# Patient Record
Sex: FEMALE | Race: WHITE | NOT HISPANIC OR LATINO | Employment: FULL TIME | URBAN - METROPOLITAN AREA
[De-identification: names, ages, dates, MRNs, and addresses within clinical notes are randomized per-mention and may not be internally consistent; named-entity substitution may affect disease eponyms.]

---

## 2017-01-08 DIAGNOSIS — F41.1 GAD (GENERALIZED ANXIETY DISORDER): ICD-10-CM

## 2017-01-08 DIAGNOSIS — F33.1 MODERATE RECURRENT MAJOR DEPRESSION (H): Primary | ICD-10-CM

## 2017-01-10 NOTE — TELEPHONE ENCOUNTER
desvenlafaxine succinate ER (PRISTIQ) 50 MG 24 hr tablet    Last Written Prescription Date: 12/9/16  Last Fill Quantity: 30, # refills: 0  Last Office Visit with [facility], [facility] or [facility] prescribing provider: 10/13/16        BP Readings from Last 3 Encounters:   10/14/16 114/70   07/11/16 110/70   05/18/16 124/82     Pulse: (for Fetzima)  CREATININE   Date Value Ref Range Status   01/11/2016 0.75 0.52 - 1.04 mg/dL Final   ]    Last PHQ-9 score on record=   PHQ-9 SCORE 10/14/2016   Total Score -   Total Score 19

## 2017-01-12 RX ORDER — DESVENLAFAXINE SUCCINATE 50 MG
TABLET, EXTENDED RELEASE 24 HR ORAL
Qty: 90 TABLET | Refills: 1 | Status: SHIPPED | OUTPATIENT
Start: 2017-01-12 | End: 2017-07-18

## 2017-01-12 NOTE — TELEPHONE ENCOUNTER
PHQ-9 score:    PHQ-9 SCORE 1/12/2017   Total Score -   Total Score 2       Med previously listed as reported.  Dr. Vela filled while Dr. Rosales was out of the office.  Dr. Vela has never seen patient.   Please advise if ok to refill under DR. Rosales's name  Paradise Arceo RN

## 2017-01-12 NOTE — TELEPHONE ENCOUNTER
Called patient and completed PHQ-9 over the phone.   Routing to RN Refills Pools.    Maty Steele MA

## 2017-01-13 ASSESSMENT — PATIENT HEALTH QUESTIONNAIRE - PHQ9: SUM OF ALL RESPONSES TO PHQ QUESTIONS 1-9: 2

## 2017-01-13 NOTE — TELEPHONE ENCOUNTER
Left message advising patient rx was sent to her pharmacy. Gave x58 for questions.  Britney Disla, CMA

## 2017-01-13 NOTE — TELEPHONE ENCOUNTER
MA- please all patient and let her know her refills have been sent to the pharmacy.     Emmy Cheek RN   [facility]- Float Nurse/[place]

## 2017-04-13 ENCOUNTER — TELEPHONE (OUTPATIENT)
Dept: FAMILY MEDICINE | Facility: CLINIC | Age: 47
End: 2017-04-13

## 2017-04-13 NOTE — LETTER
[address] [address]                  Crystal Marcus,  [address] [place] [address]              Monitoring and managing your preventative and chronic health conditions are very important to us. Our records indicate that you are due for your Patient Health Questionnaire. Enclosed is a pre-stamped envelope to send your questionnaire back to us.      If you have received your health care elsewhere, please call the clinic so the information can be documented in your chart.    Please call [phone] or message us through your [employer] account to schedule an appointment or provide information for your chart.     Feel free to contact us if you have any questions or concerns!    I look forward to seeing you and working with you on your health care needs.     Sincerely,         Maryse Rosales MD/blt            *If you have already scheduled an appointment, please disregard this reminder

## 2017-07-11 ENCOUNTER — TELEPHONE (OUTPATIENT)
Dept: FAMILY MEDICINE | Facility: CLINIC | Age: 47
End: 2017-07-11

## 2017-07-11 ENCOUNTER — OFFICE VISIT (OUTPATIENT)
Dept: FAMILY MEDICINE | Facility: CLINIC | Age: 47
End: 2017-07-11
Payer: COMMERCIAL

## 2017-07-11 VITALS
DIASTOLIC BLOOD PRESSURE: 76 MMHG | WEIGHT: 168 LBS | HEIGHT: 67 IN | HEART RATE: 88 BPM | SYSTOLIC BLOOD PRESSURE: 121 MMHG | OXYGEN SATURATION: 94 % | TEMPERATURE: 99.1 F | BODY MASS INDEX: 26.37 KG/M2

## 2017-07-11 DIAGNOSIS — Z12.31 VISIT FOR SCREENING MAMMOGRAM: Primary | ICD-10-CM

## 2017-07-11 DIAGNOSIS — J20.9 ACUTE BRONCHITIS, UNSPECIFIED ORGANISM: ICD-10-CM

## 2017-07-11 PROCEDURE — 99213 OFFICE O/P EST LOW 20 MIN: CPT | Performed by: PHYSICIAN ASSISTANT

## 2017-07-11 RX ORDER — AZITHROMYCIN 250 MG/1
TABLET, FILM COATED ORAL
Qty: 6 TABLET | Refills: 0 | Status: SHIPPED | OUTPATIENT
Start: 2017-07-11 | End: 2017-11-06

## 2017-07-11 RX ORDER — CODEINE PHOSPHATE AND GUAIFENESIN 10; 100 MG/5ML; MG/5ML
1 SOLUTION ORAL
Qty: 120 ML | Refills: 0 | Status: SHIPPED | OUTPATIENT
Start: 2017-07-11 | End: 2017-08-31

## 2017-07-11 ASSESSMENT — ANXIETY QUESTIONNAIRES
1. FEELING NERVOUS, ANXIOUS, OR ON EDGE: NOT AT ALL
GAD7 TOTAL SCORE: 0
3. WORRYING TOO MUCH ABOUT DIFFERENT THINGS: NOT AT ALL
7. FEELING AFRAID AS IF SOMETHING AWFUL MIGHT HAPPEN: NOT AT ALL
IF YOU CHECKED OFF ANY PROBLEMS ON THIS QUESTIONNAIRE, HOW DIFFICULT HAVE THESE PROBLEMS MADE IT FOR YOU TO DO YOUR WORK, TAKE CARE OF THINGS AT HOME, OR GET ALONG WITH OTHER PEOPLE: NOT DIFFICULT AT ALL
2. NOT BEING ABLE TO STOP OR CONTROL WORRYING: NOT AT ALL
5. BEING SO RESTLESS THAT IT IS HARD TO SIT STILL: NOT AT ALL
6. BECOMING EASILY ANNOYED OR IRRITABLE: NOT AT ALL

## 2017-07-11 ASSESSMENT — PATIENT HEALTH QUESTIONNAIRE - PHQ9: 5. POOR APPETITE OR OVEREATING: NOT AT ALL

## 2017-07-11 NOTE — PROGRESS NOTES
SUBJECTIVE:                                                    Crystal Marcus is a 46 year old female who presents to clinic today for the following health issues:    ENT Symptoms             Symptoms: cc Present Absent Comment   Fever/Chills   x    Fatigue  x     Muscle Aches   x    Eye Irritation   x    Sneezing   x    Nasal Delbert/Drg   x    Sinus Pressure/Pain   x    Loss of smell   x    Dental pain   x    Sore Throat   x    Swollen Glands   x    Ear Pain/Fullness   x    Cough  x     Wheeze  x     Chest Pain   x    Shortness of breath   x    Rash   x    Other   x      Symptom duration:  x 2-3 weeks   Symptom severity:  moderate   Treatments tried:  OTC   Contacts:  none       Started off as fatigue and cough.  Now even more tired.   Feels it in her chest.  No extra body aches.  NO shortness of breath.     Recent antibiotic use:never   Eating and drinking fluids normally:   yes  Vomiting or diarrhea: no  History of needing to use an inhaler:  never  History of pneumonia or bronchitis: no pneumonia, gets bronchitis regularly  Smoker: former, quit smoking 16 years ago  Allergies:no  Rashes: no  Oxygen is: 94 percent    No sinus congestion just in chest.   Does keep her up at night.   Using mucinex otc.   Needs note for work.       Per pt is due for a MAMMO, will call back to get it set up.          Problem list and histories reviewed & adjusted, as indicated.  Additional history: as documented    Patient Active Problem List   Diagnosis     FH: breast cancer in first degree relative     CARDIOVASCULAR SCREENING; LDL GOAL LESS THAN 160     Moderate recurrent major depression (H)     Hot flashes     Vitamin D deficiency     Constipation     Ureterocele, acquired     Past Surgical History:   Procedure Laterality Date     BIOPSY  1995, 2015     DAVINCI HYSTERECTOMY TOTAL, SALPINGECTOMY BILATERAL Bilateral 12/11/2015    Procedure: DAVINCI HYSTERECTOMY TOTAL, SALPINGECTOMY BILATERAL;  Surgeon: Dary Bloom MD;   "Location: [facility] OR     HYSTERECTOMY, PAP NO LONGER INDICATED       SURGICAL HISTORY OF -   1990    Cryotherapy     wisdom teeth removal         Social History   Substance Use Topics     Smoking status: Former Smoker     Packs/day: 0.20     Years: 12.00     Types: Cigarettes     Start date: 1/1/1988     Quit date: 9/1/2000     Smokeless tobacco: Never Used     Alcohol use Yes      Comment: 5 drinks a week     Family History   Problem Relation Age of Onset     Breast Cancer Mother      Lipids Mother      Hypertension Mother      Breast Cancer Maternal Grandmother      Hypertension Father      Seizure Disorder Brother          Current Outpatient Prescriptions   Medication Sig Dispense Refill     PRISTIQ 50 MG 24 hr tablet TAKE 1 TABLET (50 MG) BY MOUTH DAILY 90 tablet 1     multivitamin, therapeutic with minerals (THERA-VIT-M) TABS Take 1 tablet by mouth daily       ibuprofen (ADVIL,MOTRIN) 600 MG tablet Take 1 tablet (600 mg) by mouth every 6 hours as needed for pain (mild) 60 tablet 0     Ferrous Sulfate (IRON SUPPLEMENT PO) Take 325 mg by mouth daily        simethicone (MYLICON) 80 MG chewable tablet Take 1 tablet by mouth every 6 hours as needed for flatulence. 30 tablet 0     Allergies   Allergen Reactions     Sulfa Drugs Rash     SULFA       ROS:  Constitutional, HEENT, cardiovascular, pulmonary, gi and gu systems are negative, except as otherwise noted.    OBJECTIVE:     /76  Pulse 88  Temp 99.1  F (37.3  C) (Tympanic)  Ht 5' 7\" (1.702 m)  Wt 168 lb (76.2 kg)  LMP 12/07/2015  SpO2 94%  Breastfeeding? No  BMI 26.31 kg/m2  Body mass index is 26.31 kg/(m^2).  GENERAL:  No acute distress.  Interacts appropriately.  Breathing without difficulty.  Alert.  HEENT:  Tympanic membranes intact without effusion or erythema.  Oral mucosa moist. Posterior pharynx has no erythema.  Posterior pharynx has no exudate. Without edema.  NECK:  Soft and supple.  without tenderness.  Without lymphadenopathy.  Normal range " of motion.    CARDIAC:   Regular rate and rhythm.  No murmurs, rubs, or gallops.   PULMONARY: mild expiratory rhonchi throughout, no wheezing or crackles.   Normal air exchange/breath sounds.  No use of accessory muscles.    PSYCH: Normal affect.  SKIN: No rashes.        ASSESSMENT/PLAN:     ASSESSMENT / PLAN:  (Z12.31) Visit for screening mammogram  (primary encounter diagnosis)  Comment:   Plan: *MA Screening Digital Bilateral            (J20.9) Acute bronchitis, unspecified organism  Comment: viral versus bacterial discussed, not better after 2 weeks patient would like to try antibiotic and cough syrup at night to help her get rest  Needs note for work today also    Plan: azithromycin (ZITHROMAX) 250 MG tablet,         guaiFENesin-codeine (ROBITUSSIN AC) 100-10         MG/5ML SOLN solution          Do not drive with or mix cough syrup with alcohol. This can make you more tired/sedated.   Consider CXR if not improving or worsening symptoms occur    Take with food. Side effects discussed.  Call with worsening symptoms or if no improvement in 5 days.  Analgesics for pain with food as needed.        Patient Instructions   Please schedule mammogram within the next month as you are due        Crystal Abarca PA-C  [facility]

## 2017-07-11 NOTE — NURSING NOTE
"Chief Complaint   Patient presents with     Cough     coughing per pt x 2-3 weeks and not improving        Initial /76  Pulse 88  Temp 99.1  F (37.3  C) (Tympanic)  Ht 5' 7\" (1.702 m)  Wt 168 lb (76.2 kg)  LMP 12/07/2015  SpO2 94%  Breastfeeding? No  BMI 26.31 kg/m2 Estimated body mass index is 26.31 kg/(m^2) as calculated from the following:    Height as of this encounter: 5' 7\" (1.702 m).    Weight as of this encounter: 168 lb (76.2 kg).  Medication Reconciliation: complete      Ivette Steele MA    "

## 2017-07-11 NOTE — TELEPHONE ENCOUNTER
Patient has questions for Crystal on whether or not she should go back to work right away or should she be off for a certain amount of time while on medication, is she contagious? 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 Please call patient back as soon as possible to let her know. Thank you

## 2017-07-11 NOTE — TELEPHONE ENCOUNTER
Pt returned call and left vm to leave message on her vm.  Provider Message left as written.  Brit Downey RN

## 2017-07-11 NOTE — TELEPHONE ENCOUNTER
What does she mean by vulnerable? If just regular kids (not immunocompromised) she should be fine. Just cover cough and wash hands, usually after 2 weeks a patient is a lot less contagious than in the beginning.   She is fine to work as long as she does not have fever.     Crystal Abarca PA-C

## 2017-07-11 NOTE — LETTER
[facility]  [address] [facility] [address]  Phone: [phone]    July 11, 2017        Crystal RAMIREZ Angeline  [address] [facility] [address]          To whom it may concern:    RE: Crystal Bajwaevelin    Patient was seen and treated today at our clinic and missed work.    Please contact me for questions or concerns.      Sincerely,        Crystal Abarca PA-C

## 2017-07-11 NOTE — MR AVS SNAPSHOT
"              After Visit Summary   7/11/2017    Crystal Marcus    MRN: [id number]           Patient Information     Date Of Birth          1970        Visit Information        Provider Department      7/11/2017 9:20 AM Crystal Abarca PA-C [facility]        Today's Diagnoses     Visit for screening mammogram    -  1    Acute bronchitis, unspecified organism          Care Instructions    Please schedule mammogram within the next month as you are due          Follow-ups after your visit        Future tests that were ordered for you today     Open Future Orders        Priority Expected Expires Ordered    *MA Screening Digital Bilateral Routine  7/11/2018 7/11/2017            Who to contact     If you have questions or need follow up information about today's clinic visit or your schedule please contact [facility] directly at [phone].  Normal or non-critical lab and imaging results will be communicated to you by MyChart, letter or phone within 4 business days after the clinic has received the results. If you do not hear from us within 7 days, please contact the clinic through MyChart or phone. If you have a critical or abnormal lab result, we will notify you by phone as soon as possible.  Submit refill requests through [employer] or call your pharmacy and they will forward the refill request to us. Please allow 3 business days for your refill to be completed.          Additional Information About Your Visit        MyChart Information     [employer] lets you send messages to your doctor, view your test results, renew your prescriptions, schedule appointments and more. To sign up, go to [email or web address] . Click on \"Log in\" on the left side of the screen, which will take you to the Welcome page. Then click on \"Sign up Now\" on the right side of the page.     You will be asked to enter the access code listed below, as well as some personal information. Please follow the " "directions to create your username and password.     Your access code is: [email or web address]  Expires: 10/9/2017  9:52 AM     Your access code will  in 90 days. If you need help or a new code, please call your [facility] clinic or [phone].        Care EveryWhere ID     This is your Care EveryWhere ID. This could be used by other organizations to access your [facility] medical records  [id number]        Your Vitals Were     Pulse Temperature Height Last Period Pulse Oximetry Breastfeeding?    88 99.1  F (37.3  C) (Tympanic) 5' 7\" (1.702 m) 2015 94% No    BMI (Body Mass Index)                   26.31 kg/m2            Blood Pressure from Last 3 Encounters:   17 121/76   10/14/16 114/70   16 110/70    Weight from Last 3 Encounters:   17 168 lb (76.2 kg)   10/14/16 163 lb 8 oz (74.2 kg)   16 169 lb 6.4 oz (76.8 kg)              We Performed the Following     DEPRESSION ACTION PLAN (DAP)          Today's Medication Changes          These changes are accurate as of: 17  9:52 AM.  If you have any questions, ask your nurse or doctor.               Start taking these medicines.        Dose/Directions    azithromycin 250 MG tablet   Commonly known as:  ZITHROMAX   Used for:  Acute bronchitis, unspecified organism   Started by:  Crystal Abarca PA-C        Two tablets first day, then one tablet daily for four days.   Quantity:  6 tablet   Refills:  0       guaiFENesin-codeine 100-10 MG/5ML Soln solution   Commonly known as:  ROBITUSSIN AC   Used for:  Acute bronchitis, unspecified organism   Started by:  Crystal Abarca PA-C        Dose:  1 tsp.   Take 5 mLs by mouth nightly as needed for cough   Quantity:  120 mL   Refills:  0            Where to get your medicines      These medications were sent to [id number] - [place] - [address]  [facility] [address], [place] [address]     Phone:  [phone]     azithromycin 250 " MG tablet         Some of these will need a paper prescription and others can be bought over the counter.  Ask your nurse if you have questions.     Bring a paper prescription for each of these medications     guaiFENesin-codeine 100-10 MG/5ML Soln solution                Primary Care Provider Office Phone # Fax #    Maryse Rosales [phone]       [address] [address]        Equal Access to Services     MADELINE KENT : Hadii aad ku hadasho Soomaali, waaxda luqadaha, qaybta kaalmada adeegyada, waxay idiin hayaan adeeg kharash la'aan ah. [facility] [phone].    ATENCIÓN: Si habla español, tiene a haiens disposición servicios gratuitos de asistencia lingüística. [facility] [phone].    We comply with applicable federal civil rights laws and [place] laws. We do not discriminate on the basis of race, color, national origin, age, disability sex, sexual orientation or gender identity.            Thank you!     Thank you for choosing [facility]  [facility] your [facility]. Our goal is always to provide you with excellent care. Hearing back from our patients is one way we can continue to improve our services. Please take a few minutes to complete the written survey that you may receive in the mail after your visit with us. Thank you!             Your Updated Medication List - Protect others around you: Learn how to safely use, store and throw away your medicines at www.disposemymeds.org.          This list is accurate as of: 7/11/17  9:52 AM.  Always use your most recent med list.                   Brand Name Dispense Instructions for use Diagnosis    azithromycin 250 MG tablet    ZITHROMAX    6 tablet    Two tablets first day, then one tablet daily for four days.    Acute bronchitis, unspecified organism       guaiFENesin-codeine 100-10 MG/5ML Soln solution    ROBITUSSIN AC    120 mL    Take 5 mLs by mouth nightly as needed for cough    Acute bronchitis,  unspecified organism       ibuprofen 600 MG tablet    ADVIL/MOTRIN    60 tablet    Take 1 tablet (600 mg) by mouth every 6 hours as needed for pain (mild)    S/P hysterectomy       IRON SUPPLEMENT PO      Take 325 mg by mouth daily        multivitamin, therapeutic with minerals Tabs tablet      Take 1 tablet by mouth daily        PRISTIQ 50 MG 24 hr tablet   Generic drug:  desvenlafaxine succinate     90 tablet    TAKE 1 TABLET (50 MG) BY MOUTH DAILY    Moderate recurrent major depression (H), LIDIA (generalized anxiety disorder)       simethicone 80 MG chewable tablet    MYLICON    30 tablet    Take 1 tablet by mouth every 6 hours as needed for flatulence.    Abdominal bloating

## 2017-07-12 ASSESSMENT — PATIENT HEALTH QUESTIONNAIRE - PHQ9: SUM OF ALL RESPONSES TO PHQ QUESTIONS 1-9: 4

## 2017-07-12 ASSESSMENT — ANXIETY QUESTIONNAIRES: GAD7 TOTAL SCORE: 0

## 2017-07-18 DIAGNOSIS — F41.1 GAD (GENERALIZED ANXIETY DISORDER): ICD-10-CM

## 2017-07-18 DIAGNOSIS — F33.1 MODERATE RECURRENT MAJOR DEPRESSION (H): ICD-10-CM

## 2017-07-18 NOTE — TELEPHONE ENCOUNTER
PRISTIQ 50 MG 24 hr tablet  Last Written Prescription Date: 1/12/17  Last Fill Quantity: 90; # refills: 1  Last Office Visit with [facility], [facility] or [facility] prescribing provider:  10/13/16   Next 5 appointments (look out 90 days)     Aug 31, 2017 11:15 AM CDT   SHORT with Maryse Rosales MD   [facility]    [address] [facility] [address]   [phone]                   Last PHQ-9 score on record=   PHQ-9 SCORE 7/11/2017   Total Score 4       No results found for: AST  No results found for: ALT

## 2017-07-19 ENCOUNTER — TELEPHONE (OUTPATIENT)
Dept: FAMILY MEDICINE | Facility: CLINIC | Age: 47
End: 2017-07-19

## 2017-07-19 NOTE — TELEPHONE ENCOUNTER
Encounter still ongoing from yesterday medication hasn't been approved yet by provider. Barbara Torres MA

## 2017-07-20 RX ORDER — DESVENLAFAXINE 50 MG/1
TABLET, FILM COATED, EXTENDED RELEASE ORAL
Qty: 90 TABLET | Refills: 1 | Status: SHIPPED | OUTPATIENT
Start: 2017-07-20 | End: 2017-08-31

## 2017-07-20 NOTE — TELEPHONE ENCOUNTER
Routing refill request to provider for review/approval because:  Labs not current:  Migue Chi RN - [place]

## 2017-07-22 ENCOUNTER — HEALTH MAINTENANCE LETTER (OUTPATIENT)
Age: 47
End: 2017-07-22

## 2017-08-21 NOTE — PROGRESS NOTES
SUBJECTIVE:   Crystal Marcus is a 46 year old female who presents to clinic today for the following health issues:    Depression Followup    Status since last visit: Stable     See PHQ-9 for current symptoms.  Other associated symptoms: None    Complicating factors:   Significant life event:  No   Current substance abuse:  None  Anxiety or Panic symptoms:  No    PHQ-9  English  PHQ-9   Any Language    PHQ-9 SCORE 1/12/2017 7/11/2017 8/31/2017   Total Score - - -   Total Score 2 4 6     LIDIA-7 SCORE 10/14/2016 7/11/2017 8/31/2017   Total Score - - -   Total Score 10 0 0         Amount of exercise or physical activity: 6-7 days/week for an average of 15-30 minutes    Problems taking medications regularly: No    Medication side effects: Body adjustment from medication; When getting the last prescription pill looked different    Diet: regular (no restrictions)    Medication Followup of Pritiq    Taking Medication as prescribed: yes    Side Effects:  Body adjustment (felt a tiny bit of withdrawal felt like medication dosage change)    Medication Helping Symptoms:  yes       Patient has a hard time falling asleep for years now, tossing and turning. It usually takes her an hour to fall asleep. She follows a normal routine. If she cannot fall asleep she will watch TV. Her dogs also sleep in their room. She has tried taking melatonin an hour before bed without effect. She is averaging 8-9 hours of sleep a night. She does not nap often, but when she does, long naps interrupt her sleep. She tries to drink caffeine in the morning and avoids drinking it in the afternoon unless needed. She does not drink alcoholic beverages often, if drinking a couple beers she has no trouble falling asleep but will wake up 4 hours into her sleep.     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 She continues to walk the dogs 1-2 miles a day.       Problem list and histories reviewed &  adjusted, as indicated.  Additional history: as documented  Patient Active Problem List   Diagnosis     FH: breast cancer in first degree relative     CARDIOVASCULAR SCREENING; LDL GOAL LESS THAN 160     Moderate recurrent major depression (H)     Hot flashes     Vitamin D deficiency     Constipation     Ureterocele, acquired     Past Surgical History:   Procedure Laterality Date     BIOPSY  1995, 2015     DAVINCI HYSTERECTOMY TOTAL, SALPINGECTOMY BILATERAL Bilateral 12/11/2015    Procedure: DAVINCI HYSTERECTOMY TOTAL, SALPINGECTOMY BILATERAL;  Surgeon: Dary Bloom MD;  Location: [facility]     HYSTERECTOMY, PAP NO LONGER INDICATED       SURGICAL HISTORY OF -   1990    Cryotherapy     wisdom teeth removal         Social History   Substance Use Topics     Smoking status: Former Smoker     Packs/day: 0.20     Years: 12.00     Types: Cigarettes     Start date: 1/1/1988     Quit date: 9/1/2000     Smokeless tobacco: Never Used     Alcohol use Yes      Comment: 5 drinks a week     Family History   Problem Relation Age of Onset     Breast Cancer Mother      Lipids Mother      Hypertension Mother      Breast Cancer Maternal Grandmother      Hypertension Father      Seizure Disorder Brother          Current Outpatient Prescriptions   Medication Sig Dispense Refill     desvenlafaxine succinate (PRISTIQ) 50 MG 24 hr tablet TAKE 1 TABLET (50 MG) BY MOUTH DAILY 90 tablet 1     azithromycin (ZITHROMAX) 250 MG tablet Two tablets first day, then one tablet daily for four days. 6 tablet 0     multivitamin, therapeutic with minerals (THERA-VIT-M) TABS Take 1 tablet by mouth daily       ibuprofen (ADVIL,MOTRIN) 600 MG tablet Take 1 tablet (600 mg) by mouth every 6 hours as needed for pain (mild) 60 tablet 0     Ferrous Sulfate (IRON SUPPLEMENT PO) Take 325 mg by mouth daily        simethicone (MYLICON) 80 MG chewable tablet Take 1 tablet by mouth every 6 hours as needed for flatulence. 30 tablet 0     Allergies   Allergen Reactions  "    Sulfa Drugs Rash     SULFA     Recent Labs   Lab Test  05/18/16   1143  01/11/16   1552  01/29/15   1118  01/10/13   1150 05/27/11   LDL  158*   --    --    --   129   HDL  55   --    --    --   65   TRIG   --    --    --    --   91   CR   --   0.75  0.76   --    --    GFRESTIMATED   --   84  83   --    --    GFRESTBLACK   --   >90   GFR Calc    >90   GFR Calc     --    --    POTASSIUM   --   4.0  4.3   --    --    TSH  1.17   --    --   1.02   --       BP Readings from Last 3 Encounters:   08/31/17 106/82   07/11/17 121/76   10/14/16 114/70    Wt Readings from Last 3 Encounters:   08/31/17 174 lb 8 oz (79.2 kg)   07/11/17 168 lb (76.2 kg)   10/14/16 163 lb 8 oz (74.2 kg)         Reviewed and updated as needed this visit by clinical staff  Reviewed and updated as needed this visit by Provider    ROS:  Constitutional, HEENT, cardiovascular, pulmonary, gi and gu systems are negative, except as otherwise noted.    This document serves as a record of the services and decisions personally performed and made by Maryse Rosales MD. It was created on his/her behalf by Leanna Bassett, trained medical scribe. The creation of this document is based the provider's statements to the medical scribes.    Scribxochitl Bassett 11:50 AM, August 31, 2017  OBJECTIVE:   /82  Pulse 80  Temp 97.7  F (36.5  C) (Oral)  Wt 174 lb 8 oz (79.2 kg)  LMP 12/07/2015  SpO2 98%  BMI 27.33 kg/m2  Body mass index is 27.33 kg/(m^2).  GENERAL: healthy, alert and no distress  PSYCH: mentation appears normal, affect normal/bright    Diagnostic Test Results:  none   ASSESSMENT/PLAN:   Depression; recurrent episode-- Full remission   Associated with the following complications:    None   Plan:  No changes in the patient's current treatment plan    BMI:   Estimated body mass index is 27.33 kg/(m^2) as calculated from the following:    Height as of 7/11/17: 5' 7\" (1.702 m).    Weight as of this encounter: " "174 lb 8 oz (79.2 kg).   Weight management plan: Discussed healthy diet and exercise guidelines and patient will follow up in 6 months in clinic to re-evaluate.      ICD-10-CM    1. Moderate recurrent major depression (H) F33.1 desvenlafaxine succinate (PRISTIQ) 50 MG 24 hr tablet   2. LIDIA (generalized anxiety disorder) F41.1 desvenlafaxine succinate (PRISTIQ) 50 MG 24 hr tablet   3. Overweight E66.3    4. Need for prophylactic vaccination and inoculation against influenza Z23 Vaccine Administration, Initial [91670]     FLU VAC, SPLIT VIRUS IM > 3 YO (QUADRIVALENT) [65690]   5. Visit for screening mammogram Z12.31 MA SCREENING DIGITAL BILAT - Future  (s+30)       MDD with LIDIA: doing better, stable. Continue current medication  Insomnia: reviewed sleep hygiene in detail. If not improving with melatonin/sleep hygiene, then consider cognitive behavioral therapy for sleep-- can mychart me for a referral  Discussed habit change, weight loss. Given resources  Flu vaccine today.   Scheduled mammogram today. Yearly due to family history of breast cancer.   Follow up in 6 months      PATIENT INSTRUCTIONS  To help you sleep better try:   - Removing screens from your bedroom   - Take your melatonin earlier in the evening, it lasts 8-10 hours.   - Consider removing your pets from your bedroom    - If not falling asleep after 20 minutes, get up and read a boring book in dim lighting     If none of these things are working consider cognitive behavioral therapy for sleep in [place].    Look into Seema Baez's 4 Tendencies Framework. She has a podcast- Happier. She also has a book called Better and a new one coming out about the four tendencies. Look into her phone agnieszka \"Better\".      The information in this document, created by the medical scribe for me, accurately reflects the services I personally performed and the decisions made by me. I have reviewed and approved this document for accuracy prior to leaving the patient " care area.  Maryse Rosales MD  11:50 AM, 08/31/17    Maryse Rosales MD  [facility]    Start 11:47 AM  End 12:05 PM

## 2017-08-31 ENCOUNTER — OFFICE VISIT (OUTPATIENT)
Dept: FAMILY MEDICINE | Facility: CLINIC | Age: 47
End: 2017-08-31
Payer: COMMERCIAL

## 2017-08-31 VITALS
HEART RATE: 80 BPM | TEMPERATURE: 97.7 F | DIASTOLIC BLOOD PRESSURE: 82 MMHG | WEIGHT: 174.5 LBS | SYSTOLIC BLOOD PRESSURE: 106 MMHG | OXYGEN SATURATION: 98 % | BODY MASS INDEX: 27.33 KG/M2

## 2017-08-31 DIAGNOSIS — F33.1 MODERATE RECURRENT MAJOR DEPRESSION (H): Primary | ICD-10-CM

## 2017-08-31 DIAGNOSIS — E66.3 OVERWEIGHT: ICD-10-CM

## 2017-08-31 DIAGNOSIS — F41.1 GAD (GENERALIZED ANXIETY DISORDER): ICD-10-CM

## 2017-08-31 DIAGNOSIS — Z23 NEED FOR PROPHYLACTIC VACCINATION AND INOCULATION AGAINST INFLUENZA: ICD-10-CM

## 2017-08-31 DIAGNOSIS — Z12.31 VISIT FOR SCREENING MAMMOGRAM: ICD-10-CM

## 2017-08-31 PROCEDURE — 90471 IMMUNIZATION ADMIN: CPT | Performed by: FAMILY MEDICINE

## 2017-08-31 PROCEDURE — 99214 OFFICE O/P EST MOD 30 MIN: CPT | Mod: 25 | Performed by: FAMILY MEDICINE

## 2017-08-31 PROCEDURE — 90686 IIV4 VACC NO PRSV 0.5 ML IM: CPT | Performed by: FAMILY MEDICINE

## 2017-08-31 RX ORDER — DESVENLAFAXINE 50 MG/1
TABLET, FILM COATED, EXTENDED RELEASE ORAL
Qty: 90 TABLET | Refills: 1 | Status: SHIPPED | OUTPATIENT
Start: 2017-08-31 | End: 2018-07-02

## 2017-08-31 ASSESSMENT — ANXIETY QUESTIONNAIRES
1. FEELING NERVOUS, ANXIOUS, OR ON EDGE: NOT AT ALL
GAD7 TOTAL SCORE: 0
7. FEELING AFRAID AS IF SOMETHING AWFUL MIGHT HAPPEN: NOT AT ALL
IF YOU CHECKED OFF ANY PROBLEMS ON THIS QUESTIONNAIRE, HOW DIFFICULT HAVE THESE PROBLEMS MADE IT FOR YOU TO DO YOUR WORK, TAKE CARE OF THINGS AT HOME, OR GET ALONG WITH OTHER PEOPLE: NOT DIFFICULT AT ALL
5. BEING SO RESTLESS THAT IT IS HARD TO SIT STILL: NOT AT ALL
6. BECOMING EASILY ANNOYED OR IRRITABLE: NOT AT ALL
2. NOT BEING ABLE TO STOP OR CONTROL WORRYING: NOT AT ALL
3. WORRYING TOO MUCH ABOUT DIFFERENT THINGS: NOT AT ALL

## 2017-08-31 ASSESSMENT — PATIENT HEALTH QUESTIONNAIRE - PHQ9
5. POOR APPETITE OR OVEREATING: NOT AT ALL
SUM OF ALL RESPONSES TO PHQ QUESTIONS 1-9: 6

## 2017-08-31 NOTE — PROGRESS NOTES
Injectable Influenza Immunization Documentation    1.  Is the person to be vaccinated sick today?  No    2. Does the person to be vaccinated have an allergy to eggs or to a component of the vaccine?  No    3. Has the person to be vaccinated today ever had a serious reaction to influenza vaccine in the past?  No    4. Has the person to be vaccinated ever had Guillain-Americus syndrome?  No     Form completed by Maty Steele MA

## 2017-08-31 NOTE — PATIENT INSTRUCTIONS
"To help you sleep better try:   - Removing screens from your bedroom   - Take your melatonin earlier in the evening, it lasts 8-10 hours.   - Consider removing your pets from your bedroom    - If not falling asleep after 20 minutes, get up and read a boring book in dim lighting     If none of these things are working consider cognitive behavioral therapy for sleep in [place].    Look into Seema Baez's 4 Tendencies Framework. She has a podcast- Happier. She also has a book called Better and a new one coming out about the four tendencies. Look into her phone agnieszka \"Better\".     Follow up with me regarding your sleep.    [facility] ([facility]3]    If you have any questions regarding to your visit please contact your care team:       Team Purple:   Clinic Hours Telephone Number   Dr. Caitlin Rosales     7am-7pm  Monday - Thursday   7am-5pm  Fridays  [phone]  (Appointment scheduling available 24/7)    Questions about your Visit?   Team Line:  [phone]   Urgent Care - [place] and [place] - 11am-9pm Monday-Friday Saturday-Sunday- 9am-5pm   [place] - 5pm-9pm Monday-Friday Saturday-Sunday- 9am-5pm  [phone] - [facility]  [phone] [facility]       What options do I have for visits at the clinic other than the traditional office visit?  To expand how we care for you, many of our providers are utilizing electronic visits (e-visits) and telephone visits, when medically appropriate, for interactions with their patients rather than a visit in the clinic.   We also offer nurse visits for many medical concerns. Just like any other service, we will bill your insurance company for this type of visit based on time spent on the phone with your provider. Not all insurance companies cover these visits. Please check with your medical insurance if this type of visit is covered. You will be responsible for any charges that are not paid by your insurance.  "     E-visits via [employer]:  generally incur a $35.00 fee.  Telephone visits:  Time spent on the phone: *charged based on time that is spent on the phone in increments of 10 minutes. Estimated cost:   5-10 mins $30.00   11-20 mins. $59.00   21-30 mins. $85.00     Use [employer] (secure email communication and access to your chart) to send your primary care provider a message or make an appointment. Ask someone on your Team how to sign up for [employer].  For a Price Quote for your services, please call our Consumer Price Line at [phone].  As always, Thank you for trusting us with your health care needs!    Discharged By: Maty

## 2017-08-31 NOTE — MR AVS SNAPSHOT
"              After Visit Summary   8/31/2017    Crystal Marcus    MRN: [id number]           Patient Information     Date Of Birth          1970        Visit Information        Provider Department      8/31/2017 11:15 AM Maryse Rosales MD [facility]        Today's Diagnoses     Moderate recurrent major depression (H)    -  1    Visit for screening mammogram        LIDIA (generalized anxiety disorder)          Care Instructions    To help you sleep better try:   - Removing screens from your bedroom   - Take your melatonin earlier in the evening, it lasts 8-10 hours.   - Consider removing your pets from your bedroom    - If not falling asleep after 20 minutes, get up and read a boring book in dim lighting     If none of these things are working consider cognitive behavioral therapy for sleep in [place].    Look into Seema Baez's 4 Tendencies Framework. She has a podcast- Happier. She also has a book called Better and a new one coming out about the four tendencies. Look into her phone agnieszka \"Better\".     Follow up with me regarding your sleep.    [facility]    If you have any questions regarding to your visit please contact your care team:       Team Purple:   Clinic Hours Telephone Number   Dr. Caitlin Rosales     7am-7pm  Monday - Thursday   7am-5pm  Fridays  [phone]  (Appointment scheduling available 24/7)    Questions about your Visit?   Team Line:  [phone]   Urgent Care - [place] and [place] - 11am-9pm Monday-Friday Saturday-Sunday- 9am-5pm   [place] - 5pm-9pm Monday-Friday Saturday-Sunday- 9am-5pm  [phone] - [facility]  [phone] [facility]       What options do I have for visits at the clinic other than the traditional office visit?  To expand how we care for you, many of our providers are utilizing electronic visits (e-visits) and telephone visits, when medically appropriate, for " interactions with their patients rather than a visit in the clinic.   We also offer nurse visits for many medical concerns. Just like any other service, we will bill your insurance company for this type of visit based on time spent on the phone with your provider. Not all insurance companies cover these visits. Please check with your medical insurance if this type of visit is covered. You will be responsible for any charges that are not paid by your insurance.      E-visits via [employer]:  generally incur a $35.00 fee.  Telephone visits:  Time spent on the phone: *charged based on time that is spent on the phone in increments of 10 minutes. Estimated cost:   5-10 mins $30.00   11-20 mins. $59.00   21-30 mins. $85.00     Use [employer] (secure email communication and access to your chart) to send your primary care provider a message or make an appointment. Ask someone on your Team how to sign up for [employer].  For a Price Quote for your services, please call our cVidya Line at [phone].  As always, Thank you for trusting us with your health care needs!    Discharged By: An            Follow-ups after your visit        Your next 10 appointments already scheduled     Nov 06, 2017  4:30 PM CST   PHYSICAL with Maryse Rosales MD   [facility] [place] ([facility] [place])    [address] [place] [address]  [place] [id number] [phone]              Future tests that were ordered for you today     Open Future Orders        Priority Expected Expires Ordered    MA SCREENING DIGITAL BILAT - Future  (s+30) Routine  8/21/2018 8/31/2017            Who to contact     If you have questions or need follow up information about today's clinic visit or your schedule please contact [facility] [place] directly at [phone].  Normal or non-critical lab and imaging results will be communicated to you by MyChart, letter or phone within 4 business days after the clinic has received the results. If you do not hear  "from us within 7 days, please contact the clinic through [employer] or phone. If you have a critical or abnormal lab result, we will notify you by phone as soon as possible.  Submit refill requests through [employer] or call your pharmacy and they will forward the refill request to us. Please allow 3 business days for your refill to be completed.          Additional Information About Your Visit        Prezacorhartribr Information     P[employer]stream lets you send messages to your doctor, view your test results, renew your prescriptions, schedule appointments and more. To sign up, go to [email or web address] . Click on \"Log in\" on the left side of the screen, which will take you to the Welcome page. Then click on \"Sign up Now\" on the right side of the page.     You will be asked to enter the access code listed below, as well as some personal information. Please follow the directions to create your username and password.     Your access code is: [email or web address]  Expires: 10/9/2017  9:52 AM     Your access code will  in 90 days. If you need help or a new code, please call your [facility] clinic or [phone].        Care EveryWhere ID     This is your Care EveryWhere ID. This could be used by other organizations to access your [facility] medical records  [id number]        Your Vitals Were     Pulse Temperature Last Period Pulse Oximetry BMI (Body Mass Index)       80 97.7  F (36.5  C) (Oral) 2015 98% 27.33 kg/m2        Blood Pressure from Last 3 Encounters:   17 106/82   17 121/76   10/14/16 114/70    Weight from Last 3 Encounters:   17 174 lb 8 oz (79.2 kg)   17 168 lb (76.2 kg)   10/14/16 163 lb 8 oz (74.2 kg)                 Today's Medication Changes          These changes are accurate as of: 17 12:09 PM.  If you have any questions, ask your nurse or doctor.               Stop taking these medicines if you haven't already. Please contact your care team if you have questions.     guaiFENesin-codeine " 100-10 MG/5ML Soln solution   Commonly known as:  ROBITUSSIN AC   Stopped by:  Maryse Rosales MD                Where to get your medicines      These medications were sent to [facility]/pharmacy #[id number] - [place], [place] - [address]  [address], [place] [address]     Phone:  [phone]     desvenlafaxine succinate 50 MG 24 hr tablet                Primary Care Provider Office Phone # Fax #    Maryse Rosales [phone] [address] [facility]  [place] [address]        Equal Access to Services     [facility]: Hadii aad ku hadasho Soomaali, waaxda luqadaha, qaybta kaalmada adeegyada, waxay idiin hayaan chelsi fernandes . So [facility] [phone].    ATENCIÓN: Si habla español, tiene a haines disposición servicios gratuitos de asistencia lingüística. [facility] [phone].    We comply with applicable federal civil rights laws and [place] laws. We do not discriminate on the basis of race, color, national origin, age, disability sex, sexual orientation or gender identity.            Thank you!     Thank you for choosing [facility]  for your care. Our goal is always to provide you with excellent care. Hearing back from our patients is one way we can continue to improve our services. Please take a few minutes to complete the written survey that you may receive in the mail after your visit with us. Thank you!             Your Updated Medication List - Protect others around you: Learn how to safely use, store and throw away your medicines at www.disposemymeds.org.          This list is accurate as of: 8/31/17 12:09 PM.  Always use your most recent med list.                   Brand Name Dispense Instructions for use Diagnosis    azithromycin 250 MG tablet    ZITHROMAX    6 tablet    Two tablets first day, then one tablet daily for four days.    Acute bronchitis, unspecified organism       desvenlafaxine succinate 50 MG 24 hr tablet     PRISTIQ    90 tablet    TAKE 1 TABLET (50 MG) BY MOUTH DAILY    Moderate recurrent major depression (H), LIDIA (generalized anxiety disorder)       ibuprofen 600 MG tablet    ADVIL/MOTRIN    60 tablet    Take 1 tablet (600 mg) by mouth every 6 hours as needed for pain (mild)    S/P hysterectomy       IRON SUPPLEMENT PO      Take 325 mg by mouth daily        multivitamin, therapeutic with minerals Tabs tablet      Take 1 tablet by mouth daily        simethicone 80 MG chewable tablet    MYLICON    30 tablet    Take 1 tablet by mouth every 6 hours as needed for flatulence.    Abdominal bloating

## 2017-08-31 NOTE — NURSING NOTE
"Chief Complaint   Patient presents with     RECHECK     Depression     Flu Shot       Initial /82  Pulse 80  Temp 97.7  F (36.5  C) (Oral)  Wt 174 lb 8 oz (79.2 kg)  LMP 12/07/2015  SpO2 98%  BMI 27.33 kg/m2 Estimated body mass index is 27.33 kg/(m^2) as calculated from the following:    Height as of 7/11/17: 5' 7\" (1.702 m).    Weight as of this encounter: 174 lb 8 oz (79.2 kg).  Medication Reconciliation: complete     Barbara Torres MA    "

## 2017-09-01 ASSESSMENT — ANXIETY QUESTIONNAIRES: GAD7 TOTAL SCORE: 0

## 2017-11-06 ENCOUNTER — RADIANT APPOINTMENT (OUTPATIENT)
Dept: MAMMOGRAPHY | Facility: CLINIC | Age: 47
End: 2017-11-06
Attending: FAMILY MEDICINE
Payer: COMMERCIAL

## 2017-11-06 ENCOUNTER — OFFICE VISIT (OUTPATIENT)
Dept: FAMILY MEDICINE | Facility: CLINIC | Age: 47
End: 2017-11-06
Payer: COMMERCIAL

## 2017-11-06 VITALS
OXYGEN SATURATION: 97 % | TEMPERATURE: 98 F | SYSTOLIC BLOOD PRESSURE: 112 MMHG | WEIGHT: 169.5 LBS | HEART RATE: 77 BPM | BODY MASS INDEX: 27.24 KG/M2 | DIASTOLIC BLOOD PRESSURE: 78 MMHG | HEIGHT: 66 IN

## 2017-11-06 DIAGNOSIS — Z00.00 ENCOUNTER FOR ROUTINE ADULT HEALTH EXAMINATION WITHOUT ABNORMAL FINDINGS: Primary | ICD-10-CM

## 2017-11-06 DIAGNOSIS — Z80.3 FH: BREAST CANCER IN FIRST DEGREE RELATIVE: ICD-10-CM

## 2017-11-06 DIAGNOSIS — Z12.31 VISIT FOR SCREENING MAMMOGRAM: ICD-10-CM

## 2017-11-06 DIAGNOSIS — E66.3 OVERWEIGHT: ICD-10-CM

## 2017-11-06 DIAGNOSIS — Z23 NEED FOR PROPHYLACTIC VACCINATION AND INOCULATION AGAINST INFLUENZA: ICD-10-CM

## 2017-11-06 DIAGNOSIS — F33.1 MODERATE RECURRENT MAJOR DEPRESSION (H): ICD-10-CM

## 2017-11-06 PROCEDURE — G0202 SCR MAMMO BI INCL CAD: HCPCS | Mod: TC

## 2017-11-06 PROCEDURE — 99396 PREV VISIT EST AGE 40-64: CPT | Performed by: FAMILY MEDICINE

## 2017-11-06 NOTE — NURSING NOTE
"Chief Complaint   Patient presents with     Physical     Health Maintenance     PHQ-9       Initial /78  Pulse 77  Temp 98  F (36.7  C) (Oral)  Ht 5' 6.18\" (1.681 m)  Wt 169 lb 8 oz (76.9 kg)  LMP 12/07/2015  SpO2 97%  BMI 27.21 kg/m2 Estimated body mass index is 27.21 kg/(m^2) as calculated from the following:    Height as of this encounter: 5' 6.18\" (1.681 m).    Weight as of this encounter: 169 lb 8 oz (76.9 kg).  Medication Reconciliation: complete    "

## 2017-11-06 NOTE — MR AVS SNAPSHOT
After Visit Summary   11/6/2017    Crystal Marcus    MRN: [id number]           Patient Information     Date Of Birth          1970        Visit Information        Provider Department      11/6/2017 4:30 PM Marsye Rosales MD [facility]        Today's Diagnoses     Encounter for routine adult health examination without abnormal findings    -  1    Moderate recurrent major depression (H)        Visit for screening mammogram        Need for prophylactic vaccination and inoculation against influenza        FH: breast cancer in first degree relative        Overweight          Care Instructions      Preventive Health Recommendations  Female Ages 40 to 49    Yearly exam:     See your health care provider every year in order to  1. Review health changes.   2. Discuss preventive care.    3. Review your medicines if your doctor prescribed any.      Get a Pap test every three years (unless you have an abnormal result and your provider advises testing more often).      If you get Pap tests with HPV test, you only need to test every 5 years, unless you have an abnormal result. You do not need a Pap test if your uterus was removed (hysterectomy) and you have not had cancer.      You should be tested each year for STDs (sexually transmitted diseases), if you're at risk.       Ask your doctor if you should have a mammogram.      Have a colonoscopy (test for colon cancer) if someone in your family has had colon cancer or polyps before age 50.       Have a cholesterol test every 5 years.       Have a diabetes test (fasting glucose) after age 45. If you are at risk for diabetes, you should have this test every 3 years.    Shots: Get a flu shot each year. Get a tetanus shot every 10 years.     Nutrition:     Eat at least 5 servings of fruits and vegetables each day.    Eat whole-grain bread, whole-wheat pasta and brown rice instead of white grains and rice.    Talk to your provider about Calcium and  "Vitamin D.     Lifestyle    Exercise at least 150 minutes a week (an average of 30 minutes a day, 5 days a week). This will help you control your weight and prevent disease.    Limit alcohol to one drink per day.    No smoking.     Wear sunscreen to prevent skin cancer.    See your dentist every six months for an exam and cleaning.          Follow-ups after your visit        Who to contact     If you have questions or need follow up information about today's clinic visit or your schedule please contact [facility] directly at [phone].  Normal or non-critical lab and imaging results will be communicated to you by Endomondohart, letter or phone within 4 business days after the clinic has received the results. If you do not hear from us within 7 days, please contact the clinic through Qwiqq or phone. If you have a critical or abnormal lab result, we will notify you by phone as soon as possible.  Submit refill requests through Qwiqq or call your pharmacy and they will forward the refill request to us. Please allow 3 business days for your refill to be completed.          Additional Information About Your Visit        Qwiqq Information     Qwiqq lets you send messages to your doctor, view your test results, renew your prescriptions, schedule appointments and more. To sign up, go to [email or web address] . Click on \"Log in\" on the left side of the screen, which will take you to the Welcome page. Then click on \"Sign up Now\" on the right side of the page.     You will be asked to enter the access code listed below, as well as some personal information. Please follow the directions to create your username and password.     Your access code is: [email or web address]  Expires: 2018  7:28 AM     Your access code will  in 90 days. If you need help or a new code, please call your [place] clinic or [phone].        Care EveryWhere ID     This is your Care EveryWhere ID. This could be used by other " "organizations to access your [place] medical records  [id number]        Your Vitals Were     Pulse Temperature Height Last Period Pulse Oximetry BMI (Body Mass Index)    77 98  F (36.7  C) (Oral) 5' 6.18\" (1.681 m) 12/07/2015 97% 27.21 kg/m2       Blood Pressure from Last 3 Encounters:   11/06/17 112/78   08/31/17 106/82   07/11/17 121/76    Weight from Last 3 Encounters:   11/06/17 169 lb 8 oz (76.9 kg)   08/31/17 174 lb 8 oz (79.2 kg)   07/11/17 168 lb (76.2 kg)              Today, you had the following     No orders found for display         Today's Medication Changes          These changes are accurate as of: 11/6/17 11:59 PM.  If you have any questions, ask your nurse or doctor.               Stop taking these medicines if you haven't already. Please contact your care team if you have questions.     IRON SUPPLEMENT PO   Stopped by:  Maryse Rosales MD           simethicone 80 MG chewable tablet   Commonly known as:  MYLICON   Stopped by:  Maryse Rosales MD                    Primary Care Provider Office Phone # Fax #    Maryse Rosales [phone] [address] [facility] [address]        Equal Access to Services     [facility]: Hadii chantal ku hadasho Soomaali, waaxda luqadaha, qaybta kaalmada adepriscilla, stephan jordan. So [facility] [phone].    ATENCIÓN: Si habla español, tiene a haines disposición servicios gratuitos de asistencia lingüística. Llame al [phone].    We comply with applicable federal civil rights laws and [place] laws. We do not discriminate on the basis of race, color, national origin, age, disability, sex, sexual orientation, or gender identity.            Thank you!     Thank you for choosing [facility]  for your care. Our goal is always to provide you with excellent care. Hearing back from our patients is one way we can continue to improve our services. Please take a few minutes to complete the written " survey that you may receive in the mail after your visit with us. Thank you!             Your Updated Medication List - Protect others around you: Learn how to safely use, store and throw away your medicines at www.disposemymeds.org.          This list is accurate as of: 11/6/17 11:59 PM.  Always use your most recent med list.                   Brand Name Dispense Instructions for use Diagnosis    desvenlafaxine succinate 50 MG 24 hr tablet    PRISTIQ    90 tablet    TAKE 1 TABLET (50 MG) BY MOUTH DAILY    Moderate recurrent major depression (H), LIDIA (generalized anxiety disorder)       ibuprofen 600 MG tablet    ADVIL/MOTRIN    60 tablet    Take 1 tablet (600 mg) by mouth every 6 hours as needed for pain (mild)    S/P hysterectomy       multivitamin, therapeutic with minerals Tabs tablet      Take 1 tablet by mouth daily

## 2017-11-06 NOTE — PROGRESS NOTES
SUBJECTIVE:   CC: Crystal Marcus is an 46 year old woman who presents for preventive health visit.     Physical   Annual:     Getting at least 3 servings of Calcium per day::  Yes    Bi-annual eye exam::  Yes    Dental care twice a year::  Yes    Sleep apnea or symptoms of sleep apnea::  Daytime drowsiness    Diet::  Regular (no restrictions)    Frequency of exercise::  6-7 days/week    Duration of exercise::  30-45 minutes    Taking medications regularly::  Yes    Medication side effects::  None    Additional concerns today::  YES                Patient is planning to go to [place] for 8 days. In spring.  Her sleep has improved, cut out carbs except for sweet and regular potatoes, eating healthier walks the dog, weight is coming down a little and insomnia is much improved. . Abnormal pap in the very distant pastpast. Had mammogram today.     Red spots on left foot, and is scaly.  Gone today, but happens over pressure points.         Today's PHQ-2 Score:   PHQ-2 ( 1999 Pfizer) 11/6/2017   Q1: Little interest or pleasure in doing things 0   Q2: Feeling down, depressed or hopeless 0   PHQ-2 Score 0   Q1: Little interest or pleasure in doing things Not at all   Q2: Feeling down, depressed or hopeless Not at all   PHQ-2 Score 0       Abuse: Current or Past(Physical, Sexual or Emotional)- No  Do you feel safe in your environment - Yes    Social History   Substance Use Topics     Smoking status: Former Smoker     Packs/day: 0.20     Years: 12.00     Types: Cigarettes     Start date: 1/1/1988     Quit date: 9/1/2000     Smokeless tobacco: Never Used     Alcohol use Yes      Comment: 5 drinks a week     The patient does not drink >3 drinks per day nor >7 drinks per week.    Reviewed orders with patient.  Reviewed health maintenance and updated orders accordingly - Yes  BP Readings from Last 3 Encounters:   11/06/17 112/78   08/31/17 106/82   07/11/17 121/76    Wt Readings from Last 3 Encounters:   11/06/17 169 lb 8 oz (76.9  kg)   08/31/17 174 lb 8 oz (79.2 kg)   07/11/17 168 lb (76.2 kg)                  Patient Active Problem List   Diagnosis     FH: breast cancer in first degree relative     CARDIOVASCULAR SCREENING; LDL GOAL LESS THAN 160     Moderate recurrent major depression (H)     Hot flashes     Vitamin D deficiency     Constipation     Ureterocele, acquired     Overweight     Past Surgical History:   Procedure Laterality Date     BIOPSY  1995, 2015     DAVINCI HYSTERECTOMY TOTAL, SALPINGECTOMY BILATERAL Bilateral 12/11/2015    Procedure: DAVINCI HYSTERECTOMY TOTAL, SALPINGECTOMY BILATERAL;  Surgeon: Dary Bloom MD;  Location: [facility] OR     HYSTERECTOMY, PAP NO LONGER INDICATED       SURGICAL HISTORY OF -   1990    Cryotherapy     wisdom teeth removal         Social History   Substance Use Topics     Smoking status: Former Smoker     Packs/day: 0.20     Years: 12.00     Types: Cigarettes     Start date: 1/1/1988     Quit date: 9/1/2000     Smokeless tobacco: Never Used     Alcohol use Yes      Comment: 5 drinks a week     Family History   Problem Relation Age of Onset     Breast Cancer Mother      Lipids Mother      Hypertension Mother      Breast Cancer Maternal Grandmother      Hypertension Father      Seizure Disorder Brother          Current Outpatient Prescriptions   Medication Sig Dispense Refill     desvenlafaxine succinate (PRISTIQ) 50 MG 24 hr tablet TAKE 1 TABLET (50 MG) BY MOUTH DAILY 90 tablet 1     multivitamin, therapeutic with minerals (THERA-VIT-M) TABS Take 1 tablet by mouth daily       ibuprofen (ADVIL,MOTRIN) 600 MG tablet Take 1 tablet (600 mg) by mouth every 6 hours as needed for pain (mild) 60 tablet 0     Recent Labs   Lab Test  05/18/16   1143  01/11/16   1552  01/29/15   1118  01/10/13   1150 05/27/11   LDL  158*   --    --    --   129   HDL  55   --    --    --   65   TRIG   --    --    --    --   91   CR   --   0.75  0.76   --    --    GFRESTIMATED   --   84  83   --    --    GFRESTBLACK   --    ">90   GFR Calc    >90   GFR Calc     --    --    POTASSIUM   --   4.0  4.3   --    --    TSH  1.17   --    --   1.02   --       Mammo discussed, not appropriate for or declined by this patient.  Patient under age 50, mutual decision reflected in health maintenance.  Pertinent mammograms are reviewed under the imaging tab.  History of abnormal Pap smear:   YES - YSABEL 2/3 on biopsy - PAP/HPV co-testing at 12, 24 months.  If two negative results repeat co-testing in 3 years, if negative then routine screening.  Last 3 Pap Results:   PAP (no units)   Date Value   08/30/2011 NIL       Reviewed and updated as needed this visit by clinical staffTobacco  Allergies  Meds         Reviewed and updated as needed this visit by Provider            Review of Systems       This document serves as a record of the services and decisions personally performed and made by Maryse Rosales MD. It was created on her behalf by Jose Vargas, a trained medical scribe. The creation of this document is based the provider's statements to the medical scribe.    Jose Vargas November 6, 2017 4:42 PM    OBJECTIVE:   /78  Pulse 77  Temp 98  F (36.7  C) (Oral)  Ht 5' 6.18\" (1.681 m)  Wt 169 lb 8 oz (76.9 kg)  LMP 12/07/2015  SpO2 97%  BMI 27.21 kg/m2  Physical Exam      ASSESSMENT/PLAN:       ICD-10-CM    1. Encounter for routine adult health examination without abnormal findings Z00.00    2. Moderate recurrent major depression (H) F33.1    3. Visit for screening mammogram Z12.31    4. Need for prophylactic vaccination and inoculation against influenza Z23    5. FH: breast cancer in first degree relative Z80.3    6. Overweight E66.3      MDD: stable. Continue current medication      COUNSELING:  Reviewed preventive health counseling, as reflected in patient instructions       Regular exercise       Healthy diet/nutrition       Osteoporosis Prevention/Bone Health       (Kirstie)menopause management         " "reports that she quit smoking about 17 years ago. Her smoking use included Cigarettes. She started smoking about 29 years ago. She has a 2.40 pack-year smoking history. She has never used smokeless tobacco.    Estimated body mass index is 27.21 kg/(m^2) as calculated from the following:    Height as of this encounter: 5' 6.18\" (1.681 m).    Weight as of this encounter: 169 lb 8 oz (76.9 kg).   Weight management plan: Discussed healthy diet and exercise guidelines and patient will follow up in 12 months in clinic to re-evaluate.    Counseling Resources:  ATP IV Guidelines  Pooled Cohorts Equation Calculator  Breast Cancer Risk Calculator  FRAX Risk Assessment  ICSI Preventive Guidelines  Dietary Guidelines for Americans, 2010  USDA's MyPlate  ASA Prophylaxis  Lung CA Screening      The information in this document, created by the medical scribe for me, accurately reflects the services I personally performed and the decisions made by me. I have reviewed and approved this document for accuracy prior to leaving the patient care area.    Maryse Rosales MD  [facility]    "

## 2017-11-08 PROBLEM — E66.3 OVERWEIGHT: Status: ACTIVE | Noted: 2017-11-08

## 2017-11-22 ENCOUNTER — RADIANT APPOINTMENT (OUTPATIENT)
Dept: ULTRASOUND IMAGING | Facility: CLINIC | Age: 47
End: 2017-11-22
Attending: FAMILY MEDICINE
Payer: COMMERCIAL

## 2017-11-22 ENCOUNTER — RADIANT APPOINTMENT (OUTPATIENT)
Dept: MAMMOGRAPHY | Facility: CLINIC | Age: 47
End: 2017-11-22
Attending: FAMILY MEDICINE
Payer: COMMERCIAL

## 2017-11-22 DIAGNOSIS — R92.8 ABNORMAL MAMMOGRAM: ICD-10-CM

## 2017-11-22 PROCEDURE — 76642 ULTRASOUND BREAST LIMITED: CPT | Mod: RT

## 2017-11-22 PROCEDURE — G0206 DX MAMMO INCL CAD UNI: HCPCS | Mod: RT

## 2017-11-29 ENCOUNTER — OFFICE VISIT (OUTPATIENT)
Dept: FAMILY MEDICINE | Facility: CLINIC | Age: 47
End: 2017-11-29
Payer: COMMERCIAL

## 2017-11-29 VITALS
OXYGEN SATURATION: 99 % | HEART RATE: 96 BPM | DIASTOLIC BLOOD PRESSURE: 78 MMHG | WEIGHT: 170 LBS | BODY MASS INDEX: 27.29 KG/M2 | SYSTOLIC BLOOD PRESSURE: 116 MMHG | TEMPERATURE: 97.9 F

## 2017-11-29 DIAGNOSIS — J06.9 VIRAL UPPER RESPIRATORY TRACT INFECTION: Primary | ICD-10-CM

## 2017-11-29 DIAGNOSIS — H61.23 BILATERAL IMPACTED CERUMEN: ICD-10-CM

## 2017-11-29 PROCEDURE — 99213 OFFICE O/P EST LOW 20 MIN: CPT | Performed by: PHYSICIAN ASSISTANT

## 2017-11-29 NOTE — LETTER
[facility]  [address] [place] [address]  Phone: [phone]    November 29, 2017        Crystal Marcus  [address] [place] [address]          To whom it may concern:    RE: Crystal Marcus    Patient was seen and treated today at our clinic and missed work. She will remain out of work tomorrow, 11/30/17, if she has not noticed improvements in her symptoms. I anticipate her return on 12/1/17.    Please contact me for questions or concerns.      Sincerely,          Lesia Murillo PA-C

## 2017-11-29 NOTE — MR AVS SNAPSHOT
After Visit Summary   11/29/2017    Crystal Marcus    MRN: [id number]           Patient Information     Date Of Birth          1970        Visit Information        Provider Department      11/29/2017 9:20 AM Lesia Murillo PA-C [facility]        Care Instructions    Increase your water intake in order to keep the secretions/mucous in your upper respiratory tract thin. Get plenty of rest and wash your hands well. Follow up if symptoms fail to improve or worsen.      Call me on Monday if you haven't noticed any improvements.           Follow-ups after your visit        Who to contact     Normal or non-critical lab and imaging results will be communicated to you by [employer] Ceramics[employer], letter or phone within 4 business days after the clinic has received the results. If you do not hear from us within 7 days, please contact the clinic through Discerat or phone. If you have a critical or abnormal lab result, we will notify you by phone as soon as possible.  Submit refill requests through Pcsso or call your pharmacy and they will forward the refill request to us. Please allow 3 business days for your refill to be completed.          If you need to speak with a  for additional information , please call: [phone]             Additional Information About Your Visit        [employer] Information     Pcsso gives you secure access to your electronic health record. If you see a primary care provider, you can also send messages to your care team and make appointments. If you have questions, please call your primary care clinic.  If you do not have a primary care provider, please call [phone] and they will assist you.        Care EveryWhere ID     This is your Care EveryWhere ID. This could be used by other organizations to access your [place] medical records  [id number]        Your Vitals Were     Pulse Temperature Last Period Pulse Oximetry BMI (Body Mass Index)       96  97.9  F (36.6  C) (Oral) 12/07/2015 99% 27.29 kg/m2        Blood Pressure from Last 3 Encounters:   11/29/17 116/78   11/06/17 112/78   08/31/17 106/82    Weight from Last 3 Encounters:   11/29/17 170 lb (77.1 kg)   11/06/17 169 lb 8 oz (76.9 kg)   08/31/17 174 lb 8 oz (79.2 kg)              Today, you had the following     No orders found for display       Primary Care Provider Office Phone # Fax #    Maryse Rosales [phone]       [facility] [address]        Equal Access to Services     [facility] : Hadii chantal kerro Soanurag, waaxda luqadaha, qaybta kaalmada adeegyada, stephan fernandes . So [facility] [phone].    ATENCIÓN: Si habla español, tiene a haines disposición servicios gratuitos de asistencia lingüística. LlOhioHealth Grant Medical Center [phone].    We comply with applicable federal civil rights laws and [place] laws. We do not discriminate on the basis of race, color, national origin, age, disability, sex, sexual orientation, or gender identity.            Thank you!     Thank you for choosing [facility]  for your care. Our goal is always to provide you with excellent care. Hearing back from our patients is one way we can continue to improve our services. Please take a few minutes to complete the written survey that you may receive in the mail after your visit with us. Thank you!             Your Updated Medication List - Protect others around you: Learn how to safely use, store and throw away your medicines at www.disposemymeds.org.          This list is accurate as of: 11/29/17  9:36 AM.  Always use your most recent med list.                   Brand Name Dispense Instructions for use Diagnosis    desvenlafaxine succinate 50 MG 24 hr tablet    PRISTIQ    90 tablet    TAKE 1 TABLET (50 MG) BY MOUTH DAILY    Moderate recurrent major depression (H), LIDIA (generalized anxiety disorder)       ibuprofen 600 MG tablet    ADVIL/MOTRIN    60 tablet     Take 1 tablet (600 mg) by mouth every 6 hours as needed for pain (mild)    S/P hysterectomy       multivitamin, therapeutic with minerals Tabs tablet      Take 1 tablet by mouth daily

## 2017-11-29 NOTE — PROGRESS NOTES
SUBJECTIVE:  Crystal Marcus is a 46 year old female who presents with the following concerns;              Symptoms: cc Present Absent Comment   Fever/Chills   x    Fatigue  x     Muscle Aches   x    Eye Irritation  x  itchy   Sneezing   x    Nasal Delbert/Drg  x     Sinus Pressure/Pain   x    Loss of smell  x     Dental pain   x    Sore Throat   x    Swollen Glands   x    Ear Pain/Fullness   x    Cough  x     Wheeze   x    Chest Pain   x    Shortness of breath  x     Rash  x  Right forearm    Other         Symptom duration:  x8 days    Sympom severity:  moderate   Treatments tried:  OTC meds   Contacts:  work        Medications updated and reviewed.  Past, family and surgical history is updated and reviewed in the record.    ROS:  Other than noted above, general, HEENT, respiratory, cardiac and gastrointestinal systems are negative.    OBJECTIVE:  /78  Pulse 96  Temp 97.9  F (36.6  C) (Oral)  Wt 170 lb (77.1 kg)  LMP 12/07/2015  SpO2 99%  BMI 27.29 kg/m2  GENERAL: Pleasant and interactive. No acute distress.  HEENT: Mild injection of conjunctiva. TMs clear. Oropharynx moist and clear.   NECK: supple and free of adenopathy or masses, the thyroid is normal without enlargement or nodules.  CHEST:  clear, no wheezing or rales. Normal symmetric air entry throughout both lung fields. No chest wall deformities or tenderness.  HEART:  S1 and S2 normal, no murmurs, clicks, gallops or rubs. Regular rate and rhythm.  SKIN:  Only benign skin findings. No unusual rashes or suspicious skin lesions noted. Nails appear normal.      Assessment:    1. Viral upper respiratory tract infection    2. Bilateral impacted cerumen         Ears irrigated by Lana RAMOS.    Plan:   Patient Instructions   Increase your water intake in order to keep the secretions/mucous in your upper respiratory tract thin. Get plenty of rest and wash your hands well. Follow up if symptoms fail to improve or worsen.      Call me on Monday if you haven't  noticed any improvements.       The patient was in agreement with the plan today and had no questions or concerns prior to leaving the clinic.     Lesia Murillo PA-C

## 2017-11-29 NOTE — PATIENT INSTRUCTIONS
Increase your water intake in order to keep the secretions/mucous in your upper respiratory tract thin. Get plenty of rest and wash your hands well. Follow up if symptoms fail to improve or worsen.      Call me on Monday if you haven't noticed any improvements.

## 2018-03-26 ENCOUNTER — OFFICE VISIT (OUTPATIENT)
Dept: FAMILY MEDICINE | Facility: CLINIC | Age: 48
End: 2018-03-26
Payer: COMMERCIAL

## 2018-03-26 VITALS
HEIGHT: 67 IN | WEIGHT: 172 LBS | HEART RATE: 74 BPM | SYSTOLIC BLOOD PRESSURE: 118 MMHG | OXYGEN SATURATION: 99 % | BODY MASS INDEX: 27 KG/M2 | DIASTOLIC BLOOD PRESSURE: 77 MMHG | TEMPERATURE: 97.6 F

## 2018-03-26 DIAGNOSIS — S33.5XXA LUMBAR SPRAIN, INITIAL ENCOUNTER: Primary | ICD-10-CM

## 2018-03-26 PROCEDURE — 99214 OFFICE O/P EST MOD 30 MIN: CPT | Performed by: PHYSICIAN ASSISTANT

## 2018-03-26 RX ORDER — METHOCARBAMOL 500 MG/1
1000 TABLET, FILM COATED ORAL 3 TIMES DAILY PRN
Qty: 30 TABLET | Refills: 1 | Status: SHIPPED | OUTPATIENT
Start: 2018-03-26 | End: 2019-01-29

## 2018-03-26 ASSESSMENT — ANXIETY QUESTIONNAIRES
GAD7 TOTAL SCORE: 0
5. BEING SO RESTLESS THAT IT IS HARD TO SIT STILL: NOT AT ALL
6. BECOMING EASILY ANNOYED OR IRRITABLE: NOT AT ALL
7. FEELING AFRAID AS IF SOMETHING AWFUL MIGHT HAPPEN: NOT AT ALL
2. NOT BEING ABLE TO STOP OR CONTROL WORRYING: NOT AT ALL
IF YOU CHECKED OFF ANY PROBLEMS ON THIS QUESTIONNAIRE, HOW DIFFICULT HAVE THESE PROBLEMS MADE IT FOR YOU TO DO YOUR WORK, TAKE CARE OF THINGS AT HOME, OR GET ALONG WITH OTHER PEOPLE: NOT DIFFICULT AT ALL
3. WORRYING TOO MUCH ABOUT DIFFERENT THINGS: NOT AT ALL
1. FEELING NERVOUS, ANXIOUS, OR ON EDGE: NOT AT ALL

## 2018-03-26 ASSESSMENT — PATIENT HEALTH QUESTIONNAIRE - PHQ9: 5. POOR APPETITE OR OVEREATING: NOT AT ALL

## 2018-03-26 NOTE — MR AVS SNAPSHOT
After Visit Summary   3/26/2018    Crystal Marcus    MRN: [id number]           Patient Information     Date Of Birth          1970        Visit Information        Provider Department      3/26/2018 9:40 AM Crystal Abarca PA-C [facility]        Today's Diagnoses     Lumbar sprain, initial encounter    -  1      Care Instructions    Do not mix robaxin with alcohol or drive after taking until you know how it affects you  Schedule with physical therapy  Continue ibuprofen 600 mg with food 2-3 times a day as needed            Follow-ups after your visit        Additional Services     [facility] PT, HAND, AND CHIROPRACTIC REFERRAL       **This order will print in the [facility] Scheduling Office**    Physical Therapy, Hand Therapy and Chiropractic Care are available through:    *[facility]  *[facility]  *[facility]    Call one number to schedule at any of the above locations: [phone].    Your provider has referred you to: Physical Therapy at [facility] or [facility]    Indication/Reason for Referral: Low Back Pain  Onset of Illness: weeks  Therapy Orders: Evaluate and Treat  Special Programs: None  Special Request: Manual Therapy: Myofascial Release/Massage  None    Renetta Jackson      Additional Comments for the Therapist or Chiropractor:     Please be aware that coverage of these services is subject to the terms and limitations of your health insurance plan.  Call member services at your health plan with any benefit or coverage questions.      Please bring the following to your appointment:    *Your personal calendar for scheduling future appointments  *Comfortable clothing                  Who to contact     If you have questions or need follow up information about today's clinic visit or your schedule please contact [facility] directly at [phone].  Normal or non-critical lab and imaging results will be communicated to  "you by MyChart, letter or phone within 4 business days after the clinic has received the results. If you do not hear from us within 7 days, please contact the clinic through [email or web address] or phone. If you have a critical or abnormal lab result, we will notify you by phone as soon as possible.  Submit refill requests through [email or web address] or call your pharmacy and they will forward the refill request to us. Please allow 3 business days for your refill to be completed.          Additional Information About Your Visit        [employer] Knowledge Information     [email or web address] gives you secure access to your electronic health record. If you see a primary care provider, you can also send messages to your care team and make appointments. If you have questions, please call your primary care clinic.  If you do not have a primary care provider, please call [phone] and they will assist you.        Care EveryWhere ID     This is your Care EveryWhere ID. This could be used by other organizations to access your [place] medical records  [id number]        Your Vitals Were     Pulse Temperature Height Last Period Pulse Oximetry Breastfeeding?    74 97.6  F (36.4  C) (Oral) 5' 7\" (1.702 m) 12/07/2015 99% No    BMI (Body Mass Index)                   26.94 kg/m2            Blood Pressure from Last 3 Encounters:   03/26/18 118/77   11/29/17 116/78   11/06/17 112/78    Weight from Last 3 Encounters:   03/26/18 172 lb (78 kg)   11/29/17 170 lb (77.1 kg)   11/06/17 169 lb 8 oz (76.9 kg)              We Performed the Following     [facility] PT, HAND, AND CHIROPRACTIC REFERRAL          Today's Medication Changes          These changes are accurate as of 3/26/18  9:58 AM.  If you have any questions, ask your nurse or doctor.               Start taking these medicines.        Dose/Directions    methocarbamol 500 MG tablet   Commonly known as:  ROBAXIN   Used for:  Lumbar sprain, initial encounter   Started by:  Crystal Abarca PA-C        Dose:  1000 mg   Take " 2 tablets (1,000 mg) by mouth 3 times daily as needed for muscle spasms   Quantity:  30 tablet   Refills:  1            Where to get your medicines      These medications were sent to [facility]/pharmacy #[id number] - [place], [place] - [address]  [address] [place] [address]     Phone:  [phone]     methocarbamol 500 MG tablet                Primary Care Provider Office Phone # Fax #    Marsye Rosales [phone]        [facility] [address] [place] [address]        Equal Access to Services     [facility]: Hadii aad ku hadasho Soomaali, waaxda luqadaha, qaybta kaalmada adeegyada, stephan fernandes . So [facility] [phone].    ATENCIÓN: Si habla español, tiene a haines disposición servicios gratuitos de asistencia lingüística. [facility] [phone].    We comply with applicable federal civil rights laws and [place] laws. We do not discriminate on the basis of race, color, national origin, age, disability, sex, sexual orientation, or gender identity.            Thank you!     Thank you for choosing [facility]  for your care. Our goal is always to provide you with excellent care. Hearing back from our patients is one way we can continue to improve our services. Please take a few minutes to complete the written survey that you may receive in the mail after your visit with us. Thank you!             Your Updated Medication List - Protect others around you: Learn how to safely use, store and throw away your medicines at www.disposemymeds.org.          This list is accurate as of 3/26/18  9:58 AM.  Always use your most recent med list.                   Brand Name Dispense Instructions for use Diagnosis    desvenlafaxine succinate 50 MG 24 hr tablet    PRISTIQ    90 tablet    TAKE 1 TABLET (50 MG) BY MOUTH DAILY    Moderate recurrent major depression (H), LIDIA (generalized anxiety disorder)       ibuprofen  600 MG tablet    ADVIL/MOTRIN    60 tablet    Take 1 tablet (600 mg) by mouth every 6 hours as needed for pain (mild)    S/P hysterectomy       methocarbamol 500 MG tablet    ROBAXIN    30 tablet    Take 2 tablets (1,000 mg) by mouth 3 times daily as needed for muscle spasms    Lumbar sprain, initial encounter       multivitamin, therapeutic with minerals Tabs tablet      Take 1 tablet by mouth daily

## 2018-03-26 NOTE — PATIENT INSTRUCTIONS
Do not mix robaxin with alcohol or drive after taking until you know how it affects you  Schedule with physical therapy  Continue ibuprofen 600 mg with food 2-3 times a day as needed

## 2018-03-26 NOTE — PROGRESS NOTES
SUBJECTIVE:                                                    Crystal Marcus is a 47 year old female who presents to clinic today for the following health issues:    Back Pain       Duration: x 2-3 weeks on and off worst the past few days        Specific cause: none    Description:   Location of pain: low back bilateral  Character of pain: sharp  Pain radiation:none  New numbness or weakness in legs, not attributed to pain:  no     Intensity: moderate    History:   Pain interferes with job: YES  History of back problems: no prior back problems  Any previous MRI or X-rays: None  Sees a specialist for back pain:  No  Therapies tried without relief: ibuprofen, cold, massage, rest and stretch    Alleviating factors:   Improved by: Feels a little better but still noticing pain      Precipitating factors:  Worsened by: Bending and Coughing    Functional and Psychosocial Screen (Renetta STarT Back):      Not performed today        Accompanying Signs & Symptoms:  Risk of Fracture:  None  Risk of Cauda Equina:  None  Risk of Infection:  None  Risk of Cancer:  None  Risk of Ankylosing Spondylitis:  Onset at age <35, male, AND morning back stiffness. no         Patient with new onset lumbago. had rare back pain off and on for years but this was mild, lasted a day then went away after taking ibuprofen.   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  Wears shoe insert.  No known injury. Has tried icing and ibuprofen.   This weekend was the worst.  Was pretty active this weekend. Never used muscle relaxant.  Leaning back feels better, forward is worse, side hurts.     Hurts to bend over, makes it hard to sleep. No radiation into leg.  No weakness.  Has never done physical therapy before.  No formal exercise currently.   Does not do core strength exercises.   Works full time and is in school.  We discussed even 5 min of core exercises is better than nothing if she is prone to lumbago.    No known h/o of scoliosis. No h.o back surgery.     No red flags.       Problem list and histories reviewed & adjusted, as indicated.  Additional history: as documented    Patient Active Problem List   Diagnosis     FH: breast cancer in first degree relative     CARDIOVASCULAR SCREENING; LDL GOAL LESS THAN 160     Hot flashes     Vitamin D deficiency     Constipation     Ureterocele, acquired     Overweight     Past Surgical History:   Procedure Laterality Date     BIOPSY  1995, 2015     DAVINCI HYSTERECTOMY TOTAL, SALPINGECTOMY BILATERAL Bilateral 12/11/2015    Procedure: DAVINCI HYSTERECTOMY TOTAL, SALPINGECTOMY BILATERAL;  Surgeon: Dary Bloom MD;  Location: [facility]     HYSTERECTOMY, PAP NO LONGER INDICATED       SURGICAL HISTORY OF -   1990    Cryotherapy     wisdom teeth removal         Social History   Substance Use Topics     Smoking status: Former Smoker     Packs/day: 0.20     Years: 12.00     Types: Cigarettes     Start date: 1/1/1988     Quit date: 9/1/2000     Smokeless tobacco: Never Used     Alcohol use Yes      Comment: 5 drinks a week     Family History   Problem Relation Age of Onset     Breast Cancer Mother      Lipids Mother      Hypertension Mother      Breast Cancer Maternal Grandmother      Hypertension Father      Seizure Disorder Brother          Current Outpatient Prescriptions   Medication Sig Dispense Refill     methocarbamol (ROBAXIN) 500 MG tablet Take 2 tablets (1,000 mg) by mouth 3 times daily as needed for muscle spasms 30 tablet 1     desvenlafaxine succinate (PRISTIQ) 50 MG 24 hr tablet TAKE 1 TABLET (50 MG) BY MOUTH DAILY 90 tablet 1     multivitamin, therapeutic with minerals (THERA-VIT-M) TABS Take 1 tablet by mouth daily       ibuprofen (ADVIL,MOTRIN) 600 MG tablet Take 1 tablet (600 mg) by mouth every 6 hours as needed for pain (mild) 60 tablet 0     Allergies   Allergen Reactions     Sulfa Drugs Rash     SULFA       ROS:  Constitutional, HEENT, cardiovascular, pulmonary,  "GI, , musculoskeletal, neuro, skin, endocrine and psych systems are negative, except as otherwise noted.    OBJECTIVE:     /77  Pulse 74  Temp 97.6  F (36.4  C) (Oral)  Ht 5' 7\" (1.702 m)  Wt 172 lb (78 kg)  LMP 2015  SpO2 99%  Breastfeeding? No  BMI 26.94 kg/m2  Body mass index is 26.94 kg/(m^2).  GENERAL:  No acute distress.  Interacts and answers questions appropriately.  Alert and oriented.  HEENT:   Oral mucosa moist.  Posterior pharynx non-erythematous.  NECK:  Soft and supple.  without tenderness.    Normal range of motion.    CARDIAC:   Regular rate and rhythm.  No murmurs, rubs, or gallops.   PULMONARY: Clear to auscultation bilaterally.  No  wheezes, crackles, or rhonchi.  Normal air exchange/breath sounds.   MUSCULOSKELETAL:  Low  over midline and slightly to the right paraspinous region, paralumbar muscles feel tense.     No erythema, ecchymosis, edema, or warmth.  Range of motion not assessed due to pain with flexion, she is able to extend at back normally.   Hip and knee strength not well assessed due to pain with this. Distal sensation and pulses intact.     NEUROLOGICAL:  Alert and oriented.  Gait normal.  Cranial nerves II-XII grossly intact.   PSYCH: Normal affect.  SKIN: No rashes.          ASSESSMENT/PLAN:     ASSESSMENT / PLAN:  (S33.5XXA) Lumbar sprain, initial encounter  (primary encounter diagnosis)  Comment: less than 6 weeks, no red flags, no imaging needed currently.  Next step would be ortho if not improving with conservative measures and physical therapy. Discussed importance of routine exercise also.  Plan: methocarbamol (ROBAXIN) 500 MG tablet, [facility] PT,         HAND, AND CHIROPRACTIC REFERRAL        To emergency room with severe worsening symptoms  F/u if not improving in 3 weeks or if worsening somewhat  Billin min spent face-to-face with patient. 15 min on history, 4 on exam, 6 on discussing diagnosis and treatment plan.       Patient Instructions "   Do not mix robaxin with alcohol or drive after taking until you know how it affects you  Schedule with physical therapy  Continue ibuprofen 600 mg with food 2-3 times a day as needed        Crystal Abarca PA-C  [facility]

## 2018-03-26 NOTE — NURSING NOTE
"Chief Complaint   Patient presents with     Back Pain     Lower back pain x 2-3 weeks worst the past 2 days no known injury       Initial /77  Pulse 74  Temp 97.6  F (36.4  C) (Oral)  Ht 5' 7\" (1.702 m)  Wt 172 lb (78 kg)  LMP 12/07/2015  SpO2 99%  Breastfeeding? No  BMI 26.94 kg/m2 Estimated body mass index is 26.94 kg/(m^2) as calculated from the following:    Height as of this encounter: 5' 7\" (1.702 m).    Weight as of this encounter: 172 lb (78 kg).  Medication Reconciliation: complete      Ivette Steele MA    "

## 2018-03-27 ASSESSMENT — ANXIETY QUESTIONNAIRES: GAD7 TOTAL SCORE: 0

## 2018-03-27 ASSESSMENT — PATIENT HEALTH QUESTIONNAIRE - PHQ9: SUM OF ALL RESPONSES TO PHQ QUESTIONS 1-9: 3

## 2018-05-21 ENCOUNTER — OFFICE VISIT (OUTPATIENT)
Dept: FAMILY MEDICINE | Facility: CLINIC | Age: 48
End: 2018-05-21
Payer: COMMERCIAL

## 2018-05-21 VITALS
RESPIRATION RATE: 16 BRPM | WEIGHT: 175.4 LBS | BODY MASS INDEX: 27.47 KG/M2 | HEART RATE: 88 BPM | SYSTOLIC BLOOD PRESSURE: 121 MMHG | DIASTOLIC BLOOD PRESSURE: 80 MMHG | TEMPERATURE: 97.5 F | OXYGEN SATURATION: 96 %

## 2018-05-21 DIAGNOSIS — J40 BRONCHITIS: ICD-10-CM

## 2018-05-21 DIAGNOSIS — J06.9 UPPER RESPIRATORY TRACT INFECTION, UNSPECIFIED TYPE: Primary | ICD-10-CM

## 2018-05-21 PROCEDURE — 99213 OFFICE O/P EST LOW 20 MIN: CPT | Performed by: FAMILY MEDICINE

## 2018-05-21 RX ORDER — AZITHROMYCIN 250 MG/1
TABLET, FILM COATED ORAL
Qty: 6 TABLET | Refills: 0 | Status: SHIPPED | OUTPATIENT
Start: 2018-05-21 | End: 2018-07-02

## 2018-05-21 NOTE — PATIENT INSTRUCTIONS
Take prescribed medication as directed.    Try generic Claritin D, Allegra D or Zyrtec D. Ask Pharmacist for these.

## 2018-05-21 NOTE — PROGRESS NOTES
CHIEF COMPLAINT    Cough      HISTORY    She has a one-week history of head congestion and cough.  She has no history of asthma.  Remote history of smoking.     Patient Active Problem List   Diagnosis     FH: breast cancer in first degree relative     CARDIOVASCULAR SCREENING; LDL GOAL LESS THAN 160     Hot flashes     Vitamin D deficiency     Constipation     Ureterocele, acquired     Overweight       REVIEW OF SYSTEMS    No ear or throat pain.  No wheeze RS OB.  No chest pain.      Past Medical History:   Diagnosis Date     Arthritis 1/1/2006    X-rays show arthritis in my hips     Complex ovarian cyst 1/15/2015     Depressive disorder      Depressive disorder, not elsewhere classified      FH: breast cancer in first degree relative 8/30/2011     FH: breast cancer in first degree relative      Hydronephrosis 1/23/2015       EXAM  /80  Pulse 88  Temp 97.5  F (36.4  C) (Oral)  Resp 16  Wt 175 lb 6.4 oz (79.6 kg)  LMP 12/07/2015  SpO2 96%  BMI 27.47 kg/m2    Tympanic membranes normal.  Pharynx not especially inflamed.  Mildly enlarged anterior cervical nodes.    Chest clear.        (J06.9) Upper respiratory tract infection, unspecified type  (primary encounter diagnosis)  Comment:   Plan: azithromycin (ZITHROMAX) 250 MG tablet            (J40) Bronchitis  Comment:   Plan: azithromycin (ZITHROMAX) 250 MG tablet            Take prescribed medication as directed.    Try generic Claritin D, Allegra D or Zyrtec D. Ask Pharmacist for these.

## 2018-05-21 NOTE — MR AVS SNAPSHOT
After Visit Summary   5/21/2018    Crystal Marcus    MRN: [id number]           Patient Information     Date Of Birth          1970        Visit Information        Provider Department      5/21/2018 10:20 AM Azar Paez MD [facility]        Today's Diagnoses     Upper respiratory tract infection, unspecified type    -  1    Bronchitis          Care Instructions        Take prescribed medication as directed.    Try generic Claritin D, Allegra D or Zyrtec D. Ask Pharmacist for these.            Follow-ups after your visit        Who to contact     If you have questions or need follow up information about today's clinic visit or your schedule please contact [facility] directly at [phone].  Normal or non-critical lab and imaging results will be communicated to you by MyChart, letter or phone within 4 business days after the clinic has received the results. If you do not hear from us within 7 days, please contact the clinic through [email or web address] or phone. If you have a critical or abnormal lab result, we will notify you by phone as soon as possible.  Submit refill requests through [employer] or call your pharmacy and they will forward the refill request to us. Please allow 3 business days for your refill to be completed.          Additional Information About Your Visit        MyChart Information     [employer] gives you secure access to your electronic health record. If you see a primary care provider, you can also send messages to your care team and make appointments. If you have questions, please call your primary care clinic.  If you do not have a primary care provider, please call [phone] and they will assist you.        Care EveryWhere ID     This is your Care EveryWhere ID. This could be used by other organizations to access your [place] medical records  [id number]        Your Vitals Were     Pulse Temperature Respirations Last Period Pulse Oximetry BMI (Body  Mass Index)    88 97.5  F (36.4  C) (Oral) 16 12/07/2015 96% 27.47 kg/m2       Blood Pressure from Last 3 Encounters:   05/21/18 121/80   03/26/18 118/77   11/29/17 116/78    Weight from Last 3 Encounters:   05/21/18 175 lb 6.4 oz (79.6 kg)   03/26/18 172 lb (78 kg)   11/29/17 170 lb (77.1 kg)              Today, you had the following     No orders found for display         Today's Medication Changes          These changes are accurate as of 5/21/18 10:47 AM.  If you have any questions, ask your nurse or doctor.               Start taking these medicines.        Dose/Directions    azithromycin 250 MG tablet   Commonly known as:  ZITHROMAX   Used for:  Bronchitis, Upper respiratory tract infection, unspecified type   Started by:  Azar Paez MD        Two tablets first day, then one tablet daily for four days.   Quantity:  6 tablet   Refills:  0            Where to get your medicines      These medications were sent to [facility]/pharmacy #[id number] - [place] - [address] [facility]  [address], [place] [address]     Phone:  [phone]     azithromycin 250 MG tablet                Primary Care Provider Office Phone # Fax #    Maryse Rosales [phone]       [facility]  [place] [address]        Equal Access to Services     [facility]: Hadii chantal ku hadasho Soyamilethali, waaxda luqadaha, qaybta kaalmada chelsiyada, waxay cody fernandes . So [facility] [phone].    ATENCIÓN: Si habla español, tiene a haines disposición servicios gratuitos de asistencia lingüística. Llame al [phone].    We comply with applicable federal civil rights laws and [place] laws. We do not discriminate on the basis of race, color, national origin, age, disability, sex, sexual orientation, or gender identity.            Thank you!     Thank you for choosing [facility]  [facility]. Our goal is always to provide you with excellent care.  Hearing back from our patients is one way we can continue to improve our services. Please take a few minutes to complete the written survey that you may receive in the mail after your visit with us. Thank you!             Your Updated Medication List - Protect others around you: Learn how to safely use, store and throw away your medicines at www.disposemymeds.org.          This list is accurate as of 5/21/18 10:47 AM.  Always use your most recent med list.                   Brand Name Dispense Instructions for use Diagnosis    azithromycin 250 MG tablet    ZITHROMAX    6 tablet    Two tablets first day, then one tablet daily for four days.    Bronchitis, Upper respiratory tract infection, unspecified type       desvenlafaxine succinate 50 MG 24 hr tablet    PRISTIQ    90 tablet    TAKE 1 TABLET (50 MG) BY MOUTH DAILY    Moderate recurrent major depression (H), LIDIA (generalized anxiety disorder)       ibuprofen 600 MG tablet    ADVIL/MOTRIN    60 tablet    Take 1 tablet (600 mg) by mouth every 6 hours as needed for pain (mild)    S/P hysterectomy       methocarbamol 500 MG tablet    ROBAXIN    30 tablet    Take 2 tablets (1,000 mg) by mouth 3 times daily as needed for muscle spasms    Lumbar sprain, initial encounter       multivitamin, therapeutic with minerals Tabs tablet      Take 1 tablet by mouth daily

## 2018-06-25 DIAGNOSIS — F33.1 MODERATE RECURRENT MAJOR DEPRESSION (H): ICD-10-CM

## 2018-06-25 DIAGNOSIS — F41.1 GAD (GENERALIZED ANXIETY DISORDER): ICD-10-CM

## 2018-06-25 RX ORDER — DESVENLAFAXINE 50 MG/1
TABLET, FILM COATED, EXTENDED RELEASE ORAL
Qty: 90 TABLET | Refills: 1 | Status: CANCELLED | OUTPATIENT
Start: 2018-06-25

## 2018-06-25 NOTE — TELEPHONE ENCOUNTER
"Routing refill request to provider most recently seen for review/approval because:  Labs not current:  Cr      Requested Prescriptions   Pending Prescriptions Disp Refills     desvenlafaxine succinate (PRISTIQ) 50 MG 24 hr tablet  Last Written Prescription Date:  8/31/17  Last Fill Quantity: 90,  # refills: 1   Last office visit: 5/21/2018 with prescribing provider:     Future Office Visit:     90 tablet 1     Sig: TAKE 1 TABLET (50 MG) BY MOUTH DAILY    Serotonin-Norepinephrine Reuptake Inhibitors  Failed    6/25/2018 11:05 AM       Failed - Normal serum creatinine on file in past 12 months    Recent Labs   Lab Test  01/11/16   1552   CR  0.75            Passed - Blood pressure under 140/90 in past 12 months    BP Readings from Last 3 Encounters:   05/21/18 121/80   03/26/18 118/77   11/29/17 116/78                Passed - PHQ-9 score of less than 5 in past 6 months    Please review last PHQ-9 score.          Passed - Patient is age 18 or older       Passed - No active pregnancy on record       Passed - No positive pregnancy test in past 12 months       Passed - Recent (6 mo) or future (30 days) visit within the authorizing provider's specialty    Patient had office visit in the last 6 months or has a visit in the next 30 days with authorizing provider or within the authorizing provider's specialty.  See \"Patient Info\" tab in inbasket, or \"Choose Columns\" in Meds & Orders section of the refill encounter.            Melba Chi RN - BC      "

## 2018-06-26 PROBLEM — E66.3 OVERWEIGHT: Status: RESOLVED | Noted: 2017-11-08 | Resolved: 2018-06-26

## 2018-06-26 RX ORDER — DESVENLAFAXINE 50 MG/1
TABLET, FILM COATED, EXTENDED RELEASE ORAL
Start: 2018-06-26

## 2018-06-26 NOTE — TELEPHONE ENCOUNTER
Break in therapy - PCP no longer with clinic - e-visit [facility]     Refused Prescriptions:                       Disp   Refills    desvenlafaxine succinate (PRISTIQ) 50 MG 2*                Sig: TAKE 1 TABLET (50 MG) BY MOUTH DAILY  Refused By: TEJ BUTTS  Reason for Refusal: Appt required, please call patient

## 2018-06-28 NOTE — PROGRESS NOTES
SUBJECTIVE:                                                    Crystal Marcus is a 47 year old female who presents to clinic today for the following health issues:    Depression and Anxiety Follow-Up    Status since last visit: Stable    Other associated symptoms:None    Complicating factors:     Significant life event: No     Current substance abuse: None    PHQ-9 7/11/2017 8/31/2017 3/26/2018   Total Score 4 6 3   Q9: Suicide Ideation Not at all Not at all Not at all     LIDIA-7 SCORE 7/11/2017 8/31/2017 3/26/2018   Total Score - - -   Total Score 0 0 0       PHQ-9  English  PHQ-9   Any Language  LIDIA-7  Suicide Assessment Five-step Evaluation and Treatment (SAFE-T)    Amount of exercise or physical activity: 4-5 days/week for an average of 30-45 minutes    Problems taking medications regularly: No    Medication side effects: none    Diet: regular (no restrictions)    Doing really well on pristique.  Scores are great today. Denies suicidal or homicidal thoughts.  Patient instructed to go to the emergency room or call 911 if these occur.    Previously saw counselor. Not needing currently.     Due for fasting labs.           Problem list and histories reviewed & adjusted, as indicated.  Additional history: as documented    Patient Active Problem List   Diagnosis     FH: breast cancer in first degree relative     CARDIOVASCULAR SCREENING; LDL GOAL LESS THAN 160     Hot flashes     Vitamin D deficiency     Constipation     Ureterocele, acquired     Past Surgical History:   Procedure Laterality Date     BIOPSY  1995, 2015     DAVINCI HYSTERECTOMY TOTAL, SALPINGECTOMY BILATERAL Bilateral 12/11/2015    Procedure: DAVINCI HYSTERECTOMY TOTAL, SALPINGECTOMY BILATERAL;  Surgeon: Dary Bloom MD;  Location: [facility]     HYSTERECTOMY, PAP NO LONGER INDICATED       SURGICAL HISTORY OF -   1990    Cryotherapy     wisdom teeth removal         Social History   Substance Use Topics     Smoking status: Former Smoker     Packs/day:  0.20     Years: 12.00     Types: Cigarettes     Start date: 1/1/1988     Quit date: 9/1/2000     Smokeless tobacco: Never Used     Alcohol use Yes      Comment: 5 drinks a week     Family History   Problem Relation Age of Onset     Breast Cancer Mother      Lipids Mother      Hypertension Mother      Breast Cancer Maternal Grandmother      Hypertension Father      Seizure Disorder Brother          Current Outpatient Prescriptions   Medication Sig Dispense Refill     desvenlafaxine succinate (PRISTIQ) 50 MG 24 hr tablet TAKE 1 TABLET (50 MG) BY MOUTH DAILY 90 tablet 3     ibuprofen (ADVIL,MOTRIN) 600 MG tablet Take 1 tablet (600 mg) by mouth every 6 hours as needed for pain (mild) 60 tablet 0     methocarbamol (ROBAXIN) 500 MG tablet Take 2 tablets (1,000 mg) by mouth 3 times daily as needed for muscle spasms 30 tablet 1     multivitamin, therapeutic with minerals (THERA-VIT-M) TABS Take 1 tablet by mouth daily       [DISCONTINUED] desvenlafaxine succinate (PRISTIQ) 50 MG 24 hr tablet TAKE 1 TABLET (50 MG) BY MOUTH DAILY 90 tablet 1     Allergies   Allergen Reactions     Sulfa Drugs Rash     SULFA       ROS:  Constitutional, HEENT, cardiovascular, pulmonary, gi and gu systems are negative, except as otherwise noted.    OBJECTIVE:     /76  Pulse 79  Temp 97  F (36.1  C) (Oral)  Resp 16  Wt 176 lb (79.8 kg)  LMP 12/07/2015  SpO2 98%  Breastfeeding? No  BMI 27.57 kg/m2  Body mass index is 27.57 kg/(m^2).  GENERAL: healthy, alert and no distress  RESP: lungs clear to auscultation - no rales, rhonchi or wheezes  CV: regular rate and rhythm, normal S1 S2, no S3 or S4, no murmur, click or rub, no peripheral edema and peripheral pulses strong  MS: no gross musculoskeletal defects noted, no edema  PSYCH: mentation appears normal, affect normal/bright    Diagnostic Test Results:  none     ASSESSMENT/PLAN:     ASSESSMENT / PLAN:  (F33.1) Moderate recurrent major depression (H)  (primary encounter  diagnosis)  Comment:   Plan: desvenlafaxine succinate (PRISTIQ) 50 MG 24 hr         tablet            Doing well see below    (F41.1) LIDIA (generalized anxiety disorder)  Comment:   Plan: desvenlafaxine succinate (PRISTIQ) 50 MG 24 hr         tablet            (Z13.1) Screening for diabetes mellitus  Comment:   Plan: Comprehensive metabolic panel            (Z13.220) Lipid screening  Comment:   Plan: Lipid panel reflex to direct LDL Fasting            Patient Instructions   Please return fasting for cholesterol recheck, you can make a lab only appointment for this.  No food or drink other than water for 10 hours.      Recheck pristiq in 1 year, sooner if needed        Crystal Abarca PA-C  [facility]

## 2018-07-02 ENCOUNTER — OFFICE VISIT (OUTPATIENT)
Dept: FAMILY MEDICINE | Facility: CLINIC | Age: 48
End: 2018-07-02
Payer: COMMERCIAL

## 2018-07-02 VITALS
WEIGHT: 176 LBS | BODY MASS INDEX: 27.57 KG/M2 | SYSTOLIC BLOOD PRESSURE: 121 MMHG | DIASTOLIC BLOOD PRESSURE: 76 MMHG | RESPIRATION RATE: 16 BRPM | OXYGEN SATURATION: 98 % | HEART RATE: 79 BPM | TEMPERATURE: 97 F

## 2018-07-02 DIAGNOSIS — F41.1 GAD (GENERALIZED ANXIETY DISORDER): ICD-10-CM

## 2018-07-02 DIAGNOSIS — Z13.1 SCREENING FOR DIABETES MELLITUS: ICD-10-CM

## 2018-07-02 DIAGNOSIS — Z13.220 LIPID SCREENING: ICD-10-CM

## 2018-07-02 DIAGNOSIS — F33.1 MODERATE RECURRENT MAJOR DEPRESSION (H): Primary | ICD-10-CM

## 2018-07-02 PROCEDURE — 99213 OFFICE O/P EST LOW 20 MIN: CPT | Performed by: PHYSICIAN ASSISTANT

## 2018-07-02 RX ORDER — DESVENLAFAXINE 50 MG/1
TABLET, FILM COATED, EXTENDED RELEASE ORAL
Qty: 90 TABLET | Refills: 3 | Status: SHIPPED | OUTPATIENT
Start: 2018-07-02 | End: 2019-07-16

## 2018-07-02 ASSESSMENT — ANXIETY QUESTIONNAIRES
5. BEING SO RESTLESS THAT IT IS HARD TO SIT STILL: SEVERAL DAYS
7. FEELING AFRAID AS IF SOMETHING AWFUL MIGHT HAPPEN: NOT AT ALL
3. WORRYING TOO MUCH ABOUT DIFFERENT THINGS: NOT AT ALL
6. BECOMING EASILY ANNOYED OR IRRITABLE: NOT AT ALL
2. NOT BEING ABLE TO STOP OR CONTROL WORRYING: NOT AT ALL
IF YOU CHECKED OFF ANY PROBLEMS ON THIS QUESTIONNAIRE, HOW DIFFICULT HAVE THESE PROBLEMS MADE IT FOR YOU TO DO YOUR WORK, TAKE CARE OF THINGS AT HOME, OR GET ALONG WITH OTHER PEOPLE: NOT DIFFICULT AT ALL
1. FEELING NERVOUS, ANXIOUS, OR ON EDGE: NOT AT ALL
GAD7 TOTAL SCORE: 1

## 2018-07-02 ASSESSMENT — PATIENT HEALTH QUESTIONNAIRE - PHQ9: 5. POOR APPETITE OR OVEREATING: NOT AT ALL

## 2018-07-02 NOTE — LETTER
My Depression Action Plan  Name: Crystal Marcus   Date of Birth 1970  Date: 6/28/2018    My doctor: Maryse Rosales   My clinic: [facility]  [id number] [id number] [phone]          GREEN    ZONE   Good Control    What it looks like:     Things are going generally well. You have normal up s and down s. You may even feel depressed from time to time, but bad moods usually last less than a day.   What you need to do:  1. Continue to care for yourself (see self care plan)  2. Check your depression survival kit and update it as needed  3. Follow your physician s recommendations including any medication.  4. Do not stop taking medication unless you consult with your physician first.           YELLOW         ZONE Getting Worse    What it looks like:     Depression is starting to interfere with your life.     It may be hard to get out of bed; you may be starting to isolate yourself from others.    Symptoms of depression are starting to last most all day and this has happened for several days.     You may have suicidal thoughts but they are not constant.   What you need to do:     1. Call your care team, your response to treatment will improve if you keep your care team informed of your progress. Yellow periods are signs an adjustment may need to be made.     2. Continue your self-care, even if you have to fake it!    3. Talk to someone in your support network    4. Open up your depression survival kit           RED    ZONE Medical Alert - Get Help    What it looks like:     Depression is seriously interfering with your life.     You may experience these or other symptoms: You can t get out of bed most days, can t work or engage in other necessary activities, you have trouble taking care of basic hygiene, or basic responsibilities, thoughts of suicide or death that will not go away, self-injurious behavior.     What you need to do:  1. Call your care team and  request a same-day appointment. If they are not available (weekends or after hours) call your local crisis line, emergency room or 911.            Depression Self Care Plan / Survival Kit    Self-Care for Depression  Here s the deal. Your body and mind are really not as separate as most people think.  What you do and think affects how you feel and how you feel influences what you do and think. This means if you do things that people who feel good do, it will help you feel better.  Sometimes this is all it takes.  There is also a place for medication and therapy depending on how severe your depression is, so be sure to consult with your medical provider and/ or Behavioral Health Consultant if your symptoms are worsening or not improving.     In order to better manage my stress, I will:    Exercise  Get some form of exercise, every day. This will help reduce pain and release endorphins, the  feel good  chemicals in your brain. This is almost as good as taking antidepressants!  This is not the same as joining a gym and then never going! (they count on that by the way ) It can be as simple as just going for a walk or doing some gardening, anything that will get you moving.      Hygiene   Maintain good hygiene (Get out of bed in the morning, Make your bed, Brush your teeth, Take a shower, and Get dressed like you were going to work, even if you are unemployed).  If your clothes don't fit try to get ones that do.    Diet  I will strive to eat foods that are good for me, drink plenty of water, and avoid excessive sugar, caffeine, alcohol, and other mood-altering substances.  Some foods that are helpful in depression are: complex carbohydrates, B vitamins, flaxseed, fish or fish oil, fresh fruits and vegetables.    Psychotherapy  I agree to participate in Individual Therapy (if recommended).    Medication  If prescribed medications, I agree to take them.  Missing doses can result in serious side effects.  I understand that  drinking alcohol, or other illicit drug use, may cause potential side effects.  I will not stop my medication abruptly without first discussing it with my provider.    Staying Connected With Others  I will stay in touch with my friends, family members, and my primary care provider/team.    Use your imagination  Be creative.  We all have a creative side; it doesn t matter if it s oil painting, sand castles, or mud pies! This will also kick up the endorphins.    Witness Beauty  (AKA stop and smell the roses) Take a look outside, even in mid-winter. Notice colors, textures. Watch the squirrels and birds.     Service to others  Be of service to others.  There is always someone else in need.  By helping others we can  get out of ourselves  and remember the really important things.  This also provides opportunities for practicing all the other parts of the program.    Humor  Laugh and be silly!  Adjust your TV habits for less news and crime-drama and more comedy.    Control your stress  Try breathing deep, massage therapy, biofeedback, and meditation. Find time to relax each day.     My support system    Clinic Contact:  Phone number:    Contact 1:  Phone number:    Contact 2:  Phone number:    Sabian[employer]/:  Phone number:    Therapist:  Phone number:    Local crisis center:    Phone number:    Other community support:  Phone number:

## 2018-07-02 NOTE — MR AVS SNAPSHOT
After Visit Summary   7/2/2018    Crystal Marcus    MRN: [id number]           Patient Information     Date Of Birth          1970        Visit Information        Provider Department      7/2/2018 2:00 PM Crystal Abarca PA-C [facility]        Today's Diagnoses     Moderate recurrent major depression (H)    -  1    LIDIA (generalized anxiety disorder)        Screening for diabetes mellitus        Lipid screening          Care Instructions    Please return fasting for cholesterol recheck, you can make a lab only appointment for this.  No food or drink other than water for 10 hours.      Recheck pristiq in 1 year, sooner if needed          Follow-ups after your visit        Future tests that were ordered for you today     Open Future Orders        Priority Expected Expires Ordered    Lipid panel reflex to direct LDL Fasting Routine  7/2/2019 7/2/2018    Comprehensive metabolic panel Routine  7/2/2019 7/2/2018            Who to contact     If you have questions or need follow up information about today's clinic visit or your schedule please contact [facility] directly at [phone].  Normal or non-critical lab and imaging results will be communicated to you by [email or web address], letter or phone within 4 business days after the clinic has received the results. If you do not hear from us within 7 days, please contact the clinic through [employer] or phone. If you have a critical or abnormal lab result, we will notify you by phone as soon as possible.  Submit refill requests through [employer] or call your pharmacy and they will forward the refill request to us. Please allow 3 business days for your refill to be completed.          Additional Information About Your Visit        [email or web address] Information     [employer] gives you secure access to your electronic health record. If you see a primary care provider, you can also send messages to your care team and make appointments. If you have  questions, please call your primary care clinic.  If you do not have a primary care provider, please call [phone] and they will assist you.        Care EveryWhere ID     This is your Care EveryWhere ID. This could be used by other organizations to access your [facility] medical records  [id number]        Your Vitals Were     Pulse Temperature Respirations Last Period Pulse Oximetry Breastfeeding?    79 97  F (36.1  C) (Oral) 16 12/07/2015 98% No    BMI (Body Mass Index)                   27.57 kg/m2            Blood Pressure from Last 3 Encounters:   07/02/18 121/76   05/21/18 121/80   03/26/18 118/77    Weight from Last 3 Encounters:   07/02/18 176 lb (79.8 kg)   05/21/18 175 lb 6.4 oz (79.6 kg)   03/26/18 172 lb (78 kg)                 Where to get your medicines      These medications were sent to [facility]/pharmacy #[id number] - [place] - [address] [place] [address] AT [address]SECTION [address] [place] [address]     Phone:  [phone]     desvenlafaxine succinate 50 MG 24 hr tablet          Primary Care Provider Office Phone # Fax #    Maryse Rosales [phone] [address] [facility]  [place] [address]        Equal Access to Services     [facility]: Hadii aad ku hadasho Soomaali, waaxda luqadaha, qaybta kaalmada adecharissada, stephan jordan. So [facility] [phone].    ATENCIÓN: Si habla español, tiene a haines disposición servicios gratuitos de asistencia lingüística. Llame al [phone].    We comply with applicable federal civil rights laws and [place] laws. We do not discriminate on the basis of race, color, national origin, age, disability, sex, sexual orientation, or gender identity.            Thank you!     Thank you for choosing [facility]  for your care. Our goal is always to provide you with excellent care. Hearing back from our patients is one way we can continue to improve our services. Please take a few minutes to  complete the written survey that you may receive in the mail after your visit with us. Thank you!             Your Updated Medication List - Protect others around you: Learn how to safely use, store and throw away your medicines at www.disposemymeds.org.          This list is accurate as of 7/2/18  2:21 PM.  Always use your most recent med list.                   Brand Name Dispense Instructions for use Diagnosis    desvenlafaxine succinate 50 MG 24 hr tablet    PRISTIQ    90 tablet    TAKE 1 TABLET (50 MG) BY MOUTH DAILY    Moderate recurrent major depression (H), LIDIA (generalized anxiety disorder)       ibuprofen 600 MG tablet    ADVIL/MOTRIN    60 tablet    Take 1 tablet (600 mg) by mouth every 6 hours as needed for pain (mild)    S/P hysterectomy       methocarbamol 500 MG tablet    ROBAXIN    30 tablet    Take 2 tablets (1,000 mg) by mouth 3 times daily as needed for muscle spasms    Lumbar sprain, initial encounter       multivitamin, therapeutic with minerals Tabs tablet      Take 1 tablet by mouth daily

## 2018-07-02 NOTE — PATIENT INSTRUCTIONS
Please return fasting for cholesterol recheck, you can make a lab only appointment for this.  No food or drink other than water for 10 hours.      Recheck pristiq in 1 year, sooner if needed

## 2018-07-02 NOTE — LETTER
[facility]  [address] [facility] [id number] [phone]        August 13, 2019    Crystal Marcus  [address] [facility] [address]              Dear Crystal Marcus    This is to remind you that your Fasting lab is due.    You may call our office at [phone] to schedule an appointment.    Please disregard this notice if you have already had your labs drawn or made an appointment.        Sincerely,        Crystal JUDGE

## 2018-07-02 NOTE — NURSING NOTE
"Chief Complaint   Patient presents with     Depression     med check     Anxiety     Health Maintenance     orders pended        Initial /76  Pulse 79  Temp 97  F (36.1  C) (Oral)  Resp 16  Wt 176 lb (79.8 kg)  LMP 12/07/2015  SpO2 98%  Breastfeeding? No  BMI 27.57 kg/m2 Estimated body mass index is 27.57 kg/(m^2) as calculated from the following:    Height as of 3/26/18: 5' 7\" (1.702 m).    Weight as of this encounter: 176 lb (79.8 kg).  Medication Reconciliation: complete      Ivette Steele MA    "

## 2018-07-03 ASSESSMENT — ANXIETY QUESTIONNAIRES: GAD7 TOTAL SCORE: 1

## 2018-07-03 ASSESSMENT — PATIENT HEALTH QUESTIONNAIRE - PHQ9: SUM OF ALL RESPONSES TO PHQ QUESTIONS 1-9: 1

## 2019-01-29 ENCOUNTER — OFFICE VISIT (OUTPATIENT)
Dept: FAMILY MEDICINE | Facility: CLINIC | Age: 49
End: 2019-01-29
Payer: COMMERCIAL

## 2019-01-29 VITALS
BODY MASS INDEX: 28.7 KG/M2 | RESPIRATION RATE: 20 BRPM | HEIGHT: 66 IN | OXYGEN SATURATION: 98 % | WEIGHT: 178.6 LBS | HEART RATE: 91 BPM | TEMPERATURE: 97.6 F | SYSTOLIC BLOOD PRESSURE: 122 MMHG | DIASTOLIC BLOOD PRESSURE: 78 MMHG

## 2019-01-29 DIAGNOSIS — J06.9 VIRAL UPPER RESPIRATORY TRACT INFECTION: Primary | ICD-10-CM

## 2019-01-29 PROCEDURE — 99213 OFFICE O/P EST LOW 20 MIN: CPT | Performed by: NURSE PRACTITIONER

## 2019-01-29 RX ORDER — FLUTICASONE PROPIONATE 50 MCG
1 SPRAY, SUSPENSION (ML) NASAL DAILY
Qty: 1 BOTTLE | Refills: 1 | Status: SHIPPED | OUTPATIENT
Start: 2019-01-29 | End: 2019-09-16

## 2019-01-29 ASSESSMENT — ANXIETY QUESTIONNAIRES
6. BECOMING EASILY ANNOYED OR IRRITABLE: NOT AT ALL
2. NOT BEING ABLE TO STOP OR CONTROL WORRYING: NOT AT ALL
5. BEING SO RESTLESS THAT IT IS HARD TO SIT STILL: SEVERAL DAYS
7. FEELING AFRAID AS IF SOMETHING AWFUL MIGHT HAPPEN: NOT AT ALL
IF YOU CHECKED OFF ANY PROBLEMS ON THIS QUESTIONNAIRE, HOW DIFFICULT HAVE THESE PROBLEMS MADE IT FOR YOU TO DO YOUR WORK, TAKE CARE OF THINGS AT HOME, OR GET ALONG WITH OTHER PEOPLE: NOT DIFFICULT AT ALL
1. FEELING NERVOUS, ANXIOUS, OR ON EDGE: NOT AT ALL
3. WORRYING TOO MUCH ABOUT DIFFERENT THINGS: NOT AT ALL
GAD7 TOTAL SCORE: 1

## 2019-01-29 ASSESSMENT — MIFFLIN-ST. JEOR: SCORE: 1467.25

## 2019-01-29 ASSESSMENT — PATIENT HEALTH QUESTIONNAIRE - PHQ9
SUM OF ALL RESPONSES TO PHQ QUESTIONS 1-9: 2
5. POOR APPETITE OR OVEREATING: NOT AT ALL

## 2019-01-29 NOTE — PROGRESS NOTES
SUBJECTIVE:  Crystal Marcus  is a 48 year old  female  who presents with the following concerns;    Symptom duration:  2 days   Sympom severity:  mod   Treatments tried:  nyquil   Contacts:  son had a cold last week                 Symptoms: Present Comment   Fever/Chills     Fatigue x    Muscle Aches x    Eye Irritation     Sneezing     Nasal Delbert/Drg x    Sinus Pressure/Pain x    Loss of smell x    Dental pain     Sore Throat     Swollen Glands     Ear Pain/Fullness     Cough     Wheeze     Chest Pain     Shortness of breath x    Rash     Other       Medications updated and reviewed.  Past, family and surgical history is updated and reviewed in the record.  Patient Active Problem List    Diagnosis Date Noted     Ureterocele, acquired 01/23/2015     Priority: Medium     right, associated right hydronephrosis         Vitamin D deficiency 12/01/2014     Priority: Medium     Problem list name updated by automated process. Provider to review       Hot flashes 01/10/2013     Priority: Medium     CARDIOVASCULAR SCREENING; LDL GOAL LESS THAN 160 02/02/2012     Priority: Medium     FH: breast cancer in first degree relative 08/30/2011     Priority: Medium     Past Medical History:   Diagnosis Date     Arthritis 1/1/2006    X-rays show arthritis in my hips     Complex ovarian cyst 1/15/2015     Depressive disorder      Depressive disorder, not elsewhere classified      FH: breast cancer in first degree relative 8/30/2011     FH: breast cancer in first degree relative      Hydronephrosis 1/23/2015      Family History   Problem Relation Age of Onset     Breast Cancer Mother      Lipids Mother      Hypertension Mother      Breast Cancer Maternal Grandmother      Hypertension Father      Seizure Disorder Brother      ROS:  Other than noted above, general, HEENT, respiratory, cardiac and gastrointestinal systems are negative.    OBJECTIVE:  GENERAL:  Alert, no acute distress  EYES:  PERRL, EOM normal, conjunctiva and lids  normal  HEENT:  Ears and TMs normal, oral mucosa and posterior oropharynx normal POSITIVE nasal mucosa edematous, erythematous, clear PND present.   RESP:  Lungs clear to auscultation.  CV:  Normal rate, regular rhythm, no murmur or gallop.  LYMPHATICS:  No cervical, supraclavicular adenopathy  SKIN:  No suspicious rashes.    Assessment/Plan:     ICD-10-CM    1. Viral upper respiratory tract infection J06.9 fluticasone (FLONASE) 50 MCG/ACT nasal spray        See patient instructions: Flonase and ibuprofen with food as discussed.  If your symptoms worsen, let me know and I will send in antibiotics for you.     Tanvi Sherwood, GUALEBRTO, FNP-BC

## 2019-01-30 ASSESSMENT — ANXIETY QUESTIONNAIRES: GAD7 TOTAL SCORE: 1

## 2019-01-30 NOTE — PATIENT INSTRUCTIONS
Flonase and ibuprofen with food as discussed.  If your symptoms worsen, let me know and I will send in antibiotics for you.   Patient Education     Viral Upper Respiratory Illness (Adult)  You have a viral upper respiratory illness (URI), which is another term for the common cold. This illness is contagious during the first few days. It is spread through the air by coughing and sneezing. It may also be spread by direct contact (touching the sick person and then touching your own eyes, nose, or mouth). Frequent handwashing will decrease risk of spread. Most viral illnesses go away within 7 to 10 days with rest and simple home remedies. Sometimes the illness may last for several weeks. Antibiotics will not kill a virus, and they are generally not prescribed for this condition.    Home care    If symptoms are severe, rest at home for the first 2 to 3 days. When you resume activity, don't let yourself get too tired.    Don't smoke. If you need help stopping, talk with your healthcare provider.    Avoid being exposed to cigarette smoke (yours or others ).    You may use acetaminophen or ibuprofen to control pain and fever, unless another medicine was prescribed. If you have chronic liver or kidney disease, have ever had a stomach ulcer or gastrointestinal bleeding, or are taking blood-thinning medicines, talk with your healthcare provider before using these medicines. Aspirin should never be given to anyone under 18 years of age who is ill with a viral infection or fever. It may cause severe liver or brain damage.    Your appetite may be poor, so a light diet is fine. Stay well hydrated by drinking 6 to 8 glasses of fluids per day (water, soft drinks, juices, tea, or soup). Extra fluids will help loosen secretions in the nose and lungs.    Over-the-counter cold medicines will not shorten the length of time you re sick, but they may be helpful for the following symptoms: cough, sore throat, and nasal and sinus congestion.  If you take prescription medicines, ask your healthcare provider or pharmacist which over-the-counter medicines are safe to use. (Note: Don't use decongestants if you have high blood pressure.)  Follow-up care  Follow up with your healthcare provider, or as advised.  When to seek medical advice  Call your healthcare provider right away if any of these occur:    Cough with lots of colored sputum (mucus)    Severe headache; face, neck, or ear pain    Difficulty swallowing due to throat pain    Fever of 100.4 F (38 C) or higher, or as directed by your healthcare provider  Call 911  Call 911 if any of these occur:    Chest pain, shortness of breath, wheezing, or difficulty breathing    Coughing up blood    Very severe pain with swallowing, especially if it goes along with a muffled voice   Date Last Reviewed: 6/1/2018 2000-2018 The [employer] website. [address] [place] [address]. All rights reserved. This information is not intended as a substitute for professional medical care. Always follow your healthcare professional's instructions.

## 2019-03-09 ENCOUNTER — HEALTH MAINTENANCE LETTER (OUTPATIENT)
Age: 49
End: 2019-03-09

## 2019-07-08 ENCOUNTER — MYC MEDICAL ADVICE (OUTPATIENT)
Dept: FAMILY MEDICINE | Facility: CLINIC | Age: 49
End: 2019-07-08

## 2019-07-10 DIAGNOSIS — F33.1 MODERATE RECURRENT MAJOR DEPRESSION (H): ICD-10-CM

## 2019-07-10 DIAGNOSIS — F41.1 GAD (GENERALIZED ANXIETY DISORDER): ICD-10-CM

## 2019-07-11 RX ORDER — DESVENLAFAXINE 50 MG/1
TABLET, FILM COATED, EXTENDED RELEASE ORAL
Qty: 90 TABLET | Refills: 3 | OUTPATIENT
Start: 2019-07-11

## 2019-07-11 NOTE — TELEPHONE ENCOUNTER
Requested Prescriptions   Pending Prescriptions Disp Refills     desvenlafaxine (PRISTIQ) 50 MG 24 hr tablet [Pharmacy Med Name: DESVENLAFAXINE SUC ER 50 MG TB] 90 tablet 3     Sig: TAKE 1 TABLET BY MOUTH EVERY DAY       Serotonin-Norepinephrine Reuptake Inhibitors  Failed - 7/10/2019  1:35 AM        Failed - Normal serum creatinine on file in past 12 months     Recent Labs   Lab Test 01/11/16  1552   CR 0.75

## 2019-07-12 NOTE — TELEPHONE ENCOUNTER
Called and spoke to patient, appointment made, she does have enough medication for a while.Rowan Villa MA/TC

## 2019-07-16 ENCOUNTER — TELEPHONE (OUTPATIENT)
Dept: FAMILY MEDICINE | Facility: CLINIC | Age: 49
End: 2019-07-16

## 2019-07-16 DIAGNOSIS — F33.1 MODERATE RECURRENT MAJOR DEPRESSION (H): ICD-10-CM

## 2019-07-16 DIAGNOSIS — F41.1 GAD (GENERALIZED ANXIETY DISORDER): ICD-10-CM

## 2019-07-16 RX ORDER — DESVENLAFAXINE 50 MG/1
TABLET, FILM COATED, EXTENDED RELEASE ORAL
Qty: 30 TABLET | Refills: 1 | Status: SHIPPED | OUTPATIENT
Start: 2019-07-16 | End: 2019-08-29

## 2019-07-16 NOTE — TELEPHONE ENCOUNTER
Patient has appointment 8/27  Prescription approved per [facility] Refill Protocol.      Tanvi DILLARDN, RN, CPN

## 2019-07-16 NOTE — TELEPHONE ENCOUNTER
Patient is calling to request refill until can be seen with provider again, for RX: desvenlafaxine succinate (PRISTIQ) 50 MG 24 hr tablet. Please call to advise. Thank you.

## 2019-08-29 ENCOUNTER — MYC REFILL (OUTPATIENT)
Dept: FAMILY MEDICINE | Facility: CLINIC | Age: 49
End: 2019-08-29

## 2019-08-29 DIAGNOSIS — F41.1 GAD (GENERALIZED ANXIETY DISORDER): ICD-10-CM

## 2019-08-29 DIAGNOSIS — F33.1 MODERATE RECURRENT MAJOR DEPRESSION (H): ICD-10-CM

## 2019-08-29 RX ORDER — DESVENLAFAXINE 50 MG/1
TABLET, FILM COATED, EXTENDED RELEASE ORAL
Qty: 30 TABLET | Refills: 0 | Status: SHIPPED | OUTPATIENT
Start: 2019-08-29 | End: 2019-09-16

## 2019-09-16 ENCOUNTER — OFFICE VISIT (OUTPATIENT)
Dept: FAMILY MEDICINE | Facility: CLINIC | Age: 49
End: 2019-09-16
Payer: COMMERCIAL

## 2019-09-16 VITALS
TEMPERATURE: 97.9 F | HEIGHT: 67 IN | WEIGHT: 168 LBS | SYSTOLIC BLOOD PRESSURE: 115 MMHG | RESPIRATION RATE: 16 BRPM | DIASTOLIC BLOOD PRESSURE: 71 MMHG | HEART RATE: 76 BPM | BODY MASS INDEX: 26.37 KG/M2 | OXYGEN SATURATION: 96 %

## 2019-09-16 DIAGNOSIS — F33.1 MODERATE RECURRENT MAJOR DEPRESSION (H): Primary | ICD-10-CM

## 2019-09-16 DIAGNOSIS — E78.5 HYPERLIPIDEMIA, UNSPECIFIED HYPERLIPIDEMIA TYPE: ICD-10-CM

## 2019-09-16 DIAGNOSIS — Z23 NEED FOR PROPHYLACTIC VACCINATION AND INOCULATION AGAINST INFLUENZA: ICD-10-CM

## 2019-09-16 DIAGNOSIS — F41.1 GAD (GENERALIZED ANXIETY DISORDER): ICD-10-CM

## 2019-09-16 PROCEDURE — 90686 IIV4 VACC NO PRSV 0.5 ML IM: CPT | Performed by: PHYSICIAN ASSISTANT

## 2019-09-16 PROCEDURE — 99213 OFFICE O/P EST LOW 20 MIN: CPT | Mod: 25 | Performed by: PHYSICIAN ASSISTANT

## 2019-09-16 PROCEDURE — 90471 IMMUNIZATION ADMIN: CPT | Performed by: PHYSICIAN ASSISTANT

## 2019-09-16 RX ORDER — DESVENLAFAXINE 50 MG/1
TABLET, FILM COATED, EXTENDED RELEASE ORAL
Qty: 90 TABLET | Refills: 1 | Status: SHIPPED | OUTPATIENT
Start: 2019-09-16 | End: 2020-02-27

## 2019-09-16 ASSESSMENT — PATIENT HEALTH QUESTIONNAIRE - PHQ9
SUM OF ALL RESPONSES TO PHQ QUESTIONS 1-9: 0
5. POOR APPETITE OR OVEREATING: NOT AT ALL

## 2019-09-16 ASSESSMENT — ANXIETY QUESTIONNAIRES
6. BECOMING EASILY ANNOYED OR IRRITABLE: NOT AT ALL
GAD7 TOTAL SCORE: 1
7. FEELING AFRAID AS IF SOMETHING AWFUL MIGHT HAPPEN: NOT AT ALL
3. WORRYING TOO MUCH ABOUT DIFFERENT THINGS: NOT AT ALL
5. BEING SO RESTLESS THAT IT IS HARD TO SIT STILL: NOT AT ALL
IF YOU CHECKED OFF ANY PROBLEMS ON THIS QUESTIONNAIRE, HOW DIFFICULT HAVE THESE PROBLEMS MADE IT FOR YOU TO DO YOUR WORK, TAKE CARE OF THINGS AT HOME, OR GET ALONG WITH OTHER PEOPLE: NOT DIFFICULT AT ALL
2. NOT BEING ABLE TO STOP OR CONTROL WORRYING: NOT AT ALL
1. FEELING NERVOUS, ANXIOUS, OR ON EDGE: SEVERAL DAYS

## 2019-09-16 ASSESSMENT — MIFFLIN-ST. JEOR: SCORE: 1429.67

## 2019-09-16 NOTE — PROGRESS NOTES
Subjective     Crystal Marcus is a 48 year old female who presents to clinic today for the following health issues:    She was unable to get an appointment with Crystal Abarca in time to get her medication refilled.   She also got a letter to come in for fasting cholesterol screening.     HPI     Depression and Anxiety Follow-Up    How are you doing with your depression since your last visit? Stable    How are you doing with your anxiety since your last visit?  Stable    Are you having other symptoms that might be associated with depression or anxiety? No    Have you had a significant life event? No     Do you have any concerns with your use of alcohol or other drugs? No    Social History     Tobacco Use     Smoking status: Former Smoker     Packs/day: 0.20     Years: 12.00     Pack years: 2.40     Types: Cigarettes     Start date: 1988     Last attempt to quit: 2000     Years since quittin.0     Smokeless tobacco: Never Used   Substance Use Topics     Alcohol use: Yes     Comment: occ     Drug use: No     PHQ 3/26/2018 2018 2019   PHQ-9 Total Score 3 1 2   Q9: Thoughts of better off dead/self-harm past 2 weeks Not at all Not at all Not at all     LIDIA-7 SCORE 3/26/2018 2018 2019   Total Score - - -   Total Score 0 1 1       Suicide Assessment Five-step Evaluation and Treatment (SAFE-T)      How many servings of fruits and vegetables do you eat daily?  4 or more    On average, how many sweetened beverages do you drink each day (soda, juice, sweet tea, etc)?   0    How many days per week do you miss taking your medication? 0          Patient Active Problem List   Diagnosis     FH: breast cancer in first degree relative     CARDIOVASCULAR SCREENING; LDL GOAL LESS THAN 160     Hot flashes     Vitamin D deficiency     Ureterocele, acquired     Past Surgical History:   Procedure Laterality Date     BIOPSY  ,      DAVINCI HYSTERECTOMY TOTAL, SALPINGECTOMY BILATERAL Bilateral 2015  "   Procedure: DAVINCI HYSTERECTOMY TOTAL, SALPINGECTOMY BILATERAL;  Surgeon: Dary Bloom MD;  Location: [facility]     HYSTERECTOMY, PAP NO LONGER INDICATED       SURGICAL HISTORY OF -       Cryotherapy     wisdom teeth removal         Social History     Tobacco Use     Smoking status: Former Smoker     Packs/day: 0.20     Years: 12.00     Pack years: 2.40     Types: Cigarettes     Start date: 1988     Last attempt to quit: 2000     Years since quittin.0     Smokeless tobacco: Never Used   Substance Use Topics     Alcohol use: Yes     Comment: occ     Family History   Problem Relation Age of Onset     Breast Cancer Mother      Lipids Mother      Hypertension Mother      Breast Cancer Maternal Grandmother      Hypertension Father      Seizure Disorder Brother          Current Outpatient Medications   Medication Sig Dispense Refill     desvenlafaxine (PRISTIQ) 50 MG 24 hr tablet TAKE 1 TABLET (50 MG) BY MOUTH DAILY 90 tablet 1     multivitamin, therapeutic with minerals (THERA-VIT-M) TABS Take 1 tablet by mouth daily       Allergies   Allergen Reactions     Sulfa Drugs Rash     SULFA     BP Readings from Last 3 Encounters:   19 115/71   19 122/78   18 121/76    Wt Readings from Last 3 Encounters:   19 76.2 kg (168 lb)   19 81 kg (178 lb 9.6 oz)   18 79.8 kg (176 lb)                    Reviewed and updated as needed this visit by Provider         Review of Systems   ROS COMP: Constitutional, HEENT, cardiovascular, pulmonary, gi and gu systems are negative, except as otherwise noted.      Objective    /71   Pulse 76   Temp 97.9  F (36.6  C) (Oral)   Resp 16   Ht 1.702 m (5' 7\")   Wt 76.2 kg (168 lb)   LMP 2015   SpO2 96%   Breastfeeding? No   BMI 26.31 kg/m    Body mass index is 26.31 kg/m .  Physical Exam   GENERAL: healthy, alert and no distress  PSYCH: mentation appears normal, affect normal/bright, judgement and insight intact and appearance " well groomed    Diagnostic Test Results:  Labs reviewed in Epic        Assessment & Plan     1. Moderate recurrent major depression (H)  2. LIDIA (generalized anxiety disorder)  Stable, refills given x 6 months  Plan follow up with her primary provider at that time.   - desvenlafaxine (PRISTIQ) 50 MG 24 hr tablet; TAKE 1 TABLET (50 MG) BY MOUTH DAILY  Dispense: 90 tablet; Refill: 1    3. Hyperlipidemia, unspecified hyperlipidemia type  She was scheduled for fasting tests.   - Lipid panel reflex to direct LDL Fasting; Future    4. Need for prophylactic vaccination and inoculation against influenza  - INFLUENZA VACCINE IM > 6 MONTHS VALENT IIV4 [68168]  - Vaccine Administration, Initial [77970]       Return in about 6 months (around 3/16/2020) for depression / anxiety, with primary provider.    Kristen M. Kehr, PA-C  [facility]

## 2019-09-17 ASSESSMENT — ANXIETY QUESTIONNAIRES: GAD7 TOTAL SCORE: 1

## 2019-09-30 PROBLEM — E78.49 OTHER HYPERLIPIDEMIA: Status: ACTIVE | Noted: 2019-09-30

## 2019-09-30 PROBLEM — F41.9 ANXIETY: Status: ACTIVE | Noted: 2019-09-30

## 2019-11-03 ENCOUNTER — HEALTH MAINTENANCE LETTER (OUTPATIENT)
Age: 49
End: 2019-11-03

## 2019-12-15 ENCOUNTER — HOSPITAL ENCOUNTER (EMERGENCY)
Facility: CLINIC | Age: 49
Discharge: HOME | End: 2019-12-15
Attending: EMERGENCY MEDICINE | Admitting: EMERGENCY MEDICINE
Payer: COMMERCIAL

## 2019-12-15 VITALS
DIASTOLIC BLOOD PRESSURE: 87 MMHG | HEART RATE: 68 BPM | RESPIRATION RATE: 16 BRPM | SYSTOLIC BLOOD PRESSURE: 130 MMHG | WEIGHT: 160 LBS | BODY MASS INDEX: 25.11 KG/M2 | TEMPERATURE: 98.1 F | HEIGHT: 67 IN | OXYGEN SATURATION: 98 %

## 2019-12-15 DIAGNOSIS — S01.85XA DOG BITE OF FACE, INITIAL ENCOUNTER: ICD-10-CM
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
## 2019-12-15 PROCEDURE — 99283 EMERGENCY DEPT VISIT LOW MDM: CPT | Performed by: EMERGENCY MEDICINE

## 2019-12-15 PROCEDURE — 25000132 ZZH RX MED GY IP 250 OP 250 PS 637: Performed by: EMERGENCY MEDICINE

## 2019-12-15 PROCEDURE — 99284 EMERGENCY DEPT VISIT MOD MDM: CPT | Mod: Z6 | Performed by: EMERGENCY MEDICINE

## 2019-12-15 RX ADMIN — AMOXICILLIN AND CLAVULANATE POTASSIUM 1 TABLET: 875; 125 TABLET, FILM COATED ORAL at 23:14

## 2019-12-15 ASSESSMENT — ENCOUNTER SYMPTOMS
PSYCHIATRIC NEGATIVE: 1
NEUROLOGICAL NEGATIVE: 1
CONSTITUTIONAL NEGATIVE: 1
WOUND: 1

## 2019-12-15 ASSESSMENT — MIFFLIN-ST. JEOR: SCORE: 1393.39

## 2019-12-15 NOTE — ED AVS SNAPSHOT
[facility]  [address] [address]  Phone:  [phone]  Fax:  [phone]                                    Crystal Marcus   MRN: [id number]    Department:  [facility]   Date of Visit:  12/15/2019           After Visit Summary Signature Page    I have received my discharge instructions, and my questions have been answered. I have discussed any challenges I see with this plan with the nurse or doctor.    ..........................................................................................................................................  Patient/Patient Representative Signature      ..........................................................................................................................................  Patient Representative Print Name and Relationship to Patient    ..................................................               ................................................  Date                                   Time    ..........................................................................................................................................  Reviewed by Signature/Title    ...................................................              ..............................................  Date                                               Time          22EPIC Rev 08/18

## 2019-12-16 NOTE — ED NOTES
Pt bit by own dog on right cheek, pt has small laceration below eye and 2 puncture marks below, bleeding controlled. Dog up to date on shots.

## 2019-12-16 NOTE — ED TRIAGE NOTES
Pt moved her dog in bed and he stuck his head out and bit her in the face below R eye, no vision problems. Dog has had all his shots.

## 2019-12-16 NOTE — ED PROVIDER NOTES
History     Chief Complaint   Patient presents with     Dog Bite     HPI  Crystal Marcus is a 48 year old female who presents to the emergency department with concerns regarding dog bite to the right face.  Patient states that she was attempting to go to bed.  Dog normally sleeps in the bed, however got startled, and subsequently bit the patient in the right side of her face.  This occurred just below the right eye.  Has superficial abrasion, with a couple puncture wounds inferior to the right orbit.  Denies any visual changes.  No difficulty moving the eyes, with no pain with eye movement.  No blurred vision.  No conjunctival injection/redness.  Patient has immunizations which are up-to-date.  Dog's vaccinations are up-to-date.  No active bleeding upon arrival.    Allergies:  Allergies   Allergen Reactions     Sulfa Drugs Rash     SULFA       Problem List:    Patient Active Problem List    Diagnosis Date Noted     Other hyperlipidemia 09/30/2019     Priority: Medium     Anxiety 09/30/2019     Priority: Medium     Ureterocele, acquired 01/23/2015     Priority: Medium     right, associated right hydronephrosis         Vitamin D deficiency 12/01/2014     Priority: Medium     Problem list name updated by automated process. Provider to review       Hot flashes 01/10/2013     Priority: Medium     CARDIOVASCULAR SCREENING; LDL GOAL LESS THAN 160 02/02/2012     Priority: Medium     FH: breast cancer in first degree relative 08/30/2011     Priority: Medium        Past Medical History:    Past Medical History:   Diagnosis Date     Arthritis 1/1/2006     Complex ovarian cyst 1/15/2015     Depressive disorder      Depressive disorder, not elsewhere classified      FH: breast cancer in first degree relative 8/30/2011     FH: breast cancer in first degree relative      Hydronephrosis 1/23/2015       Past Surgical History:    Past Surgical History:   Procedure Laterality Date     BIOPSY  1995, 2015     DAVINCI HYSTERECTOMY  "TOTAL, SALPINGECTOMY BILATERAL Bilateral 2015    Procedure: DAVINCI HYSTERECTOMY TOTAL, SALPINGECTOMY BILATERAL;  Surgeon: Dary Bloom MD;  Location: [facility]     HYSTERECTOMY, PAP NO LONGER INDICATED       SURGICAL HISTORY OF -       Cryotherapy     wisdom teeth removal         Family History:    Family History   Problem Relation Age of Onset     Breast Cancer Mother      Lipids Mother      Hypertension Mother      Breast Cancer Maternal Grandmother      Hypertension Father      Seizure Disorder Brother        Social History:  Marital Status:   [2]  Social History     Tobacco Use     Smoking status: Former Smoker     Packs/day: 0.20     Years: 12.00     Pack years: 2.40     Types: Cigarettes     Start date: 1988     Last attempt to quit: 2000     Years since quittin.2     Smokeless tobacco: Never Used   Substance Use Topics     Alcohol use: Yes     Comment: occ     Drug use: No        Medications:    amoxicillin-clavulanate (AUGMENTIN) 875-125 MG tablet  desvenlafaxine (PRISTIQ) 50 MG 24 hr tablet  multivitamin, therapeutic with minerals (THERA-VIT-M) TABS          Review of Systems   Constitutional: Negative.    Skin: Positive for wound.   Neurological: Negative.    Psychiatric/Behavioral: Negative.        Physical Exam   BP: 130/87  Pulse: 68  Temp: 98.1  F (36.7  C)  Resp: 16  Height: 170.2 cm (5' 7\")  Weight: 72.6 kg (160 lb)  SpO2: 98 %      Physical Exam  /87   Pulse 68   Temp 98.1  F (36.7  C) (Oral)   Resp 16   Ht 1.702 m (5' 7\")   Wt 72.6 kg (160 lb)   LMP 2015   SpO2 98%   BMI 25.06 kg/m    General: alert and in no acute distress  Head: right facial puncture wounds.    Abd: Soft, nontender, nondistended, no peritoneal signs  Musculoskel/Extremities: normal extremities, no edema, erythema, tenderness and full AROM of major joints without tenderness  Skin: no rashes, no diaphoresis and skin color normal  Neuro: Patient awake, alert, oriented, speech is " fluent, gait is normal  Psychiatric: affect/mood normal, cooperative, normal judgement/insight and memory intact      ED Course        Procedures               Critical Care time:  none               No results found for this or any previous visit (from the past 24 hour(s)).    Medications   amoxicillin-clavulanate (AUGMENTIN) 875-125 MG per tablet 1 tablet (has no administration in time range)       Assessments & Plan (with Medical Decision Making)  48 year old female, presenting to the emergency department with concerns regarding dog bite to the right face.  Patient's immunizations are up-to-date.  No active bleeding.  No lacerations which require suture repair.  There was no injury to the eye, or orbit.  Normal extraocular movements.  Normal eye exam.  The abrasion is just inferior to the right eye, measuring approximately 2 cm, with no gaping laceration which requires suture repair, and regardless would be hesitant to repair dog bite.  Given the facial location, patient will be started on Augmentin.  Encouraged to be seen if any signs of infection develop.  Patient's immunizations are also up-to-date.     I have reviewed the nursing notes.    I have reviewed the findings, diagnosis, plan and need for follow up with the patient.       New Prescriptions    AMOXICILLIN-CLAVULANATE (AUGMENTIN) 875-125 MG TABLET    Take 1 tablet by mouth 2 times daily for 10 days       Final diagnoses:   Dog bite of face, initial encounter       12/15/2019   [facility] EMERGENCY DEPARTMENT     Zhao Dee MD  12/15/19 [id number]

## 2020-02-18 DIAGNOSIS — F41.1 GAD (GENERALIZED ANXIETY DISORDER): ICD-10-CM

## 2020-02-18 DIAGNOSIS — F33.1 MODERATE RECURRENT MAJOR DEPRESSION (H): ICD-10-CM

## 2020-02-19 RX ORDER — DESVENLAFAXINE 50 MG/1
TABLET, FILM COATED, EXTENDED RELEASE ORAL
Qty: 90 TABLET | Refills: 1 | OUTPATIENT
Start: 2020-02-19

## 2020-02-19 NOTE — TELEPHONE ENCOUNTER
Creatinine   Date Value Ref Range Status   01/11/2016 0.75 0.52 - 1.04 mg/dL Final     Routing refill request to provider for review/approval because:  Labs not current:  As above  Mahsa Mata RN

## 2020-02-27 ENCOUNTER — TELEPHONE (OUTPATIENT)
Dept: FAMILY MEDICINE | Facility: CLINIC | Age: 50
End: 2020-02-27

## 2020-02-27 DIAGNOSIS — F33.1 MODERATE RECURRENT MAJOR DEPRESSION (H): ICD-10-CM

## 2020-02-27 DIAGNOSIS — F41.1 GAD (GENERALIZED ANXIETY DISORDER): ICD-10-CM

## 2020-02-27 RX ORDER — DESVENLAFAXINE 50 MG/1
TABLET, FILM COATED, EXTENDED RELEASE ORAL
Qty: 90 TABLET | Refills: 0 | Status: SHIPPED | OUTPATIENT
Start: 2020-02-27 | End: 2020-06-01

## 2020-02-27 NOTE — TELEPHONE ENCOUNTER
Reason for call:  Medication   If this is a refill request, has the caller requested the refill from the pharmacy already? N/A  Will the patient be using a [facility]? N/A  Name of the pharmacy and phone number for the current request: on file     Name of the medication requested: desvenlafaxine (PRISTIQ) 50 MG 24 hr tablet    Other request: Down to the last 2 tablets would like a refill before their appt if possible    Phone number to reach patient:  Cell number on file:    Telephone Information:   Mobile [phone]       Best Time:  any    Can we leave a detailed message on this number?  YES    Travel screening: Not Applicable

## 2020-02-27 NOTE — TELEPHONE ENCOUNTER
Next 5 appointments (look out 90 days)    Mar 12, 2020 10:00 AM CDT  Office Visit with Crystal Abarca PA-C  [facility] ([facility]) [address] [address] [phone]        Rx refilled per [facility] refill protocol.    La Wetzel BSN, RN

## 2020-03-03 NOTE — PROGRESS NOTES
Subjective     Crystal Marcus is a 49 year old female who presents to clinic today for the following health issues:    HPI     MAMMO is due per pt, pt will konrad appt today.    1)  CONGESTION  SX per pt x 1 month now, wondering if she can have it looked at. No runny nose or fever. Mild sore throat but not anymore. At night she coughs. Nonproductive. Taking mucinex. Seasonal allergies usually dont' affect her chest.  Has had bronchitis in the past. No history of pneumonia. Has never needed an inhaler. Oxygen is 96 percent.   No sinus pressure no congested nose. No blood in sputum.       2)  Depression and Anxiety Follow-Up    How are you doing with your depression since your last visit? Stable    How are you doing with your anxiety since your last visit?  Stable    Are you having other symptoms that might be associated with depression or anxiety? Yes:  Per pt more fatigue?    Have you had a significant life event? No     Do you have any concerns with your use of alcohol or other drugs? No      On pristiq. Denies suicidal or homicidal thoughts.  Patient instructed to go to the emergency room or call 911 if these occur. Has been more fatigued for a few months. Has been on the medication for years.   H/o low vitamin d. Not taking any. Will start. She felt MUCH better the day it was 62 degrees and warm. This is likely related also and I would expect to improve soon. If not we should do more thorough workup including labs and sleep study.             Social History     Tobacco Use     Smoking status: Former Smoker     Packs/day: 0.20     Years: 12.00     Pack years: 2.40     Types: Cigarettes     Start date: 1988     Last attempt to quit: 2000     Years since quittin.5     Smokeless tobacco: Never Used   Substance Use Topics     Alcohol use: Yes     Comment: occ     Drug use: No     PHQ 2018   PHQ-9 Total Score 1 2 0   Q9: Thoughts of better off dead/self-harm past 2 weeks Not at all Not  at all Not at all     LIDIA-7 SCORE 7/2/2018 1/29/2019 9/16/2019   Total Score - - -   Total Score 1 1 1     Last PHQ-9 3/12/2020   1.  Little interest or pleasure in doing things 1   2.  Feeling down, depressed, or hopeless 0   3.  Trouble falling or staying asleep, or sleeping too much 0   4.  Feeling tired or having little energy 1   5.  Poor appetite or overeating 0   6.  Feeling bad about yourself 0   7.  Trouble concentrating 0   8.  Moving slowly or restless 0   Q9: Thoughts of better off dead/self-harm past 2 weeks 0   PHQ-9 Total Score 2   Difficulty at work, home, or with people Somewhat difficult     LIDIA-7  3/12/2020   1. Feeling nervous, anxious, or on edge 0   2. Not being able to stop or control worrying 0   3. Worrying too much about different things 0   4. Trouble relaxing 0   5. Being so restless that it is hard to sit still 0   6. Becoming easily annoyed or irritable 0   7. Feeling afraid, as if something awful might happen 0   LIDIA-7 Total Score 0   If you checked any problems, how difficult have they made it for you to do your work, take care of things at home, or get along with other people? Not difficult at all       Suicide Assessment Five-step Evaluation and Treatment (SAFE-T)      How many servings of fruits and vegetables do you eat daily?  2-3    On average, how many sweetened beverages do you drink each day (Examples: soda, juice, sweet tea, etc.  Do NOT count diet or artificially sweetened beverages)?   0    How many days per week do you miss taking your medication? 0        Patient Active Problem List   Diagnosis     FH: breast cancer in first degree relative     CARDIOVASCULAR SCREENING; LDL GOAL LESS THAN 160     Hot flashes     Vitamin D deficiency     Ureterocele, acquired     Other hyperlipidemia     Anxiety     Past Surgical History:   Procedure Laterality Date     BIOPSY  1995, 2015     DAVINCI HYSTERECTOMY TOTAL, SALPINGECTOMY BILATERAL Bilateral 12/11/2015    Procedure: DAVINCI  "HYSTERECTOMY TOTAL, SALPINGECTOMY BILATERAL;  Surgeon: Dary Bloom MD;  Location: [facility] OR     HYSTERECTOMY, PAP NO LONGER INDICATED       SURGICAL HISTORY OF -       Cryotherapy     wisdom teeth removal         Social History     Tobacco Use     Smoking status: Former Smoker     Packs/day: 0.20     Years: 12.00     Pack years: 2.40     Types: Cigarettes     Start date: 1988     Last attempt to quit: 2000     Years since quittin.5     Smokeless tobacco: Never Used   Substance Use Topics     Alcohol use: Yes     Comment: occ     Family History   Problem Relation Age of Onset     Breast Cancer Mother      Lipids Mother      Hypertension Mother      Breast Cancer Maternal Grandmother      Hypertension Father      Seizure Disorder Brother          Current Outpatient Medications   Medication Sig Dispense Refill     azithromycin (ZITHROMAX) 250 MG tablet Take 2 tablets (500 mg) by mouth daily for 1 day, THEN 1 tablet (250 mg) daily for 4 days. 6 tablet 0     desvenlafaxine (PRISTIQ) 50 MG 24 hr tablet TAKE 1 TABLET (50 MG) BY MOUTH DAILY 90 tablet 0     multivitamin, therapeutic with minerals (THERA-VIT-M) TABS Take 1 tablet by mouth daily       Allergies   Allergen Reactions     Sulfa Drugs Rash     SULFA         Reviewed and updated as needed this visit by Provider         Review of Systems   ROS COMP: Constitutional, HEENT, cardiovascular, pulmonary, GI, , musculoskeletal, neuro, skin, endocrine and psych systems are negative, except as otherwise noted.      Objective    /80   Pulse 78   Temp 98.7  F (37.1  C) (Tympanic)   Resp 16   Ht 1.702 m (5' 7\")   Wt 77.1 kg (170 lb)   LMP 2015   SpO2 96%   Breastfeeding No   BMI 26.63 kg/m    Body mass index is 26.63 kg/m .  Physical Exam   GENERAL:  No acute distress.  Interacts appropriately.  Breathing without difficulty.  Alert.  HEENT:  Tympanic membranes intact without effusion or erythema.  Oral mucosa moist. Posterior pharynx " has no erythema.  Posterior pharynx has no exudate. no edema.  NECK:  Soft and supple.  without tenderness.  no lymphadenopathy.  Normal range of motion.    CARDIAC:   Regular rate and rhythm.  No murmurs, rubs, or gallops.   PULMONARY: very mild expiratory wheezing throughout no crackles.   Normal air exchange/breath sounds.  No use of accessory muscles.    PSYCH: Normal affect.  SKIN: No rashes.            Assessment & Plan     1. Mood disorder (H)  Stable recheck 1 year    2. Encounter for screening mammogram for breast cancer    - MA SCREENING DIGITAL BILAT - Future  (s+30); Future    3. Anxiety  stable    4. Bronchitis  Likely viral, is improving per patient, see below she will only fill antibiotic if needed  - azithromycin (ZITHROMAX) 250 MG tablet; Take 2 tablets (500 mg) by mouth daily for 1 day, THEN 1 tablet (250 mg) daily for 4 days.  Dispense: 6 tablet; Refill: 0     Patient Instructions   Take 5,000 units d3 over the counter daily   Follow up if energy does not improve    Recheck medications 1 year  Recheck physical as soon as possible    Take antibiotic if symptoms do not improve or worsen  We can also consider adding inhaled steroid like flovent in future if needed          Return in about 4 weeks (around 4/9/2020) for Physical Exam, fasting Lab Work.    Crystal Abarca PA-C  [facility]

## 2020-03-12 ENCOUNTER — OFFICE VISIT (OUTPATIENT)
Dept: FAMILY MEDICINE | Facility: CLINIC | Age: 50
End: 2020-03-12
Payer: COMMERCIAL

## 2020-03-12 VITALS
TEMPERATURE: 98.7 F | BODY MASS INDEX: 26.68 KG/M2 | DIASTOLIC BLOOD PRESSURE: 80 MMHG | SYSTOLIC BLOOD PRESSURE: 114 MMHG | OXYGEN SATURATION: 96 % | RESPIRATION RATE: 16 BRPM | WEIGHT: 170 LBS | HEART RATE: 78 BPM | HEIGHT: 67 IN

## 2020-03-12 DIAGNOSIS — F41.9 ANXIETY: ICD-10-CM

## 2020-03-12 DIAGNOSIS — F39 MOOD DISORDER (H): Primary | ICD-10-CM

## 2020-03-12 DIAGNOSIS — Z12.31 ENCOUNTER FOR SCREENING MAMMOGRAM FOR BREAST CANCER: ICD-10-CM

## 2020-03-12 DIAGNOSIS — J40 BRONCHITIS: ICD-10-CM

## 2020-03-12 PROCEDURE — 99214 OFFICE O/P EST MOD 30 MIN: CPT | Performed by: PHYSICIAN ASSISTANT

## 2020-03-12 RX ORDER — AZITHROMYCIN 250 MG/1
TABLET, FILM COATED ORAL
Qty: 6 TABLET | Refills: 0 | Status: SHIPPED | OUTPATIENT
Start: 2020-03-12 | End: 2020-03-17

## 2020-03-12 ASSESSMENT — ANXIETY QUESTIONNAIRES
1. FEELING NERVOUS, ANXIOUS, OR ON EDGE: NOT AT ALL
3. WORRYING TOO MUCH ABOUT DIFFERENT THINGS: NOT AT ALL
6. BECOMING EASILY ANNOYED OR IRRITABLE: NOT AT ALL
2. NOT BEING ABLE TO STOP OR CONTROL WORRYING: NOT AT ALL
GAD7 TOTAL SCORE: 0
5. BEING SO RESTLESS THAT IT IS HARD TO SIT STILL: NOT AT ALL
7. FEELING AFRAID AS IF SOMETHING AWFUL MIGHT HAPPEN: NOT AT ALL
IF YOU CHECKED OFF ANY PROBLEMS ON THIS QUESTIONNAIRE, HOW DIFFICULT HAVE THESE PROBLEMS MADE IT FOR YOU TO DO YOUR WORK, TAKE CARE OF THINGS AT HOME, OR GET ALONG WITH OTHER PEOPLE: NOT DIFFICULT AT ALL

## 2020-03-12 ASSESSMENT — PATIENT HEALTH QUESTIONNAIRE - PHQ9
SUM OF ALL RESPONSES TO PHQ QUESTIONS 1-9: 2
5. POOR APPETITE OR OVEREATING: NOT AT ALL

## 2020-03-12 ASSESSMENT — MIFFLIN-ST. JEOR: SCORE: 1433.74

## 2020-03-12 NOTE — PATIENT INSTRUCTIONS
Take 5,000 units d3 over the counter daily   Follow up if energy does not improve    Recheck medications 1 year  Recheck physical as soon as possible    Take antibiotic if symptoms do not improve or worsen  We can also consider adding inhaled steroid like flovent in future if needed

## 2020-03-13 ASSESSMENT — ANXIETY QUESTIONNAIRES: GAD7 TOTAL SCORE: 0

## 2020-05-29 DIAGNOSIS — F33.1 MODERATE RECURRENT MAJOR DEPRESSION (H): ICD-10-CM

## 2020-05-29 DIAGNOSIS — F41.1 GAD (GENERALIZED ANXIETY DISORDER): ICD-10-CM

## 2020-06-01 RX ORDER — DESVENLAFAXINE 50 MG/1
TABLET, FILM COATED, EXTENDED RELEASE ORAL
Qty: 90 TABLET | Refills: 2 | Status: SHIPPED | OUTPATIENT
Start: 2020-06-01 | End: 2021-03-19

## 2020-06-01 NOTE — TELEPHONE ENCOUNTER
"Routing refill request to provider for review/approval because:  Requested Prescriptions   Pending Prescriptions Disp Refills    desvenlafaxine (PRISTIQ) 50 MG 24 hr tablet [Pharmacy Med Name: DESVENLAFAXINE SUC ER 50 MG TB] 90 tablet 0     Sig: TAKE 1 TABLET BY MOUTH EVERY DAY       Serotonin-Norepinephrine Reuptake Inhibitors  Failed - 5/29/2020  3:07 PM        Failed - Normal serum creatinine on file in past 12 months     Recent Labs   Lab Test 01/11/16  1552   CR 0.75       Ok to refill medication if creatinine is low          Passed - Blood pressure under 140/90 in past 12 months     BP Readings from Last 3 Encounters:   03/12/20 114/80   12/15/19 130/87   09/16/19 115/71                 Passed - PHQ-9 score of less than 5 in past 6 months     Please review last PHQ-9 score.           Passed - Medication is active on med list        Passed - Patient is age 18 or older        Passed - No active pregnancy on record        Passed - No positive pregnancy test in past 12 months        Passed - Recent (6 mo) or future (30 days) visit within the authorizing provider's specialty     Patient had office visit in the last 6 months or has a visit in the next 30 days with authorizing provider or within the authorizing provider's specialty.  See \"Patient Info\" tab in inbasket, or \"Choose Columns\" in Meds & Orders section of the refill encounter.                     Tanvi DILLARDN, RN, CPN          "

## 2020-11-16 ENCOUNTER — HEALTH MAINTENANCE LETTER (OUTPATIENT)
Age: 50
End: 2020-11-16

## 2021-03-09 NOTE — PATIENT INSTRUCTIONS
Please return fasting for cholesterol and diabetes screening, you can make a lab only appointment for this.  No food or drink other than water for 10 hours.    Call [facility] to schedule breast imaging.  Call .    Try heat and continue ibuprofen as needed        Preventive Health Recommendations  Female Ages 40 to 49    Yearly exam:     See your health care provider every year in order to  1. Review health changes.   2. Discuss preventive care.    3. Review your medicines if your doctor prescribed any.      Get a Pap test every three years (unless you have an abnormal result and your provider advises testing more often).      If you get Pap tests with HPV test, you only need to test every 5 years, unless you have an abnormal result. You do not need a Pap test if your uterus was removed (hysterectomy) and you have not had cancer.      You should be tested each year for STDs (sexually transmitted diseases), if you're at risk.     Ask your doctor if you should have a mammogram.      Have a colonoscopy (test for colon cancer) if someone in your family has had colon cancer or polyps before age 50.       Have a cholesterol test every 5 years.       Have a diabetes test (fasting glucose) after age 45. If you are at risk for diabetes, you should have this test every 3 years.    Shots: Get a flu shot each year. Get a tetanus shot every 10 years.     Nutrition:     Eat at least 5 servings of fruits and vegetables each day.    Eat whole-grain bread, whole-wheat pasta and brown rice instead of white grains and rice.    Get adequate Calcium and Vitamin D.      Lifestyle    Exercise at least 150 minutes a week (an average of 30 minutes a day, 5 days a week). This will help you control your weight and prevent disease.    Limit alcohol to one drink per day.    No smoking.     Wear sunscreen to prevent skin cancer.    See your dentist every six months for an exam and cleaning.

## 2021-03-09 NOTE — PROGRESS NOTES
SUBJECTIVE:   CC: Crystal Marcus is an 50 year old woman who presents for preventive health visit.     Patient has been advised of split billing requirements and indicates understanding: Yes     Healthy Habits:    MAMMO is due per pt, pt will like to konrad appt.    Pt is not fasting and did not have labs completed.    No longer needs paps. S/p hysterectomy.     Answers for HPI/ROS submitted by the patient on 3/19/2021   Annual Exam:  Frequency of exercise:: 4-5 days/week  Getting at least 3 servings of Calcium per day:: Yes  Diet:: Regular (no restrictions)  Taking medications regularly:: Yes  Medication side effects:: None  Bi-annual eye exam:: Yes  Dental care twice a year:: Yes  Sleep apnea or symptoms of sleep apnea:: None  abdominal pain: No  Blood in stool: No  Blood in urine: No  chest pain: No  chills: No  congestion: No  constipation: No  cough: No  diarrhea: No  Additional concerns today:: No  Duration of exercise:: 30-45 minutes      SH--teaches middle school, special ed.     New concern-mastalgia-- ibuprofen helps temporarily.  Comes and goes. No masses. Bilateral breast pain for about 6 months. Mom with breast cancer young.  No nipple discharge. No unintentional weight loss or night sweats.  Will try heat.  Still has ovaries, could be a symptom of menopause which we discussed. However with her history we need to get a diagnostic mammogram.   Does drink caffeine, will try decreasing.   Former smoker.     Medication check:  Depression and Anxiety Follow-Up    How are you doing with your depression since your last visit? Stable    How are you doing with your anxiety since your last visit?  Stable    Are you having other symptoms that might be associated with depression or anxiety? No    Have you had a significant life event? No     Do you have any concerns with your use of alcohol or other drugs? No    On pristiq for years. Has been very helpful. Denies suicidal or homicidal thoughts.  Patient instructed  to go to the emergency room or call 911 if these occur.  We have been monitoring scores.         Social History     Tobacco Use     Smoking status: Former Smoker     Packs/day: 0.20     Years: 12.00     Pack years: 2.40     Types: Cigarettes     Start date: 1988     Quit date: 2000     Years since quittin.5     Smokeless tobacco: Never Used   Substance Use Topics     Alcohol use: Yes     Comment: occ     Drug use: No     PHQ 2019 3/12/2020 3/19/2021   PHQ-9 Total Score 0 2 0   Q9: Thoughts of better off dead/self-harm past 2 weeks Not at all Not at all Not at all     LIDIA-7 SCORE 2019 3/12/2020 3/19/2021   Total Score - - -   Total Score 1 0 0     Last PHQ-9 3/19/2021   1.  Little interest or pleasure in doing things 0   2.  Feeling down, depressed, or hopeless 0   3.  Trouble falling or staying asleep, or sleeping too much 0   4.  Feeling tired or having little energy 0   5.  Poor appetite or overeating 0   6.  Feeling bad about yourself 0   7.  Trouble concentrating 0   8.  Moving slowly or restless 0   Q9: Thoughts of better off dead/self-harm past 2 weeks 0   PHQ-9 Total Score 0   Difficulty at work, home, or with people Not difficult at all     LIDIA-7  3/19/2021   1. Feeling nervous, anxious, or on edge 0   2. Not being able to stop or control worrying 0   3. Worrying too much about different things 0   4. Trouble relaxing 0   5. Being so restless that it is hard to sit still 0   6. Becoming easily annoyed or irritable 0   7. Feeling afraid, as if something awful might happen 0   LIDIA-7 Total Score 0   If you checked any problems, how difficult have they made it for you to do your work, take care of things at home, or get along with other people? Not difficult at all       Suicide Assessment Five-step Evaluation and Treatment (SAFE-T)      Today's PHQ-2 Score:   PHQ-2 (  [employer]) 3/12/2020 2019   Q1: Little interest or pleasure in doing things 1 0   Q2: Feeling down, depressed or  hopeless 0 0   PHQ-2 Score 1 0   Q1: Little interest or pleasure in doing things - -   Q2: Feeling down, depressed or hopeless - -   PHQ-2 Score - -       Abuse: Current or Past(Physical, Sexual or Emotional)- No  Do you feel safe in your environment? Yes        Social History     Tobacco Use     Smoking status: Former Smoker     Packs/day: 0.20     Years: 12.00     Pack years: 2.40     Types: Cigarettes     Start date: 1988     Quit date: 2000     Years since quittin.5     Smokeless tobacco: Never Used   Substance Use Topics     Alcohol use: Yes     Comment: occ     If you drink alcohol do you typically have >3 drinks per day or >7 drinks per week? No                     Reviewed orders with patient.  Reviewed health maintenance and updated orders accordingly - Yes  Lab work is in process  Labs reviewed in EPIC  BP Readings from Last 3 Encounters:   21 116/73   20 114/80   12/15/19 130/87    Wt Readings from Last 3 Encounters:   21 80.3 kg (177 lb)   20 77.1 kg (170 lb)   12/15/19 72.6 kg (160 lb)                  Patient Active Problem List   Diagnosis     FH: breast cancer in first degree relative     CARDIOVASCULAR SCREENING; LDL GOAL LESS THAN 160     Hot flashes     Vitamin D deficiency     Ureterocele, acquired     Other hyperlipidemia     Anxiety     Past Surgical History:   Procedure Laterality Date     BIOPSY  2015     DAVINCI HYSTERECTOMY TOTAL, SALPINGECTOMY BILATERAL Bilateral 2015    Procedure: DAVINCI HYSTERECTOMY TOTAL, SALPINGECTOMY BILATERAL;  Surgeon: Dary Bloom MD;  Location: [facility] OR     HYSTERECTOMY, PAP NO LONGER INDICATED       SURGICAL HISTORY OF -       Cryotherapy     wisdom teeth removal         Social History     Tobacco Use     Smoking status: Former Smoker     Packs/day: 0.20     Years: 12.00     Pack years: 2.40     Types: Cigarettes     Start date: 1988     Quit date: 2000     Years since quittin.5     Smokeless  tobacco: Never Used   Substance Use Topics     Alcohol use: Yes     Comment: occ     Family History   Problem Relation Age of Onset     Breast Cancer Mother      Lipids Mother      Hypertension Mother      Breast Cancer Maternal Grandmother      Hypertension Father      Seizure Disorder Brother          Current Outpatient Medications   Medication Sig Dispense Refill     desvenlafaxine (PRISTIQ) 50 MG 24 hr tablet TAKE 1 TABLET BY MOUTH EVERY DAY 90 tablet 3     multivitamin, therapeutic with minerals (THERA-VIT-M) TABS Take 1 tablet by mouth daily         Breast CA Risk Screening:  No flowsheet data found.  Breast CA Risk Assessment (FHS-7) 3/19/2021   Do you have a family history of breast, colon, or ovarian cancer? Yes   Did any of your first-degree relatives have breast or ovarian cancer? Yes   Did any of your relatives have bilateral breast cancer? No   Did any man in your family have breast cancer? No   Did any woman in your family have breast and ovarian cancer? No   Did any woman in your family have breast cancer before age 50 y? Yes   Do you have 2 or more relatives with breast and/or ovarian cancer? Yes   Do you have 2 or more relatives with breast and/or bowel cancer? No     click delete button to remove this line now  Mammogram Screening - Offered annual screening and updated Health Maintenance for mutual plan based on risk factor consideration    Pertinent mammograms are reviewed under the imaging tab.    Pertinent mammograms are reviewed under the imaging tab.  History of abnormal Pap smear: NO - age 30- 65 PAP every 3 years recommended  PAP / HPV 8/30/2011   PAP NIL     Reviewed and updated as needed this visit by clinical staff                 Reviewed and updated as needed this visit by Provider                Past Medical History:   Diagnosis Date     Arthritis 1/1/2006    X-rays show arthritis in my hips     Complex ovarian cyst 1/15/2015     Depressive disorder      Depressive disorder, not  "elsewhere classified      FH: breast cancer in first degree relative 2011     FH: breast cancer in first degree relative      Hydronephrosis 2015      Past Surgical History:   Procedure Laterality Date     BIOPSY  2015     DAVINCI HYSTERECTOMY TOTAL, SALPINGECTOMY BILATERAL Bilateral 2015    Procedure: DAVINCI HYSTERECTOMY TOTAL, SALPINGECTOMY BILATERAL;  Surgeon: Dary Bloom MD;  Location: [facility]     HYSTERECTOMY, PAP NO LONGER INDICATED       SURGICAL HISTORY OF -       Cryotherapy     wisdom teeth removal       OB History    Para Term  AB Living   2 2 2 0 0 0   SAB TAB Ectopic Multiple Live Births   0 0 0 0 0      # Outcome Date GA Lbr Vito/2nd Weight Sex Delivery Anes PTL Lv   2 Term 05           1 Term 01               ROS:  CONSTITUTIONAL: NEGATIVE for fever, chills, change in weight  INTEGUMENTARY/SKIN: NEGATIVE for worrisome rashes, moles or lesions  EYES: NEGATIVE for vision changes or irritation  ENT: NEGATIVE for ear, mouth and throat problems  RESP: NEGATIVE for significant cough or SOB  CV: NEGATIVE for chest pain, palpitations or peripheral edema  GI: NEGATIVE for nausea, abdominal pain, heartburn, or change in bowel habits  : NEGATIVE for unusual urinary or vaginal symptoms. No vaginal bleeding.  MUSCULOSKELETAL: NEGATIVE for significant arthralgias or myalgia  NEURO: NEGATIVE for weakness, dizziness or paresthesias  PSYCHIATRIC: NEGATIVE for changes in mood or affect     OBJECTIVE:   /73   Pulse 72   Temp 98.4  F (36.9  C) (Tympanic)   Resp 16   Ht 1.702 m (5' 7\")   Wt 80.3 kg (177 lb)   LMP 2015   SpO2 97%   Breastfeeding No   BMI 27.72 kg/m    EXAM:  GENERAL: healthy, alert and no distress  EYES: Eyes grossly normal to inspection, PERRL and conjunctivae and sclerae normal  HENT: ear canals and TM's normal, nose and mouth without ulcers or lesions  NECK: no adenopathy, no asymmetry, masses, or scars and thyroid " normal to palpation  RESP: lungs clear to auscultation - no rales, rhonchi or wheezes  BREAST: normal without masses, tenderness or nipple discharge and no palpable axillary masses or adenopathy  CV: regular rate and rhythm, normal S1 S2, no S3 or S4, no murmur, click or rub, no peripheral edema and peripheral pulses strong  ABDOMEN: soft, nontender, no hepatosplenomegaly, no masses and bowel sounds normal  MS: no gross musculoskeletal defects noted, no edema  SKIN: no suspicious lesions or rashes. On back- 4 x 5 mm normal appearing nevus on back. Will monitor for growth. She will at home also.   NEURO: Normal strength and tone, mentation intact and speech normal  PSYCH: mentation appears normal, affect normal/bright        ASSESSMENT/PLAN:   1. Routine general medical examination at a health care facility    - Basic metabolic panel; Future    2. Moderate recurrent major depression (H)  stable  - desvenlafaxine (PRISTIQ) 50 MG 24 hr tablet; TAKE 1 TABLET BY MOUTH EVERY DAY  Dispense: 90 tablet; Refill: 3  - OFFICE/OUTPT VISIT,EST,LEVL IV    3. LIDIA (generalized anxiety disorder)  stable  - desvenlafaxine (PRISTIQ) 50 MG 24 hr tablet; TAKE 1 TABLET BY MOUTH EVERY DAY  Dispense: 90 tablet; Refill: 3  - OFFICE/OUTPT VISIT,EST,LEVL IV    4. Breast pain  Still has ovaries, could be a symptom of menopause which we discussed. However with her history we need to get a diagnostic mammogram.   See patient instructions below for more plan.    - MA Diagnostic Digital Bilateral; Future  - OFFICE/OUTPT VISIT,EST,LEVL IV    5. Screening for hyperlipidemia    - Lipid panel reflex to direct LDL Fasting; Future    6. Family history of malignant neoplasm of breast    - CANCER RISK MGMT/CANCER GENETIC COUNSELING REFERRAL    Patient has been advised of split billing requirements and indicates understanding: Yes  COUNSELING:   Reviewed preventive health counseling, as reflected in patient instructions       Regular exercise    Estimated  "body mass index is 26.63 kg/m  as calculated from the following:    Height as of 3/12/20: 1.702 m (5' 7\").    Weight as of 3/12/20: 77.1 kg (170 lb).    Weight management plan: Discussed healthy diet and exercise guidelines    She reports that she quit smoking about 20 years ago. Her smoking use included cigarettes. She started smoking about 33 years ago. She has a 2.40 pack-year smoking history. She has never used smokeless tobacco.    Patient Instructions   Please return fasting for cholesterol and diabetes screening, you can make a lab only appointment for this.  No food or drink other than water for 10 hours.    [facility] to schedule [facility].  Call .    Try heat and continue ibuprofen as needed        Preventive Health Recommendations  Female Ages 40 to 49    Yearly exam:     See your health care provider every year in order to  1. Review health changes.   2. Discuss preventive care.    3. Review your medicines if your doctor prescribed any.      Get a Pap test every three years (unless you have an abnormal result and your provider advises testing more often).      If you get Pap tests with HPV test, you only need to test every 5 years, unless you have an abnormal result. You do not need a Pap test if your uterus was removed (hysterectomy) and you have not had cancer.      You should be tested each year for STDs (sexually transmitted diseases), if you're at risk.     Ask your doctor if you should have a mammogram.      Have a colonoscopy (test for colon cancer) if someone in your family has had colon cancer or polyps before age 50.       Have a cholesterol test every 5 years.       Have a diabetes test (fasting glucose) after age 45. If you are at risk for diabetes, you should have this test every 3 years.    Shots: Get a flu shot each year. Get a tetanus shot every 10 years.     Nutrition:     Eat at least 5 servings of fruits and vegetables each day.    Eat whole-grain " bread, whole-wheat pasta and brown rice instead of white grains and rice.    Get adequate Calcium and Vitamin D.      Lifestyle    Exercise at least 150 minutes a week (an average of 30 minutes a day, 5 days a week). This will help you control your weight and prevent disease.    Limit alcohol to one drink per day.    No smoking.     Wear sunscreen to prevent skin cancer.    See your dentist every six months for an exam and cleaning.        Counseling Resources:  ATP IV Guidelines  Pooled Cohorts Equation Calculator  Breast Cancer Risk Calculator  BRCA-Related Cancer Risk Assessment: FHS-7 Tool  FRAX Risk Assessment  ICSI Preventive Guidelines  Dietary Guidelines for Americans, 2010  USDA's MyPlate  ASA Prophylaxis  Lung CA Screening    TYLER [facility]

## 2021-03-19 ENCOUNTER — PRE VISIT (OUTPATIENT)
Dept: ONCOLOGY | Facility: CLINIC | Age: 51
End: 2021-03-19

## 2021-03-19 ENCOUNTER — OFFICE VISIT (OUTPATIENT)
Dept: FAMILY MEDICINE | Facility: CLINIC | Age: 51
End: 2021-03-19
Payer: COMMERCIAL

## 2021-03-19 VITALS
SYSTOLIC BLOOD PRESSURE: 116 MMHG | RESPIRATION RATE: 16 BRPM | DIASTOLIC BLOOD PRESSURE: 73 MMHG | OXYGEN SATURATION: 97 % | HEART RATE: 72 BPM | HEIGHT: 67 IN | TEMPERATURE: 98.4 F | BODY MASS INDEX: 27.78 KG/M2 | WEIGHT: 177 LBS

## 2021-03-19 DIAGNOSIS — N64.4 BREAST PAIN: ICD-10-CM

## 2021-03-19 DIAGNOSIS — F33.1 MODERATE RECURRENT MAJOR DEPRESSION (H): ICD-10-CM

## 2021-03-19 DIAGNOSIS — Z80.3 FAMILY HISTORY OF MALIGNANT NEOPLASM OF BREAST: ICD-10-CM

## 2021-03-19 DIAGNOSIS — Z00.00 ROUTINE GENERAL MEDICAL EXAMINATION AT A HEALTH CARE FACILITY: Primary | ICD-10-CM

## 2021-03-19 DIAGNOSIS — F41.1 GAD (GENERALIZED ANXIETY DISORDER): ICD-10-CM

## 2021-03-19 DIAGNOSIS — Z13.220 SCREENING FOR HYPERLIPIDEMIA: ICD-10-CM

## 2021-03-19 PROCEDURE — 99396 PREV VISIT EST AGE 40-64: CPT | Performed by: PHYSICIAN ASSISTANT

## 2021-03-19 PROCEDURE — 96127 BRIEF EMOTIONAL/BEHAV ASSMT: CPT | Performed by: PHYSICIAN ASSISTANT

## 2021-03-19 PROCEDURE — 99214 OFFICE O/P EST MOD 30 MIN: CPT | Mod: 25 | Performed by: PHYSICIAN ASSISTANT

## 2021-03-19 RX ORDER — DESVENLAFAXINE 50 MG/1
TABLET, FILM COATED, EXTENDED RELEASE ORAL
Qty: 90 TABLET | Refills: 3 | Status: SHIPPED | OUTPATIENT
Start: 2021-03-19 | End: 2021-12-09

## 2021-03-19 ASSESSMENT — ENCOUNTER SYMPTOMS
CONSTIPATION: 0
ABDOMINAL PAIN: 0
CHILLS: 0
HEMATOCHEZIA: 0
DIARRHEA: 0
COUGH: 0
HEMATURIA: 0

## 2021-03-19 ASSESSMENT — ANXIETY QUESTIONNAIRES
2. NOT BEING ABLE TO STOP OR CONTROL WORRYING: NOT AT ALL
IF YOU CHECKED OFF ANY PROBLEMS ON THIS QUESTIONNAIRE, HOW DIFFICULT HAVE THESE PROBLEMS MADE IT FOR YOU TO DO YOUR WORK, TAKE CARE OF THINGS AT HOME, OR GET ALONG WITH OTHER PEOPLE: NOT DIFFICULT AT ALL
1. FEELING NERVOUS, ANXIOUS, OR ON EDGE: NOT AT ALL
5. BEING SO RESTLESS THAT IT IS HARD TO SIT STILL: NOT AT ALL
3. WORRYING TOO MUCH ABOUT DIFFERENT THINGS: NOT AT ALL
6. BECOMING EASILY ANNOYED OR IRRITABLE: NOT AT ALL
GAD7 TOTAL SCORE: 0
7. FEELING AFRAID AS IF SOMETHING AWFUL MIGHT HAPPEN: NOT AT ALL

## 2021-03-19 ASSESSMENT — PATIENT HEALTH QUESTIONNAIRE - PHQ9
5. POOR APPETITE OR OVEREATING: NOT AT ALL
SUM OF ALL RESPONSES TO PHQ QUESTIONS 1-9: 0

## 2021-03-19 ASSESSMENT — MIFFLIN-ST. JEOR: SCORE: 1460.5

## 2021-03-19 NOTE — TELEPHONE ENCOUNTER
ONCOLOGY INTAKE: Records Information      APPT INFORMATION:  Referring provider:  Dr. Abarca  Referring provider s clinic:  [facility]  Reason for visit/diagnosis:  Family history of malignant neoplasm of breast  Has patient been notified of appointment date and time?: yes    RECORDS INFORMATION:  Were the records received with the referral (via [employer])? no    Has patient been seen for any external appt for this diagnosis? no    If yes, where? n/a    Has patient had any imaging or procedures outside of [facility] for this condition? no      If Yes, where? n/a    ADDITIONAL INFORMATION:  none

## 2021-03-20 ASSESSMENT — ANXIETY QUESTIONNAIRES: GAD7 TOTAL SCORE: 0

## 2021-04-14 ENCOUNTER — ANCILLARY PROCEDURE (OUTPATIENT)
Dept: MAMMOGRAPHY | Facility: CLINIC | Age: 51
End: 2021-04-14
Attending: PHYSICIAN ASSISTANT
Payer: COMMERCIAL

## 2021-04-14 DIAGNOSIS — N64.4 BREAST PAIN: ICD-10-CM

## 2021-04-14 PROCEDURE — 77066 DX MAMMO INCL CAD BI: CPT

## 2021-04-14 PROCEDURE — G0279 TOMOSYNTHESIS, MAMMO: HCPCS

## 2021-06-21 ENCOUNTER — VIRTUAL VISIT (OUTPATIENT)
Dept: ONCOLOGY | Facility: CLINIC | Age: 51
End: 2021-06-21
Attending: PHYSICIAN ASSISTANT
Payer: COMMERCIAL

## 2021-06-21 DIAGNOSIS — Z80.0 FAMILY HISTORY OF PANCREATIC CANCER: ICD-10-CM

## 2021-06-21 DIAGNOSIS — Z80.3 FAMILY HISTORY OF MALIGNANT NEOPLASM OF BREAST: Primary | ICD-10-CM

## 2021-06-21 PROCEDURE — 96040 HC GENETIC COUNSELING, EACH 30 MINUTES: CPT | Mod: GT | Performed by: GENETIC COUNSELOR, MS

## 2021-06-21 NOTE — LETTER
6/21/2021         RE: Crystal Marcus  [id number]  [place] [address]        Dear Colleague,    Thank you for referring your patient, Crystal Marcus, to the [facility]. Please see a copy of my visit note below.    6/21/2021    Referring Provider: Crystal Abarca PA-C    Presenting Information:   I met with Crystal Marcus today for genetic counseling at the Cancer Risk Management Program (telephone visit) to discuss her family history of breast cancer.  She is here today to review this history, cancer screening recommendations, and available genetic testing options.    Personal History:  Crystal is a 50 year old female. She does not have any personal history of cancer.  She had her first menstrual period at age 14 and her first child at age 30.  She has had a hysterectomy (uterus, cervix, bilateral fallopian tubes) in 2015. Mammogram screening from 4/14/2021 was negative.        Family History: (Please see scanned pedigree for detailed family history information)    Her mother was diagnosed with breast cancer at age 45.  She has not completed genetic testing.    Her maternal grandmother was diagnosed with breast cancer in her 60's    One paternal first cousin may have had a history of cancer, diagnosed in her 40's    Discussion:    Crystal's family history of breast cancer may be suggestive of a hereditary cancer syndrome.    We reviewed the features of sporadic, familial, and hereditary cancers. Based on her family history, Crystal meets current National Comprehensive Cancer Network (NCCN) criteria for genetic testing of high penetrance breast cancer susceptibility genes (including BRCA1/2, PTEN, PALB2, TP53, CDH1, among others).     We discussed the natural history and genetics of hereditary breast and ovarian cancer syndrome caused by mutations in the BRCA1 and BRCA2 genes.     A detailed handout regarding hereditary breast cancer and the information we discussed can be found in the after visit  summary. Topics included: inheritance pattern, cancer risks, cancer screening recommendations, and also risks, benefits and limitations of testing.     We discussed that there are additional genes that could cause increased risk for breast and other cancers. As many of these genes present with overlapping features in a family and accurate cancer risk cannot always be established based upon the pedigree analysis alone, it would be reasonable for Crystal to consider panel genetic testing to analyze multiple genes at once.    Crystal expressed interest in proceeding with genetic testing for the CancerNext panel through [facility].  This test includes 36 genes related to hereditary breast, gynecologic, and gastrointestinal cancers: APC, WILFRID, AXIN2, BARD1, BMPR1A, BRCA1, BRCA2, BRIP1, CDH1, CDK4, CDKN2A, CHEK2, DICER1, EPCAM, GREM1, HOXB13, MLH1, MSH2, MSH3, MSH6, MUTYH, NBN, NF1, NTHL1, PALB2, PMS2, POLD1, POLE, PTEN, RAD51C, RAD51D, RECQL, SMAD4, SMARCA4, STK11, and TP53.    Verbal consent was obtained and genetic testing for CancerNext was sent to [facility]. Turn around time: 5 weeks.    Medical Management: For Crystal, we reviewed that the information from genetic testing may determine:    additional cancer screening for which Crystal may qualify (i.e. mammogram and breast MRI, more frequent colonoscopies, more frequent dermatologic exams, etc.),    options for risk reducing surgeries Crystal could consider (i.e. bilateral mastectomy),      and targeted chemotherapies for certain cancers (i.e. immunotherapy for individuals with Gomez syndrome, PARP inhibitors, etc.).     These recommendations will be discussed in detail once genetic testing is completed.     Plan:  1) Today Crystal elected to proceed with the CancerNext gene panel through [facility].  2) This information should be available in 4 weeks.  3) Crystal will return to clinic to discuss the results.    Approximate time spent over phone: 25 minutes    Yarely  MS Marilee, [facility]  Licensed, Certified Genetic Counselor  [facility]  Phone: [phone]                Again, thank you for allowing me to participate in the care of your patient.        Sincerely,        Yarely Hunt GC

## 2021-06-21 NOTE — PROGRESS NOTES
6/21/2021    Referring Provider: Crystal Abarca PA-C    Presenting Information:   I met with Crystal Marcus today for genetic counseling at the Cancer Risk Management Program (telephone visit) to discuss her family history of breast cancer.  She is here today to review this history, cancer screening recommendations, and available genetic testing options.    Personal History:  Crystal is a 50 year old female. She does not have any personal history of cancer.  She had her first menstrual period at age 14 and her first child at age 30.  She has had a hysterectomy (uterus, cervix, bilateral fallopian tubes) in 2015. Mammogram screening from 4/14/2021 was negative.        Family History: (Please see scanned pedigree for detailed family history information)    Her mother was diagnosed with breast cancer at age 45.  She has not completed genetic testing.    Her maternal grandmother was diagnosed with breast cancer in her 60's    One paternal first cousin may have had a history of cancer, diagnosed in her 40's    Discussion:    Crystal's family history of breast cancer may be suggestive of a hereditary cancer syndrome.    We reviewed the features of sporadic, familial, and hereditary cancers. Based on her family history, Crystal meets current National Comprehensive Cancer Network (NCCN) criteria for genetic testing of high penetrance breast cancer susceptibility genes (including BRCA1/2, PTEN, PALB2, TP53, CDH1, among others).     We discussed the natural history and genetics of hereditary breast and ovarian cancer syndrome caused by mutations in the BRCA1 and BRCA2 genes.     A detailed handout regarding hereditary breast cancer and the information we discussed can be found in the after visit summary. Topics included: inheritance pattern, cancer risks, cancer screening recommendations, and also risks, benefits and limitations of testing.     We discussed that there are additional genes that could cause increased risk for breast and other  cancers. As many of these genes present with overlapping features in a family and accurate cancer risk cannot always be established based upon the pedigree analysis alone, it would be reasonable for Crystal to consider panel genetic testing to analyze multiple genes at once.    Crystal expressed interest in proceeding with genetic testing for the CancerNext panel through [facility].  This test includes 36 genes related to hereditary breast, gynecologic, and gastrointestinal cancers: APC, WILFRID, AXIN2, BARD1, BMPR1A, BRCA1, BRCA2, BRIP1, CDH1, CDK4, CDKN2A, CHEK2, DICER1, EPCAM, GREM1, HOXB13, MLH1, MSH2, MSH3, MSH6, MUTYH, NBN, NF1, NTHL1, PALB2, PMS2, POLD1, POLE, PTEN, RAD51C, RAD51D, RECQL, SMAD4, SMARCA4, STK11, and TP53.    Verbal consent was obtained and genetic testing for CancerNext was sent to [facility]. Turn around time: 5 weeks.    Medical Management: For Crystal, we reviewed that the information from genetic testing may determine:    additional cancer screening for which Crystal may qualify (i.e. mammogram and breast MRI, more frequent colonoscopies, more frequent dermatologic exams, etc.),    options for risk reducing surgeries Crystal could consider (i.e. bilateral mastectomy),      and targeted chemotherapies for certain cancers (i.e. immunotherapy for individuals with Gomez syndrome, PARP inhibitors, etc.).     These recommendations will be discussed in detail once genetic testing is completed.     Plan:  1) Today Crystal elected to proceed with the CancerNext gene panel through [facility].  2) This information should be available in 4 weeks.  3) Crystal will return to clinic to discuss the results.    Approximate time spent over phone: 25 minutes    Yarely Hunt MS, [facility] [place]  Licensed, Certified Genetic Counselor  [facility]  Phone: [phone]

## 2021-06-24 NOTE — PATIENT INSTRUCTIONS
Assessing Cancer Risk  Only about 5-10% of cancers are thought to be due to an inherited cancer susceptibility gene.    These families often have:    Several people with the same or related types of cancer    Cancers diagnosed at a young age (before age 50)    Individuals with more than one primary cancer    Multiple generations of the family affected with cancer    Some people may be candidates for genetic testing of more than one gene.  For these families, genetic testing using a cancer panel may be offered.  These panels will test different genes known to increase the risk for breast, ovarian, uterine, and/or other cancers. All of the genes discussed below have published clinical management guidelines for individuals who are found to carry a mutation. The purpose of this handout is to serve as a brief summary of the genes analyzed by the panels used to inquire about hereditary breast and gynecologic cancer:  WILFRID, BRCA1, BRCA2, BRIP1, CDH1, CHEK2, MLH1, MSH2, MSH6, PMS2, EPCAM, PTEN, PALB2, RAD51C, RAD51D, and TP53.  ______________________________________________________________________________  Hereditary Breast and Ovarian Cancer Syndrome   (BRCA1 and BRCA2)  A single mutation in one of the copies of BRCA1 or BRCA2 increases the risk for breast and ovarian cancer, among others.  The risk for pancreatic cancer and melanoma may also be slightly increased in some families.  The chart below shows the chance that someone with a BRCA mutation would develop cancer in his or her lifetime1,2,3,4.        A person s ethnic background is also important to consider, as individuals of Ashkenazi Jain ancestry have a higher chance of having a BRCA gene mutation.  There are three BRCA mutations that occur more frequently in this population.    Gomez Syndrome   (MLH1, MSH2, MSH6, PMS2, and EPCAM)  Currently five genes are known to cause Gomez Syndrome: MLH1, MSH2, MSH6, PMS2, and EPCAM.  A single mutation in one of the  Gomez Syndrome genes increases the risk for colon, endometrial, ovarian, and stomach cancers.  Other cancers that occur less commonly in Gomez Syndrome include urinary tract, skin, and brain cancers.  The chart below shows the chance that a person with Gomez syndrome would develop cancer in his or her lifetime5.      *Cancer risk varies depending on Gomez syndrome gene found    Cowden Syndrome   (PTEN)  Cowden syndrome is a hereditary condition that increases the risk for breast, thyroid, endometrial, colon, and kidney cancer.  Cowden syndrome is caused by a mutation in the PTEN gene.  A single mutation in one of the copies of PTEN causes Cowden syndrome and increases cancer risk.  The chart below shows the chance that someone with a PTEN mutation would develop cancer in their lifetime6,7.  Other benign features seen in some individuals with Cowden syndrome include benign skin lesions (facial papules, keratoses, lipomas), learning disability, autism, thyroid nodules, colon polyps, and larger head size.      *One recent study found breast cancer risk to be increased to 85%    Li-Fraumeni Syndrome   (TP53)  Li-Fraumeni Syndrome (LFS) is a cancer predisposition syndrome caused by a mutation in the TP53 gene. A single mutation in one of the copies of TP53 increases the risk for multiple cancers. Individuals with LFS are at an increased risk for developing cancer at a young age. The lifetime risk for development of a LFS-associated cancer is 50% by age 30 and 90% by age 60.   Core Cancers: Sarcomas, Breast, Brain, Lung, Leukemias/Lymphomas, Adrenocortical carcinomas  Other Cancers: Gastrointestinal, Thyroid, Skin, Genitourinary    Hereditary Diffuse Gastric Cancer   (CDH1)  Currently, one gene is known to cause hereditary diffuse gastric cancer (HDGC): CDH1.  Individuals with HDGC are at increased risk for diffuse gastric cancer and lobular breast cancer. Of people diagnosed with HDGC, 30-50% have a mutation in the CDH1  gene.  This suggests there are likely other genes that may cause HDGC that have not been identified yet.      Lifetime Cancer Risks    General Population HDGC    Diffuse Gastric  <1% ~80%   Breast 12% 39-52%         Additional Genes  WILFRID  WILFRID is a moderate-risk breast cancer gene. Women who have a mutation in WILFRID can have between a 2-4 fold increased risk for breast cancer compared to the general population8. WILFRID mutations have also been associated with increased risk for pancreatic cancer, however an estimate of this cancer risk is not well understood9. Individuals who inherit two WILFRID mutations have a condition called ataxia-telangiectasia (AT).  This rare autosomal recessive condition affects the nervous system and immune system, and is associated with progressive cerebellar ataxia beginning in childhood.  Individuals with ataxia-telangiectasia often have a weakened immune system and have an increased risk for childhood cancers.    PALB2  Mutations in PALB2 have been shown to increase the risk of breast cancer up to 33-58% in some families; where individuals fall within this risk range is dependent upon family [id number]. PALB2 mutations have also been associated with increased risk for pancreatic cancer, although this risk has not been quantified yet.  Individuals who inherit two PALB2 mutations--one from their mother and one from their father--have a condition called Fanconi Anemia.  This rare autosomal recessive condition is associated with short stature, developmental delay, bone marrow failure, and increased risk for childhood cancers.    CHEK2   CHEK2 is a moderate-risk breast cancer gene.  Women who have a mutation in CHEK2 have around a 2-fold increased risk for breast cancer compared to the general population, and this risk may be higher depending upon family history.11,12,13 Mutations in CHEK2 have also been shown to increase the risk of a number of other cancers, including colon and prostate, however  these cancer risks are currently not well understood.    BRIP1, RAD51C and RAD51D  Mutations in BRIP1, RAD51C, and RAD51D have been shown to increase the risk of ovarian cancer and possibly female breast cancer as well14,15 .       Lifetime Cancer Risk    General Population BRIP1 RAD51C RAD51D   Ovarian 1-2% ~5-8% ~5-9% ~7-15%           Inheritance  All of the cancer syndromes reviewed above are inherited in an autosomal dominant pattern.  This means that if a parent has a mutation, each of his or her children will have a 50% chance of inheriting that same mutation.  Therefore, each child--male or female--would have a 50% chance of being at increased risk for developing cancer.      Image obtained from Genetics Home Reference, 2013     Mutations in some genes can occur de alayna, which means that a person s mutation occurred for the first time in them and was not inherited from a parent.  Now that they have the mutation, however, it can be passed on to future generations.    Genetic Testing  Genetic testing involves a blood test and will look at the genetic information in the WILFRID, BRCA1, BRCA2, BRIP1, CDH1, CHEK2, MLH1, MSH2, MSH6, PMS2, EPCAM, PTEN, PALB2, RAD51C, RAD51D, and TP53 genes for any harmful mutations that are associated with increased cancer risk.  If possible, it is recommended that the person(s) who has had cancer be tested before other family members.  That person will give us the most useful information about whether or not a specific gene is associated with the cancer in the family.    Results  There are three possible results of genetic testing:    Positive--a harmful mutation was identified in one or more of the genes    Negative--no mutation was identified in any of the genes on this panel    Variant of unknown significance--a variation in one of the genes was identified, but it is unclear how this impacts cancer risk in the family    Advantages and Disadvantages   There are advantages and  disadvantages to genetic testing.    Advantages    May clarify your cancer risk    Can help you make medical decisions    May explain the cancers in your family    May give useful information to your family members (if you share your results)    Disadvantages    Possible negative emotional impact of learning about inherited cancer risk    Uncertainty in interpreting a negative test result in some situations    Possible genetic discrimination concerns (see below)    Genetic Information Nondiscrimination Act (RUDOLPH)  [place] is a federal law that protects individuals from health insurance or employment discrimination based on a genetic test result alone.  Although rare, there are currently no legal discrimination protections in terms of life insurance, long term care, or disability insurances.  Visit the [employer] [place] website to learn more.    Reducing Cancer Risk  All of the genes described above have nationally recognized cancer screening guidelines that would be recommended for individuals who test positive.  In addition to increased cancer screening, surgeries may be offered or recommended to reduce cancer risk.  Recommendations are based upon an individual s genetic test result as well as their personal and family history of cancer.    Questions to Think About Regarding Genetic Testing:    What effect will the test result have on me and my relationship with my family members if I have an inherited gene mutation?  If I don t have a gene mutation?    Should I share my test results, and how will my family react to this news, which may also affect them?    Are my children ready to learn new information that may one day affect their own health?    Hereditary Cancer Resources    FORCE: Facing Our Risk of Cancer Empowered facingourrisk.org   Bright Pink bebrightpink.org   Li-Fraumeni Syndrome Association lfsassociation.org   PTEN World PTENworld.com   No stomach for cancer, Inc.  nostomachforcancer.org   Stomach cancer relief network Scrnet.org   Collaborative Group of the Americas on Inherited Colorectal Cancer (CGA) cgaicc.com    Cancer Care cancercare.org   American Cancer Society (ACS) cancer.org   National Cancer [place] (NCI) cancer.gov     Please call us if you have any questions or concerns.   Cancer Risk Management Program [phone] ([phone])  ? Karthikeyan Villalpando, MS, [facility] [phone]  ? Jacqueline Jones, MS, [facility]  [phone]  ? Yarely Hunt, MS, [facility]  [phone]  ? Clarita Card, MS, [facility] [phone]  ? Jenni Iris, MS, [facility] [phone]  ? Zenia Walker, MS, [facility]  [phone]    References  1. Matt A, Julio PDP, Erna S, Adelina ZHANG, Cruz JE, Raghav JL, Jeanette N, Rolo H, Herberth O, Hilda A, Rajwinder B, Helen P, Manbora S, Kristen DM, Amos N, Aide E, Ashlyn H, Rolando E, Josh J, Gronmeagan J, Monika B, Ingrid H, Thorlacius S, Eerola H, Glenda H, Rangel K, Aravind OP. Average risks of breast and ovarian cancer associated with BRCA1 or BRCA2 mutations detected in case series unselected for family history: a combined analysis of 222 studies. Am J Hum Jenny. 2003;72:1117-30.  2. Wilder MACIAS, Lisette M, Augustus G.  BRCA1 and BRCA2 Hereditary Breast and Ovarian Cancer. Gene Reviews online. 2013.  3. [place] YC, Roman S, Jesse G, Maurice S. Breast cancer risk among male BRCA1 and BRCA2 mutation carriers. J Natl Cancer Inst. 2007;99:1811-4.  4. Miko LONDONO, Keren I, Ray J, Francois E, Gayle ER, Arnel F. Risk of breast cancer in male BRCA2 carriers. J Med Jenny. 2010;47:710-1.  5. National Comprehensive Cancer Network. Clinical practice guidelines in oncology, colorectal cancer screening. Available online (registration required). 2015.  6. Max WORLEY, Surya J, Darryl J, Emily LA, Cain HOOD, Angus C. Lifetime cancer risks in individuals with germline PTEN mutations. Clin Cancer Res. 2012;18:400-7.  7. Marco Antonio MUJICA. Cowden Syndrome: A Critical Review of the  Clinical Literature. J Jenny . 2009:18:13-27.  8. Ness A, Chay D, Mindy S, Zeynep P, Kem T, Brodie M, Gucci B, Aj H, Moe R, Grant K, Darwin L, Miko DG, Kristen D, Dheeraj DF, Raul MR, The Breast Cancer Susceptibility Collaboration () & Radha MACIAS. WILFRID mutations that cause ataxia-telangiectasia are breast cancer susceptibility alleles. Nature Genetics. 2006;38:873-875  9. Ismael N , Jose Roberto Y, Ita J, Ivania L, Nam GM , Concetta ML, Gallinger S, Carroll AG, Syngal S, Zoraida ML, Aparna J , Mark R, Diamante SZ, Prosper JR, Sarah VE, Clara M, Volucie B, Mingo N, Sanford RH, Reilly KW, and Brittney AP. WILFRID mutations in patients with hereditary pancreatic cancer. Cancer Discover. 2012;2:41-46  10. Matt TEJEDA, et al. Breast-Cancer Risk in Families with Mutations in PALB2. NEJM. 2014; 371(6):497-506.  11. CHEK2 Breast Cancer Case-Control Consortium. CHEK2*1100delC and susceptibility to breast cancer: A collaborative analysis involving 10,860 breast cancer cases and 9,065 controls from 10 studies. Am J Hum Jenny, 74 (2004), pp. 5969-0274  12. Nicki T, Gabriele S, Yusef K, et al. Spectrum of Mutations in BRCA1, BRCA2, CHEK2, and TP53 in Families at High Risk of Breast Cancer. BENY. 2006;295(12):9268-0382.   13. Ji C, Carla D, Everett A, et al. Risk of breast cancer in women with a CHEK2 mutation with and without a family history of breast cancer. J Clin Oncol. 2011;29:3229-7106.  14. Royce H, Luis E, Kaitlin SJ, et al. Contribution of germline mutations in the RAD51B, RAD51C, and RAD51D genes to ovarian cancer in the population. J Clin Oncol. 2015;33(26):0765-6261. Doi:10.1200/JCO.2015.61.2408.  15. Pipo T, Janine SIMON, Mauro P, et al. Mutations in BRIP1 confer high risk of ovarian cancer. Leida Jenny. 2011;43(11):3635-4029. doi:10.1038/ng.955.

## 2021-07-23 ENCOUNTER — TELEPHONE (OUTPATIENT)
Dept: ONCOLOGY | Facility: CLINIC | Age: 51
End: 2021-07-23

## 2021-07-23 NOTE — CONFIDENTIAL NOTE
7/23/21 - [facility] to call [phone] opt5, opt2 to schedule virtual return with Yarely Hunt ANYTIME for genetic testing results. -Simone

## 2021-07-29 ENCOUNTER — TELEPHONE (OUTPATIENT)
Dept: ONCOLOGY | Facility: CLINIC | Age: 51
End: 2021-07-29

## 2021-07-29 NOTE — CONFIDENTIAL NOTE
7/29/21 - [facility] to call [phone] opt5, opt2 to schedule virtual return with Yarely Hunt ANYTIME for genetic testing results. -Simone

## 2021-08-04 ENCOUNTER — VIRTUAL VISIT (OUTPATIENT)
Dept: ONCOLOGY | Facility: CLINIC | Age: 51
End: 2021-08-04
Attending: GENETIC COUNSELOR, MS
Payer: COMMERCIAL

## 2021-08-04 DIAGNOSIS — Z80.3 FAMILY HISTORY OF MALIGNANT NEOPLASM OF BREAST: Primary | ICD-10-CM

## 2021-08-04 DIAGNOSIS — Z80.0 FAMILY HISTORY OF PANCREATIC CANCER: ICD-10-CM

## 2021-08-04 PROCEDURE — 999N000069 HC STATISTIC GENETIC COUNSELING, < 16 MIN: Mod: GT | Performed by: GENETIC COUNSELOR, MS

## 2021-08-04 NOTE — LETTER
"    8/4/2021         RE: Crystal Marcus  [address] [address]        Dear Colleague,    Thank you for referring your patient, Crystal Marcus, to the [facility]. Please see a copy of my visit note below.    8/4/2021    Referring Provider: Crystal Abarca PA-C    Presenting Information:  I spoke to Crystal by phone today to discuss her genetic testing results as part of the Cancer Risk Management Program.  She was seen for genetic counseling 6/21/2021, and genetic testing for the CancerNext panel was sent to [facility].  Results from this test were discussed today.    Genetic Testing Result: NEGATIVE  Crystal is negative for mutations in the 36 genes tested.  No pathogenic mutations, variants of unknown significance, or gross deletions or duplications were detected.     Genes Analyzed (36 total): APC, WILFRID, AXIN2, BARD1, BMPR1A, BRCA1, BRCA2, BRIP1, CDH1, CDK4, CDKN2A, CHEK2, DICER1, MLH1, MSH2, MSH3, MSH6, MUTYH, NBN, NF1, NTHL1, PALB2, PMS2, PTEN, RAD51C, RAD51D, RECQL, SMAD4, SMARCA4, STK11 and TP53 (sequencing and deletion/duplication); HOXB13, POLD1 and POLE (sequencing only); EPCAM and GREM1 (deletion/duplication only).    A copy of the test report can be found in the Laboratory tab, dated 7/10/2021, and named \"LAB MISC ORDER\". The report is scanned in as a linked document.    Interpretation:  We discussed several different interpretations of this negative test result.    1. One explanation may be that there is a different gene or combination of genes and environment that are associated with the cancers in this family.  2. It is possible that her mother, or other close relative, did have a mutation in one of these genes which Crystal did not inherit.  3. There is also a small possibility that there is a mutation in one of these genes, and the testing laboratory could not find it with their current testing methods.       Screening:  Based on this negative test result, it is important " for Crystal and her relatives to refer back to the family history for appropriate cancer screening.      Based on her personal and family history, Crystal has a 23.9-25.3% lifetime risk of developing breast cancer based on the Cholo/IBISv8 models. As such, Crystal meets current National Comprehensive Cancer Network (NCCN) guidelines for high risk breast screening. This includes annual breast MRI in addition to annual mammogram.  In addition, Crystal should be receiving clinical breast exams by her physician. We discussed that Crystal could participate in our Cancer Risk Management Program in which our Physician Assistant provides an individual screening plan and assists with medical management. Crystal shared that she plans to follow-up with her primary care provider to discuss breast screening options      Other population cancer screening options, such as those recommended by the American Cancer Society and the National Comprehensive Cancer Network (NCCN), are also appropriate for Crystal at this time. These screening recommendations may change if there are changes to Crystal's personal and/or family history of cancer. Final screening recommendations should be made by each individual's managing physician.    Inheritance:  We reviewed the autosomal dominant inheritance of the genes tested. We discussed that Crystal cannot/did not pass on an identifiable mutation in these genes to her children based on this test result.  Mutations in these genes do not skip generations.      Additional Testing Considerations:  Although Crystal's genetic testing result was negative, other relatives (such as Crystal's mother) may still carry a gene mutation associated with hereditary cancer. Genetic counseling is recommended for Crystal's close relatives to discuss genetic testing options.  If any of these relatives do pursue genetic testing, Crystal is encouraged to contact me so that we may review the impact of their test results on her.    Summary:  We do not have an explanation for  Crystal's family history of cancer. While no genetic changes were identified, Crystal may still be at risk for certain cancers due to family history, environmental factors, or other genetic causes not identified by this test.  Because of that, it is important that she continue with cancer screening based on her personal and family history as discussed above.    Genetic testing is rapidly advancing, and new cancer susceptibility genes will most likely be identified in the future.  Therefore, I encouraged Crystal to contact me annually or if there are changes in her personal or family history.  This may change how we assess her cancer risk, screening, and the testing we would offer.    Plan:  1. A copy of the test results will be provided to Crystal.  2. She plans to follow-up with her primary care provider to discuss breast screening options.  A referral for high risk breast screening is available per request.   3. She should contact me annually, or sooner if her family history changes.    If Crystal has any further questions, I encouraged her to contact me at [phone].    Time spent on video call: 5 minutes.    Yarely Hunt MS, [facility] [place]  Licensed, Certified Genetic Counselor  [facility]                    Again, thank you for allowing me to participate in the care of your patient.        Sincerely,        Yarely Hunt GC

## 2021-08-04 NOTE — PROGRESS NOTES
"8/4/2021    Referring Provider: Crystal Abarca PA-C    Presenting Information:  I spoke to Crystal by phone today to discuss her genetic testing results as part of the Cancer Risk Management Program.  She was seen for genetic counseling 6/21/2021, and genetic testing for the CancerNext panel was sent to [facility].  Results from this test were discussed today.    Genetic Testing Result: NEGATIVE  Crystal is negative for mutations in the 36 genes tested.  No pathogenic mutations, variants of unknown significance, or gross deletions or duplications were detected.     Genes Analyzed (36 total): APC, WILFRID, AXIN2, BARD1, BMPR1A, BRCA1, BRCA2, BRIP1, CDH1, CDK4, CDKN2A, CHEK2, DICER1, MLH1, MSH2, MSH3, MSH6, MUTYH, NBN, NF1, NTHL1, PALB2, PMS2, PTEN, RAD51C, RAD51D, RECQL, SMAD4, SMARCA4, STK11 and TP53 (sequencing and deletion/duplication); HOXB13, POLD1 and POLE (sequencing only); EPCAM and GREM1 (deletion/duplication only).    A copy of the test report can be found in the Laboratory tab, dated 7/10/2021, and named \"LAB MISC ORDER\". The report is scanned in as a linked document.    Interpretation:  We discussed several different interpretations of this negative test result.    1. One explanation may be that there is a different gene or combination of genes and environment that are associated with the cancers in this family.  2. It is possible that her mother, or other close relative, did have a mutation in one of these genes which Crystal did not inherit.  3. There is also a small possibility that there is a mutation in one of these genes, and the testing laboratory could not find it with their current testing methods.       Screening:  Based on this negative test result, it is important for Crystal and her relatives to refer back to the family history for appropriate cancer screening.      Based on her personal and family history, Crystal has a 23.9-25.3% lifetime risk of developing breast cancer based on the Cholo/IBISv8 models. As " such, Crystal meets current National Comprehensive Cancer Network (NCCN) guidelines for high risk breast screening. This includes annual breast MRI in addition to annual mammogram.  In addition, Crystal should be receiving clinical breast exams by her physician. We discussed that Crystal could participate in our Cancer Risk Management Program in which our Physician Assistant provides an individual screening plan and assists with medical management. Crystal shared that she plans to follow-up with her primary care provider to discuss breast screening options      Other population cancer screening options, such as those recommended by the American Cancer Society and the National Comprehensive Cancer Network (NCCN), are also appropriate for Crystal at this time. These screening recommendations may change if there are changes to Crystal's personal and/or family history of cancer. Final screening recommendations should be made by each individual's managing physician.    Inheritance:  We reviewed the autosomal dominant inheritance of the genes tested. We discussed that Crystal cannot/did not pass on an identifiable mutation in these genes to her children based on this test result.  Mutations in these genes do not skip generations.      Additional Testing Considerations:  Although Crystal's genetic testing result was negative, other relatives (such as Crystal's mother) may still carry a gene mutation associated with hereditary cancer. Genetic counseling is recommended for Crystal's close relatives to discuss genetic testing options.  If any of these relatives do pursue genetic testing, Crystal is encouraged to contact me so that we may review the impact of their test results on her.    Summary:  We do not have an explanation for Crystal's family history of cancer. While no genetic changes were identified, Crystal may still be at risk for certain cancers due to family history, environmental factors, or other genetic causes not identified by this test.  Because of that, it is  important that she continue with cancer screening based on her personal and family history as discussed above.    Genetic testing is rapidly advancing, and new cancer susceptibility genes will most likely be identified in the future.  Therefore, I encouraged Crystal to contact me annually or if there are changes in her personal or family history.  This may change how we assess her cancer risk, screening, and the testing we would offer.    Plan:  1. A copy of the test results will be provided to Crystal.  2. She plans to follow-up with her primary care provider to discuss breast screening options.  A referral for high risk breast screening is available per request.   3. She should contact me annually, or sooner if her family history changes.    If Crystal has any further questions, I encouraged her to contact me at [phone].    Time spent on video call: 5 minutes.    Yarely Hunt MS, [facility]  Licensed, Certified Genetic Counselor  [facility]

## 2021-08-04 NOTE — LETTER
Cancer Risk Management  Program Locations    [facility]  [facility]  Mailing Address  Cancer Risk Management Program  [facility]  [address]  [place] [address]    New patient appointments  [phone]  August 13, 2021    Crystal ASHLEY Marcus  [address] [address]      Dear Crystal,    It was a pleasure speaking with you on 8/4/2021. Here is a copy of the progress note from our discussion. If you have any additional questions, please feel free to call.    Referring Provider: Crystal Abarca PA-C    Presenting Information:  I spoke to Crystal by phone today to discuss her genetic testing results as part of the Cancer Risk Management Program.  She was seen for genetic counseling 6/21/2021, and genetic testing for the CancerNext panel was sent to [facility].  Results from this test were discussed today.    Genetic Testing Result: NEGATIVE  Crystal is negative for mutations in the 36 genes tested.  No pathogenic mutations, variants of unknown significance, or gross deletions or duplications were detected.     Genes Analyzed (36 total): APC, WILFRID, AXIN2, BARD1, BMPR1A, BRCA1, BRCA2, BRIP1, CDH1, CDK4, CDKN2A, CHEK2, DICER1, MLH1, MSH2, MSH3, MSH6, MUTYH, NBN, NF1, NTHL1, PALB2, PMS2, PTEN, RAD51C, RAD51D, RECQL, SMAD4, SMARCA4, STK11 and TP53 (sequencing and deletion/duplication); HOXB13, POLD1 and POLE (sequencing only); EPCAM and GREM1 (deletion/duplication only).    Interpretation:  We discussed several different interpretations of this negative test result.    1. One explanation may be that there is a different gene or combination of genes and environment that are associated with the cancers in this family.  2. It is possible that her mother, or other close relative, did have a mutation in one of these genes which Crystal did  not inherit.  3. There is also a small possibility that there is a mutation in one of these genes, and the testing laboratory could not find it with their current testing methods.       Screening:  Based on this negative test result, it is important for Crystal and her relatives to refer back to the family history for appropriate cancer screening.      Based on her personal and family history, Crystal has a 23.9-25.3% lifetime risk of developing breast cancer based on the Cholo/IBISv8 models. As such, Crystal meets current National Comprehensive Cancer Network (NCCN) guidelines for high risk breast screening. This includes annual breast MRI in addition to annual mammogram.  In addition, Crystal should be receiving clinical breast exams by her physician. We discussed that Crystal could participate in our Cancer Risk Management Program in which our Physician Assistant provides an individual screening plan and assists with medical management. Crystal shared that she plans to follow-up with her primary care provider to discuss breast screening options      Other population cancer screening options, such as those recommended by the American Cancer Society and the National Comprehensive Cancer Network (NCCN), are also appropriate for Crystal at this time. These screening recommendations may change if there are changes to Crystal's personal and/or family history of cancer. Final screening recommendations should be made by each individual's managing physician.    Inheritance:  We reviewed the autosomal dominant inheritance of the genes tested. We discussed that Crystal cannot/did not pass on an identifiable mutation in these genes to her children based on this test result.  Mutations in these genes do not skip generations.      Additional Testing Considerations:  Although Crystal's genetic testing result was negative, other relatives (such as Crystal's mother) may still carry a gene mutation associated with hereditary cancer. Genetic counseling is recommended for  Crystal's close relatives to discuss genetic testing options.  If any of these relatives do pursue genetic testing, Crystal is encouraged to contact me so that we may review the impact of their test results on her.    Summary:  We do not have an explanation for Crystal's family history of cancer. While no genetic changes were identified, Crystal may still be at risk for certain cancers due to family history, environmental factors, or other genetic causes not identified by this test.  Because of that, it is important that she continue with cancer screening based on her personal and family history as discussed above.    Genetic testing is rapidly advancing, and new cancer susceptibility genes will most likely be identified in the future.  Therefore, I encouraged Crystal to contact me annually or if there are changes in her personal or family history.  This may change how we assess her cancer risk, screening, and the testing we would offer.    Plan:  1. A copy of the test results will be provided to Crystal.  2. She plans to follow-up with her primary care provider to discuss breast screening options.  A referral for high risk breast screening is available per request.   3. She should contact me annually, or sooner if her family history changes.    If Crystal has any further questions, I encouraged her to contact me at [phone].    Yarely Hunt MS, [facility]  Licensed, Certified Genetic Counselor  [facility]

## 2021-09-18 ENCOUNTER — HEALTH MAINTENANCE LETTER (OUTPATIENT)
Age: 51
End: 2021-09-18

## 2021-12-09 DIAGNOSIS — F33.1 MODERATE RECURRENT MAJOR DEPRESSION (H): ICD-10-CM

## 2021-12-09 DIAGNOSIS — F41.1 GAD (GENERALIZED ANXIETY DISORDER): ICD-10-CM

## 2021-12-09 RX ORDER — DESVENLAFAXINE 50 MG/1
TABLET, FILM COATED, EXTENDED RELEASE ORAL
Qty: 90 TABLET | Refills: 1 | Status: SHIPPED | OUTPATIENT
Start: 2021-12-09 | End: 2022-06-14

## 2022-04-30 ENCOUNTER — HEALTH MAINTENANCE LETTER (OUTPATIENT)
Age: 52
End: 2022-04-30

## 2022-06-10 DIAGNOSIS — F33.1 MODERATE RECURRENT MAJOR DEPRESSION (H): ICD-10-CM

## 2022-06-10 DIAGNOSIS — F41.1 GAD (GENERALIZED ANXIETY DISORDER): ICD-10-CM

## 2022-06-13 NOTE — TELEPHONE ENCOUNTER
"Requested Prescriptions   Pending Prescriptions Disp Refills    desvenlafaxine (PRISTIQ) 50 MG 24 hr tablet [Pharmacy Med Name: DESVENLAFAXINE SUCCNT ER 50 MG] 90 tablet 1     Sig: TAKE 1 TABLET BY MOUTH EVERY DAY        Serotonin-Norepinephrine Reuptake Inhibitors  Failed - 6/10/2022 12:24 AM        Failed - Blood pressure under 140/90 in past 12 months       BP Readings from Last 3 Encounters:   03/19/21 116/73   03/12/20 114/80   12/15/19 130/87                 Failed - PHQ-9 score of less than 5 in past 6 months     Please review last PHQ-9 score.           Failed - Normal serum creatinine on file in past 12 months     Recent Labs   Lab Test 01/11/16  1552   CR 0.75       Ok to refill medication if creatinine is low          Failed - Recent (6 mo) or future (30 days) visit within the authorizing provider's specialty     Patient had office visit in the last 6 months or has a visit in the next 30 days with authorizing provider or within the authorizing provider's specialty.  See \"Patient Info\" tab in inbasket, or \"Choose Columns\" in Meds & Orders section of the refill encounter.            Passed - Medication is active on med list        Passed - Patient is age 18 or older        Passed - No active pregnancy on record        Passed - No positive pregnancy test in past 12 months              "

## 2022-06-14 RX ORDER — DESVENLAFAXINE 50 MG/1
TABLET, FILM COATED, EXTENDED RELEASE ORAL
Qty: 90 TABLET | Refills: 0 | Status: SHIPPED | OUTPATIENT
Start: 2022-06-14 | End: 2022-06-17

## 2022-06-15 NOTE — PROGRESS NOTES
Crystal is a 51 year old who is being evaluated via a billable video visit.      How would you like to obtain your AVS? MyChart  If the video visit is dropped, the invitation should be resent by: Text to cell phone: [phone]  Will anyone else be joining your video visit? No        Assessment & Plan   Problem List Items Addressed This Visit     Moderate recurrent major depression (H)    Relevant Medications    desvenlafaxine (PRISTIQ) 50 MG 24 hr tablet      Other Visit Diagnoses     Visit for screening mammogram        Relevant Orders    MA SCREENING DIGITAL BILAT - Future  (s+30)    LIDIA (generalized anxiety disorder)        Relevant Medications    desvenlafaxine (PRISTIQ) 50 MG 24 hr tablet             Return in about 4 weeks (around 7/15/2022) for Physical Exam, Lab Work.    Crystal Samanta Abarca PA-C  [facility]   Crystal is a 51 year old accompanied by her Self., presenting for the following health issues:  Recheck Medication and Health Maintenance      HPI     Anxiety Follow-Up    How are you doing with your anxiety since your last visit? Stable    Are you having other symptoms that might be associated with anxiety? No    Have you had a significant life event? OTHER: Per pt is going through seperation and changes in job.     Are you feeling depressed? No    Do you have any concerns with your use of alcohol or other drugs? No    50 mg pristiq once daily. Working overall well for her.   Side effects-Some constipation from it and lower sex drive. Symptoms if she doesnt take it also.     Denies suicidal or homicidal thoughts.  Patient instructed to go to the emergency room or call 911 if these occur.     from her  about a month ago.   Goes to [facility] for therapy.      Has physical scheduled in july.     Social History     Tobacco Use     Smoking status: Former Smoker     Packs/day: 0.20     Years: 12.00     Pack years: 2.40     Types: Cigarettes     Start date:  1988     Quit date: 2000     Years since quittin.8     Smokeless tobacco: Never Used   Substance Use Topics     Alcohol use: Yes     Comment: occ     Drug use: No     LIDIA-7 SCORE 2019 3/12/2020 3/19/2021   Total Score - - -   Total Score 1 0 0     PHQ 2019 3/12/2020 3/19/2021   PHQ-9 Total Score 0 2 0   Q9: Thoughts of better off dead/self-harm past 2 weeks Not at all Not at all Not at all     Last PHQ-9 2022   1.  Little interest or pleasure in doing things 1   2.  Feeling down, depressed, or hopeless 1   3.  Trouble falling or staying asleep, or sleeping too much 0   4.  Feeling tired or having little energy 0   5.  Poor appetite or overeating 0   6.  Feeling bad about yourself 1   7.  Trouble concentrating 1   8.  Moving slowly or restless 0   Q9: Thoughts of better off dead/self-harm past 2 weeks 0   PHQ-9 Total Score 4   Difficulty at work, home, or with people Somewhat difficult     LIDIA-7  2022   1. Feeling nervous, anxious, or on edge 1   2. Not being able to stop or control worrying 1   3. Worrying too much about different things 1   4. Trouble relaxing 0   5. Being so restless that it is hard to sit still 0   6. Becoming easily annoyed or irritable 0   7. Feeling afraid, as if something awful might happen 0   LIDIA-7 Total Score 3   If you checked any problems, how difficult have they made it for you to do your work, take care of things at home, or get along with other people? Somewhat difficult           Review of Systems   Constitutional, HEENT, cardiovascular, pulmonary, GI, , musculoskeletal, neuro, skin, endocrine and psych systems are negative, except as otherwise noted.      Objective           Vitals:  No vitals were obtained today due to virtual visit.    Physical Exam   GENERAL: Healthy, alert and no distress  EYES: Eyes grossly normal to inspection.  No discharge or erythema, or obvious scleral/conjunctival abnormalities.  RESP: No audible wheeze, cough, or  visible cyanosis.  No visible retractions or increased work of breathing.    SKIN: Visible skin clear. No significant rash, abnormal pigmentation or lesions.  NEURO: Cranial nerves grossly intact.  Mentation and speech appropriate for age.  PSYCH: Mentation appears normal, affect normal/bright, judgement and insight intact, normal speech and appearance well-groomed.          Video-Visit Details    Video Start Time: 3:10 PM    Type of service:  Video Visit    Video End Time:3:18 PM    Originating Location (pt. Location): Home    Distant Location (provider location):  [facility]     Platform used for Video Visit: [employer]  Flavio.

## 2022-06-17 ENCOUNTER — VIRTUAL VISIT (OUTPATIENT)
Dept: FAMILY MEDICINE | Facility: CLINIC | Age: 52
End: 2022-06-17
Payer: COMMERCIAL

## 2022-06-17 DIAGNOSIS — F33.1 MODERATE RECURRENT MAJOR DEPRESSION (H): ICD-10-CM

## 2022-06-17 DIAGNOSIS — Z12.31 VISIT FOR SCREENING MAMMOGRAM: ICD-10-CM

## 2022-06-17 DIAGNOSIS — F41.1 GAD (GENERALIZED ANXIETY DISORDER): ICD-10-CM

## 2022-06-17 PROCEDURE — 99213 OFFICE O/P EST LOW 20 MIN: CPT | Mod: GT | Performed by: PHYSICIAN ASSISTANT

## 2022-06-17 RX ORDER — DESVENLAFAXINE 50 MG/1
50 TABLET, FILM COATED, EXTENDED RELEASE ORAL DAILY
Qty: 90 TABLET | Refills: 3 | Status: SHIPPED | OUTPATIENT
Start: 2022-06-17 | End: 2023-07-27

## 2022-06-17 ASSESSMENT — ANXIETY QUESTIONNAIRES
6. BECOMING EASILY ANNOYED OR IRRITABLE: NOT AT ALL
1. FEELING NERVOUS, ANXIOUS, OR ON EDGE: SEVERAL DAYS
GAD7 TOTAL SCORE: 3
3. WORRYING TOO MUCH ABOUT DIFFERENT THINGS: SEVERAL DAYS
IF YOU CHECKED OFF ANY PROBLEMS ON THIS QUESTIONNAIRE, HOW DIFFICULT HAVE THESE PROBLEMS MADE IT FOR YOU TO DO YOUR WORK, TAKE CARE OF THINGS AT HOME, OR GET ALONG WITH OTHER PEOPLE: SOMEWHAT DIFFICULT
GAD7 TOTAL SCORE: 3
5. BEING SO RESTLESS THAT IT IS HARD TO SIT STILL: NOT AT ALL
7. FEELING AFRAID AS IF SOMETHING AWFUL MIGHT HAPPEN: NOT AT ALL
2. NOT BEING ABLE TO STOP OR CONTROL WORRYING: SEVERAL DAYS

## 2022-06-17 ASSESSMENT — PATIENT HEALTH QUESTIONNAIRE - PHQ9
5. POOR APPETITE OR OVEREATING: NOT AT ALL
SUM OF ALL RESPONSES TO PHQ QUESTIONS 1-9: 4

## 2022-06-25 ENCOUNTER — HEALTH MAINTENANCE LETTER (OUTPATIENT)
Age: 52
End: 2022-06-25

## 2022-08-03 NOTE — PATIENT INSTRUCTIONS
Call [facility] to schedule breast imaging.  Call .    ? Atopic dermatitis (eczema) versus psoriasis schedule with dermatology and bring photos from rash      Lifestyle recommendations:  Being overweight or obese puts you are risk of major health problems including but not limited to: heart disease/heart attack, stroke, high cholesterol, high blood pressure, and diabetes.  This is why it is important to be at a healthy weight for your height.     Exercise 30 minutes 3-5 times a week, if you can only do 10 minutes 3 times a week that is still shown to have great benefit!  Brisk walking even counts for this.  Consider free youtube videos for exercise that fits your needs and lifestyle.     Monitor your caffeine and soda intake, try to minimize these beverages    Drink plenty of water (about 70-80 ounces a day)    Try to eat a vegetable and fruit  with lunch and dinner.  Have a breakfast that contains protein such as eggs or oatmeal.  Decrease your white bread, pasta, and sweets intake.  Increase lean proteins like chicken or pork. Try to eat out 1-2 times a week or less.  Monitor your portion sizes, try using smaller plates if needed.  Eat slowly, this gives you time to be aware that your body is full.   Let me know at any time if you would like a referral to a nutritionist!      Preventive Health Recommendations  Female Ages 50 - 64    Yearly exam: See your health care provider every year in order to  Review health changes.   Discuss preventive care.    Review your medicines if your doctor has prescribed any.    Get a Pap test every three years (unless you have an abnormal result and your provider advises testing more often).  If you get Pap tests with HPV test, you only need to test every 5 years, unless you have an abnormal result.   You do not need a Pap test if your uterus was removed (hysterectomy) and you have not had cancer.  You should be tested each year for STDs (sexually transmitted  diseases) if you're at risk.   Have a mammogram every 1 to 2 years.  Have a colonoscopy at age 50, or have a yearly FIT test (stool test). These exams screen for colon cancer.    Have a cholesterol test every 5 years, or more often if advised.  Have a diabetes test (fasting glucose) every three years. If you are at risk for diabetes, you should have this test more often.   If you are at risk for osteoporosis (brittle bone disease), think about having a bone density scan (DEXA).    Shots: Get a flu shot each year. Get a tetanus shot every 10 years.    Nutrition:   Eat at least 5 servings of fruits and vegetables each day.  Eat whole-grain bread, whole-wheat pasta and brown rice instead of white grains and rice.  Get adequate Calcium and Vitamin D.     Lifestyle  Exercise at least 150 minutes a week (30 minutes a day, 5 days a week). This will help you control your weight and prevent disease.  Limit alcohol to one drink per day.  No smoking.   Wear sunscreen to prevent skin cancer.   See your dentist every six months for an exam and cleaning.  See your eye doctor every 1 to 2 years.

## 2022-08-03 NOTE — PROGRESS NOTES
SUBJECTIVE:   CC: Crystal Marcus is an 51 year old woman who presents for preventive health visit.     Patient has been advised of split billing requirements and indicates understanding: Yes     Colon screening --no FH of this. Would like to do cologuard.   mammo due.     Pt is fasting for labs.  S/p hysterectomy. paps no longer indicated.   Still has bilateral breast pain off and on. Had diagnostic mammogram last year.       - Discuss L foot issue on big toe, pain and discomfort. No known injury on foot or toe. Top of big left toe feels numb for about 6 months. No pain. No progression of symptoms.     -  L knee itching/rash per pt on and off for a while now. Would like to have looked at.    Back of left knee, front of thigh, round lesions with central white scale. Itchy. Has been getting them for about 10 years. Has tried cortisone cream and anti-fungal nothing really helps.     Heartburn-tums doesnt help much. Not tried a PPI.     Healthy Habits:     Getting at least 3 servings of Calcium per day:  Yes    Bi-annual eye exam:  NO    Dental care twice a year:  Yes    Sleep apnea or symptoms of sleep apnea:  None    Diet:  Regular (no restrictions)    Frequency of exercise:  2-3 days/week    Duration of exercise:  15-30 minutes    Taking medications regularly:  Yes    Medication side effects:  None    PHQ-2 Total Score: 0    Additional concerns today:  No           Today's PHQ-2 Score:   PHQ-2 ( 1999 Pf[employer]) 6/17/2022   Q1: Little interest or pleasure in doing things 1   Q2: Feeling down, depressed or hopeless 1   PHQ-2 Score 2   PHQ-2 Total Score (12-17 Years)- Positive if 3 or more points; Administer PHQ-A if positive -   Q1: Little interest or pleasure in doing things -   Q2: Feeling down, depressed or hopeless -   PHQ-2 Score -       Abuse: Current or Past (Physical, Sexual or Emotional) - No  Do you feel safe in your environment? Yes        Social History     Tobacco Use     Smoking status: Former Smoker      Packs/day: 0.20     Years: 12.00     Pack years: 2.40     Types: Cigarettes     Start date: 1988     Quit date: 2000     Years since quittin.9     Smokeless tobacco: Never Used   Substance Use Topics     Alcohol use: Yes     Comment: occ     If you drink alcohol do you typically have >3 drinks per day or >7 drinks per week? No    Alcohol Use 3/19/2021   Prescreen: >3 drinks/day or >7 drinks/week? No   Prescreen: >3 drinks/day or >7 drinks/week? -   No flowsheet data found.    Reviewed orders with patient.  Reviewed health maintenance and updated orders accordingly - Yes      Breast Cancer Screening:    FHS-7:   Breast CA Risk Assessment (FHS-7) 3/19/2021   Did any of your first-degree relatives have breast or ovarian cancer? Yes   Did any of your relatives have bilateral breast cancer? No   Did any man in your family have breast cancer? No   Did any woman in your family have breast and ovarian cancer? No   Did any woman in your family have breast cancer before age 50 y? Yes   Do you have 2 or more relatives with breast and/or ovarian cancer? Yes   Do you have 2 or more relatives with breast and/or bowel cancer? No     Genetic screening --did this and they found nothing.     click delete button to remove this line now    Pertinent mammograms are reviewed under the imaging tab.      PAP / HPV 2011   PAP (Historical) NIL     Reviewed and updated as needed this visit by clinical staff                    Reviewed and updated as needed this visit by Provider                       Review of Systems   Constitutional: Negative for chills and fever.   HENT: Negative for congestion, ear pain, hearing loss and sore throat.    Eyes: Negative for pain and visual disturbance.   Respiratory: Negative for cough and shortness of breath.    Cardiovascular: Negative for chest pain, palpitations and peripheral edema.   Gastrointestinal: Negative for abdominal pain, constipation, diarrhea, heartburn, hematochezia and  "nausea.   Breasts:  Positive for tenderness. Negative for breast mass and discharge.   Genitourinary: Negative for dysuria, frequency, genital sores, hematuria, pelvic pain, urgency, vaginal bleeding and vaginal discharge.   Musculoskeletal: Negative for arthralgias, joint swelling and myalgias.   Skin: Negative for rash.   Neurological: Negative for dizziness, weakness, headaches and paresthesias.   Psychiatric/Behavioral: Negative for mood changes. The patient is not nervous/anxious.         OBJECTIVE:   /79   Pulse 82   Temp 98.1  F (36.7  C) (Tympanic)   Resp 16   Ht 1.702 m (5' 7\")   Wt 85.3 kg (188 lb)   LMP 12/07/2015   SpO2 98%   Breastfeeding No   BMI 29.44 kg/m    Physical Exam  GENERAL: healthy, alert and no distress  EYES: Eyes grossly normal to inspection, PERRL and conjunctivae and sclerae normal  HENT: ear canals and TM's normal, nose and mouth without ulcers or lesions  NECK: no adenopathy, no asymmetry, masses, or scars and thyroid normal to palpation  RESP: lungs clear to auscultation - no rales, rhonchi or wheezes  BREAST: normal without masses, tenderness or nipple discharge and no palpable axillary masses or adenopathy  CV: regular rate and rhythm, normal S1 S2, no S3 or S4, no murmur, click or rub, no peripheral edema and peripheral pulses strong  ABDOMEN: soft, nontender, no hepatosplenomegaly, no masses and bowel sounds normal  MS: no gross musculoskeletal defects noted, no edema  SKIN: {:back of left knee- two large annular pink plaques with white scale making a border.   NEURO: Normal strength and tone, mentation intact and speech normal  PSYCH: mentation appears normal, affect normal/bright    Diagnostic Test Results:  Labs reviewed in Epic    ASSESSMENT/PLAN:   (Z00.00) Routine general medical examination at a health care facility  (primary encounter diagnosis)  Comment:   Plan:     (N64.4) Breast pain  Comment: ongoing, will try heat. Will get imaging as well has been " normal so far  Plan: MA Diagnostic Digital Bilateral            (R20.0) Numbness of toes  Comment: wearing ballet flats today, encouraged use of supportive shoes. Refer to podiatry likely irritated nerve  Plan: Orthopedic  Referral            (R12) Heartburn  Comment:   Plan: pantoprazole (PROTONIX) 20 MG EC tablet  F/u 6-8 weeks if not gone sooner if not better    (L30.9) Dermatitis  Comment: see below  Plan: Adult Dermatology Referral, clobetasol         (TEMOVATE) 0.05 % external cream            (E78.49) Other hyperlipidemia  Comment:   Plan: Lipid panel reflex to direct LDL Fasting            (Z12.11) Screen for colon cancer  Comment:   Plan: COLOGUARD(EXACT SCIENCES)            (Z13.1) Screening for diabetes mellitus  Comment:   Plan: Comprehensive metabolic panel (BMP + Alb, Alk         Phos, ALT, AST, Total. Bili, TP)          Patient Instructions   Call [facility] to schedule breast imaging.  Call .    ? Atopic dermatitis (eczema) versus psoriasis schedule with dermatology and bring photos from rash      Lifestyle recommendations:  Being overweight or obese puts you are risk of major health problems including but not limited to: heart disease/heart attack, stroke, high cholesterol, high blood pressure, and diabetes.  This is why it is important to be at a healthy weight for your height.     Exercise 30 minutes 3-5 times a week, if you can only do 10 minutes 3 times a week that is still shown to have great benefit!  Brisk walking even counts for this.  Consider free youtube videos for exercise that fits your needs and lifestyle.     Monitor your caffeine and soda intake, try to minimize these beverages    Drink plenty of water (about 70-80 ounces a day)    Try to eat a vegetable and fruit  with lunch and dinner.  Have a breakfast that contains protein such as eggs or oatmeal.  Decrease your white bread, pasta, and sweets intake.  Increase lean proteins like chicken or pork.  Try to eat out 1-2 times a week or less.  Monitor your portion sizes, try using smaller plates if needed.  Eat slowly, this gives you time to be aware that your body is full.   Let me know at any time if you would like a referral to a nutritionist!      Preventive Health Recommendations  Female Ages 50 - 64    Yearly exam: See your health care provider every year in order to  o Review health changes.   o Discuss preventive care.    o Review your medicines if your doctor has prescribed any.      Get a Pap test every three years (unless you have an abnormal result and your provider advises testing more often).    If you get Pap tests with HPV test, you only need to test every 5 years, unless you have an abnormal result.     You do not need a Pap test if your uterus was removed (hysterectomy) and you have not had cancer.    You should be tested each year for STDs (sexually transmitted diseases) if you're at risk.     Have a mammogram every 1 to 2 years.    Have a colonoscopy at age 50, or have a yearly FIT test (stool test). These exams screen for colon cancer.      Have a cholesterol test every 5 years, or more often if advised.    Have a diabetes test (fasting glucose) every three years. If you are at risk for diabetes, you should have this test more often.     If you are at risk for osteoporosis (brittle bone disease), think about having a bone density scan (DEXA).    Shots: Get a flu shot each year. Get a tetanus shot every 10 years.    Nutrition:     Eat at least 5 servings of fruits and vegetables each day.    Eat whole-grain bread, whole-wheat pasta and brown rice instead of white grains and rice.    Get adequate Calcium and Vitamin D.     Lifestyle    Exercise at least 150 minutes a week (30 minutes a day, 5 days a week). This will help you control your weight and prevent disease.    Limit alcohol to one drink per day.    No smoking.     Wear sunscreen to prevent skin cancer.     See your dentist every six  "months for an exam and cleaning.    See your eye doctor every 1 to 2 years.        Billin min spent on patient today including chart review, history, exam, and explaining treatment plan and follow-up.         Patient has been advised of split billing requirements and indicates understanding: Yes    COUNSELING:  Reviewed preventive health counseling, as reflected in patient instructions       Regular exercise       Healthy diet/nutrition    Estimated body mass index is 27.72 kg/m  as calculated from the following:    Height as of 3/19/21: 1.702 m (5' 7\").    Weight as of 3/19/21: 80.3 kg (177 lb).    Weight management plan: given handout    She reports that she quit smoking about 21 years ago. Her smoking use included cigarettes. She started smoking about 34 years ago. She has a 2.40 pack-year smoking history. She has never used smokeless tobacco.    Patient Instructions   [facility] to schedule breast imaging.  Call .    ? Atopic dermatitis (eczema) versus psoriasis schedule with dermatology and bring photos from rash      Lifestyle recommendations:  Being overweight or obese puts you are risk of major health problems including but not limited to: heart disease/heart attack, stroke, high cholesterol, high blood pressure, and diabetes.  This is why it is important to be at a healthy weight for your height.     Exercise 30 minutes 3-5 times a week, if you can only do 10 minutes 3 times a week that is still shown to have great benefit!  Brisk walking even counts for this.  Consider free youtube videos for exercise that fits your needs and lifestyle.     Monitor your caffeine and soda intake, try to minimize these beverages    Drink plenty of water (about 70-80 ounces a day)    Try to eat a vegetable and fruit  with lunch and dinner.  Have a breakfast that contains protein such as eggs or oatmeal.  Decrease your white bread, pasta, and sweets intake.  Increase lean proteins like chicken " or pork. Try to eat out 1-2 times a week or less.  Monitor your portion sizes, try using smaller plates if needed.  Eat slowly, this gives you time to be aware that your body is full.   Let me know at any time if you would like a referral to a nutritionist!      Preventive Health Recommendations  Female Ages 50 - 64    Yearly exam: See your health care provider every year in order to  o Review health changes.   o Discuss preventive care.    o Review your medicines if your doctor has prescribed any.      Get a Pap test every three years (unless you have an abnormal result and your provider advises testing more often).    If you get Pap tests with HPV test, you only need to test every 5 years, unless you have an abnormal result.     You do not need a Pap test if your uterus was removed (hysterectomy) and you have not had cancer.    You should be tested each year for STDs (sexually transmitted diseases) if you're at risk.     Have a mammogram every 1 to 2 years.    Have a colonoscopy at age 50, or have a yearly FIT test (stool test). These exams screen for colon cancer.      Have a cholesterol test every 5 years, or more often if advised.    Have a diabetes test (fasting glucose) every three years. If you are at risk for diabetes, you should have this test more often.     If you are at risk for osteoporosis (brittle bone disease), think about having a bone density scan (DEXA).    Shots: Get a flu shot each year. Get a tetanus shot every 10 years.    Nutrition:     Eat at least 5 servings of fruits and vegetables each day.    Eat whole-grain bread, whole-wheat pasta and brown rice instead of white grains and rice.    Get adequate Calcium and Vitamin D.     Lifestyle    Exercise at least 150 minutes a week (30 minutes a day, 5 days a week). This will help you control your weight and prevent disease.    Limit alcohol to one drink per day.    No smoking.     Wear sunscreen to prevent skin cancer.     See your dentist every  six months for an exam and cleaning.    See your eye doctor every 1 to 2 years.      Counseling Resources:  ATP IV Guidelines  Pooled Cohorts Equation Calculator  Breast Cancer Risk Calculator  BRCA-Related Cancer Risk Assessment: FHS-7 Tool  FRAX Risk Assessment  ICSI Preventive Guidelines  Dietary Guidelines for Americans, 2010  USDA's MyPlate  ASA Prophylaxis  Lung CA Screening    [facility]

## 2022-08-11 ENCOUNTER — OFFICE VISIT (OUTPATIENT)
Dept: FAMILY MEDICINE | Facility: CLINIC | Age: 52
End: 2022-08-11
Payer: COMMERCIAL

## 2022-08-11 VITALS
TEMPERATURE: 98.1 F | HEIGHT: 67 IN | HEART RATE: 82 BPM | BODY MASS INDEX: 29.51 KG/M2 | SYSTOLIC BLOOD PRESSURE: 112 MMHG | DIASTOLIC BLOOD PRESSURE: 79 MMHG | WEIGHT: 188 LBS | RESPIRATION RATE: 16 BRPM | OXYGEN SATURATION: 98 %

## 2022-08-11 DIAGNOSIS — Z00.00 ROUTINE GENERAL MEDICAL EXAMINATION AT A HEALTH CARE FACILITY: Primary | ICD-10-CM

## 2022-08-11 DIAGNOSIS — Z12.11 SCREEN FOR COLON CANCER: ICD-10-CM

## 2022-08-11 DIAGNOSIS — R20.0 NUMBNESS OF TOES: ICD-10-CM

## 2022-08-11 DIAGNOSIS — E78.49 OTHER HYPERLIPIDEMIA: ICD-10-CM

## 2022-08-11 DIAGNOSIS — R12 HEARTBURN: ICD-10-CM

## 2022-08-11 DIAGNOSIS — Z13.1 SCREENING FOR DIABETES MELLITUS: ICD-10-CM

## 2022-08-11 DIAGNOSIS — L30.9 DERMATITIS: ICD-10-CM

## 2022-08-11 DIAGNOSIS — N64.4 BREAST PAIN: ICD-10-CM

## 2022-08-11 LAB
ALBUMIN SERPL-MCNC: 3.3 G/DL (ref 3.4–5)
ALP SERPL-CCNC: 57 U/L (ref 40–150)
ALT SERPL W P-5'-P-CCNC: 22 U/L (ref 0–50)
ANION GAP SERPL CALCULATED.3IONS-SCNC: 4 MMOL/L (ref 3–14)
AST SERPL W P-5'-P-CCNC: 18 U/L (ref 0–45)
BILIRUB SERPL-MCNC: 0.2 MG/DL (ref 0.2–1.3)
BUN SERPL-MCNC: 10 MG/DL (ref 7–30)
CALCIUM SERPL-MCNC: 8.8 MG/DL (ref 8.5–10.1)
CHLORIDE BLD-SCNC: 105 MMOL/L (ref 94–109)
CHOLEST SERPL-MCNC: 265 MG/DL
CO2 SERPL-SCNC: 27 MMOL/L (ref 20–32)
CREAT SERPL-MCNC: 0.71 MG/DL (ref 0.52–1.04)
FASTING STATUS PATIENT QL REPORTED: YES
GFR SERPL CREATININE-BSD FRML MDRD: >90 ML/MIN/1.73M2
GLUCOSE BLD-MCNC: 103 MG/DL (ref 70–99)
HDLC SERPL-MCNC: 48 MG/DL
LDLC SERPL CALC-MCNC: 178 MG/DL
NONHDLC SERPL-MCNC: 217 MG/DL
POTASSIUM BLD-SCNC: 4.1 MMOL/L (ref 3.4–5.3)
PROT SERPL-MCNC: 6.6 G/DL (ref 6.8–8.8)
SODIUM SERPL-SCNC: 136 MMOL/L (ref 133–144)
TRIGL SERPL-MCNC: 193 MG/DL

## 2022-08-11 PROCEDURE — 99214 OFFICE O/P EST MOD 30 MIN: CPT | Mod: 25 | Performed by: PHYSICIAN ASSISTANT

## 2022-08-11 PROCEDURE — 36415 COLL VENOUS BLD VENIPUNCTURE: CPT | Performed by: PHYSICIAN ASSISTANT

## 2022-08-11 PROCEDURE — 80061 LIPID PANEL: CPT | Performed by: PHYSICIAN ASSISTANT

## 2022-08-11 PROCEDURE — 99396 PREV VISIT EST AGE 40-64: CPT | Performed by: PHYSICIAN ASSISTANT

## 2022-08-11 PROCEDURE — 80053 COMPREHEN METABOLIC PANEL: CPT | Performed by: PHYSICIAN ASSISTANT

## 2022-08-11 RX ORDER — CLOBETASOL PROPIONATE 0.5 MG/G
CREAM TOPICAL 2 TIMES DAILY
Qty: 60 G | Refills: 0 | Status: SHIPPED | OUTPATIENT
Start: 2022-08-11 | End: 2023-02-27

## 2022-08-11 RX ORDER — PANTOPRAZOLE SODIUM 20 MG/1
20 TABLET, DELAYED RELEASE ORAL DAILY
Qty: 30 TABLET | Refills: 1 | Status: SHIPPED | OUTPATIENT
Start: 2022-08-11 | End: 2022-10-06

## 2022-08-11 ASSESSMENT — ENCOUNTER SYMPTOMS
HEARTBURN: 0
NERVOUS/ANXIOUS: 0
FEVER: 0
DYSURIA: 0
JOINT SWELLING: 0
EYE PAIN: 0
FREQUENCY: 0
HEADACHES: 0
CONSTIPATION: 0
BREAST MASS: 0
CHILLS: 0
ABDOMINAL PAIN: 0
HEMATOCHEZIA: 0
PARESTHESIAS: 0
SORE THROAT: 0
ARTHRALGIAS: 0
DIZZINESS: 0
WEAKNESS: 0
SHORTNESS OF BREATH: 0
NAUSEA: 0
HEMATURIA: 0
COUGH: 0
PALPITATIONS: 0
MYALGIAS: 0
DIARRHEA: 0

## 2022-08-12 NOTE — RESULT ENCOUNTER NOTE
Veronika Rojas,       Your recent test results are attached, if you have any questions or concerns please feel free to contact me via e-mail or call [phone].      Cholesterol still high but not yet to the point you need medication. Would recheck yearly. Blood sugar slightly elevated. Kidney and liver tests normal.        It was a pleasure to see you at your recent office visit.      Sincerely,  Crystal Abarca PA-C

## 2022-08-25 LAB — NONINV COLON CA DNA+OCC BLD SCRN STL QL: NEGATIVE

## 2022-08-25 NOTE — RESULT ENCOUNTER NOTE
Veronika Rojas,       Your recent test results are attached, if you have any questions or concerns please feel free to contact me via e-mail or call [phone].  Negative colon cancer screening.        It was a pleasure to see you at your recent office visit.      Sincerely,  Crystal Abarca PA-C

## 2022-08-29 ENCOUNTER — ANCILLARY PROCEDURE (OUTPATIENT)
Dept: MAMMOGRAPHY | Facility: CLINIC | Age: 52
End: 2022-08-29
Attending: PHYSICIAN ASSISTANT
Payer: COMMERCIAL

## 2022-08-29 DIAGNOSIS — N64.4 BREAST PAIN: ICD-10-CM

## 2022-08-29 PROCEDURE — G0279 TOMOSYNTHESIS, MAMMO: HCPCS | Performed by: RADIOLOGY

## 2022-08-29 PROCEDURE — 77066 DX MAMMO INCL CAD BI: CPT | Performed by: RADIOLOGY

## 2022-09-06 ENCOUNTER — OFFICE VISIT (OUTPATIENT)
Dept: PODIATRY | Facility: CLINIC | Age: 52
End: 2022-09-06
Payer: COMMERCIAL

## 2022-09-06 VITALS — SYSTOLIC BLOOD PRESSURE: 116 MMHG | DIASTOLIC BLOOD PRESSURE: 82 MMHG | HEART RATE: 87 BPM

## 2022-09-06 DIAGNOSIS — R20.0 NUMBNESS OF TOES: ICD-10-CM

## 2022-09-06 PROCEDURE — 99203 OFFICE O/P NEW LOW 30 MIN: CPT | Performed by: PODIATRIST

## 2022-09-06 NOTE — LETTER
9/6/2022         RE: Crystal Marcus  [id number]  [place] [address]        Dear Colleague,    Thank you for referring your patient, Crystal Marcus, to the [facility] [place]. Please see a copy of my visit note below.    Subjective:    Pt is seen today in consult from Crystal Abarca and complains of left foot numbness for 6 months.  Points to dorsal first interspace.  Has no pain.  Denies weakness.    ROS:  See above         Allergies   Allergen Reactions     Sulfa Drugs Rash     SULFA       Current Outpatient Medications   Medication Sig Dispense Refill     clobetasol (TEMOVATE) 0.05 % external cream Apply topically 2 times daily To affected area until rash is gone. Follow up with dermatology. 60 g 0     desvenlafaxine (PRISTIQ) 50 MG 24 hr tablet Take 1 tablet (50 mg) by mouth daily 90 tablet 3     multivitamin, therapeutic with minerals (THERA-VIT-M) TABS Take 1 tablet by mouth daily       pantoprazole (PROTONIX) 20 MG EC tablet Take 1 tablet (20 mg) by mouth daily For 8 weeks be seen if symptoms come back after stopping 30 tablet 1       Patient Active Problem List   Diagnosis     FH: breast cancer in first degree relative     Moderate recurrent major depression (H)     Hot flashes     Vitamin D deficiency     Ureterocele, acquired     Other hyperlipidemia     Anxiety     Numbness of toes     Breast pain       Past Medical History:   Diagnosis Date     Arthritis 1/1/2006    X-rays show arthritis in my hips     Complex ovarian cyst 1/15/2015     Depressive disorder      Depressive disorder, not elsewhere classified      FH: breast cancer in first degree relative 8/30/2011     FH: breast cancer in first degree relative      Hydronephrosis 1/23/2015       Past Surgical History:   Procedure Laterality Date     BIOPSY  1995, 2015     DAVINCI HYSTERECTOMY TOTAL, SALPINGECTOMY BILATERAL Bilateral 12/11/2015    Procedure: DAVINCI HYSTERECTOMY TOTAL, SALPINGECTOMY BILATERAL;  Surgeon: Wolf  Dary Mcgrath MD;  Location: [facility]     HYSTERECTOMY, PAP NO LONGER INDICATED       SURGICAL HISTORY OF -       Cryotherapy     wisdom teeth removal         Family History   Problem Relation Age of Onset     Breast Cancer Mother      Lipids Mother      Hypertension Mother      Breast Cancer Maternal Grandmother      Hypertension Father      Seizure Disorder Brother        Social History     Tobacco Use     Smoking status: Former Smoker     Packs/day: 0.20     Years: 12.00     Pack years: 2.40     Types: Cigarettes     Start date: 1988     Quit date: 2000     Years since quittin.0     Smokeless tobacco: Never Used   Substance Use Topics     Alcohol use: Yes     Comment: occ         Exam:    Vitals: /82   Pulse 87   LMP 2015   BMI: There is no height or weight on file to calculate BMI.  Height: Data Unavailable    Constitutional/ general:  Pt is in no apparent distress, appears well-nourished.  Cooperative with history and physical exam.     Psych:  The patient answered questions appropriately.  Normal affect.  Seems to have reasonable expectations, in terms of treatment.     Lungs:  Non labored breathing, non labored speech. No cough.  No audible wheezing. Even, quiet breathing.       Vascular:  positive pedal pulses bilaterally for both the DP and PT arteries.  CFT < 3 sec.  negative ankle edema.  positive pedal hair growth.    Neuro:  Alert and oriented x 3. Coordinated gait.  Light touch sensation is intact on all toes but diminished in dorsal left interspace.  Muscle compartments strong.    Derm: Normal texture and turgor.  No erythema, ecchymosis, or cyanosis.      Musculoskeletal:     Patient is ambulatory without an assistive device or brace.   Cavus arch with weightbearing.  No forefoot or rear foot deformities noted.  MS 5/5 all compartments.  Normal ROM all fore foot and rearfoot joints.  No equinus.       Assessment: Left deep peroneal neuritis    Plan: Explained how cavus foot  with certain shoes cause pressure on dorsum of her foot.  Discussed course of nerve.  We will keep pressure off this nerve at all times.  Patient will ice to reduce inflammation.  She will make sure she does not cross legs.  Discussed importance of compliance was permanent numbness.  RTC PRN.  Thank you for allowing me participate in the care of this patient.        Yunior Everett DPM, FACFAS            Again, thank you for allowing me to participate in the care of your patient.        Sincerely,        Yunior Everett DPM

## 2022-09-06 NOTE — PROGRESS NOTES
Subjective:    Pt is seen today in consult from Crystal Abarca and complains of left foot numbness for 6 months.  Points to dorsal first interspace.  Has no pain.  Denies weakness.    ROS:  See above         Allergies   Allergen Reactions     Sulfa Drugs Rash     SULFA       Current Outpatient Medications   Medication Sig Dispense Refill     clobetasol (TEMOVATE) 0.05 % external cream Apply topically 2 times daily To affected area until rash is gone. Follow up with dermatology. 60 g 0     desvenlafaxine (PRISTIQ) 50 MG 24 hr tablet Take 1 tablet (50 mg) by mouth daily 90 tablet 3     multivitamin, therapeutic with minerals (THERA-VIT-M) TABS Take 1 tablet by mouth daily       pantoprazole (PROTONIX) 20 MG EC tablet Take 1 tablet (20 mg) by mouth daily For 8 weeks be seen if symptoms come back after stopping 30 tablet 1       Patient Active Problem List   Diagnosis     FH: breast cancer in first degree relative     Moderate recurrent major depression (H)     Hot flashes     Vitamin D deficiency     Ureterocele, acquired     Other hyperlipidemia     Anxiety     Numbness of toes     Breast pain       Past Medical History:   Diagnosis Date     Arthritis 1/1/2006    X-rays show arthritis in my hips     Complex ovarian cyst 1/15/2015     Depressive disorder      Depressive disorder, not elsewhere classified      FH: breast cancer in first degree relative 8/30/2011     FH: breast cancer in first degree relative      Hydronephrosis 1/23/2015       Past Surgical History:   Procedure Laterality Date     BIOPSY  1995, 2015     DAVINCI HYSTERECTOMY TOTAL, SALPINGECTOMY BILATERAL Bilateral 12/11/2015    Procedure: DAVINCI HYSTERECTOMY TOTAL, SALPINGECTOMY BILATERAL;  Surgeon: Dary Bloom MD;  Location: [facility]     HYSTERECTOMY, PAP NO LONGER INDICATED       SURGICAL HISTORY OF -   1990    Cryotherapy     wisdom teeth removal         Family History   Problem Relation Age of Onset     Breast Cancer Mother      Lipids Mother       Hypertension Mother      Breast Cancer Maternal Grandmother      Hypertension Father      Seizure Disorder Brother        Social History     Tobacco Use     Smoking status: Former Smoker     Packs/day: 0.20     Years: 12.00     Pack years: 2.40     Types: Cigarettes     Start date: 1988     Quit date: 2000     Years since quittin.0     Smokeless tobacco: Never Used   Substance Use Topics     Alcohol use: Yes     Comment: occ         Exam:    Vitals: /82   Pulse 87   LMP 2015   BMI: There is no height or weight on file to calculate BMI.  Height: Data Unavailable    Constitutional/ general:  Pt is in no apparent distress, appears well-nourished.  Cooperative with history and physical exam.     Psych:  The patient answered questions appropriately.  Normal affect.  Seems to have reasonable expectations, in terms of treatment.     Lungs:  Non labored breathing, non labored speech. No cough.  No audible wheezing. Even, quiet breathing.       Vascular:  positive pedal pulses bilaterally for both the DP and PT arteries.  CFT < 3 sec.  negative ankle edema.  positive pedal hair growth.    Neuro:  Alert and oriented x 3. Coordinated gait.  Light touch sensation is intact on all toes but diminished in dorsal left interspace.  Muscle compartments strong.    Derm: Normal texture and turgor.  No erythema, ecchymosis, or cyanosis.      Musculoskeletal:     Patient is ambulatory without an assistive device or brace.   Cavus arch with weightbearing.  No forefoot or rear foot deformities noted.  MS 5/5 all compartments.  Normal ROM all fore foot and rearfoot joints.  No equinus.       Assessment: Left deep peroneal neuritis    Plan: Explained how cavus foot with certain shoes cause pressure on dorsum of her foot.  Discussed course of nerve.  We will keep pressure off this nerve at all times.  Patient will ice to reduce inflammation.  She will make sure she does not cross legs.  Discussed importance of  compliance was permanent numbness.  RTC PRN.  Thank you for allowing me participate in the care of this patient.        Yunior Everett DPM, FACFAS

## 2022-10-06 DIAGNOSIS — R12 HEARTBURN: ICD-10-CM

## 2022-10-06 RX ORDER — PANTOPRAZOLE SODIUM 20 MG/1
20 TABLET, DELAYED RELEASE ORAL DAILY
Qty: 30 TABLET | Refills: 1 | Status: SHIPPED | OUTPATIENT
Start: 2022-10-06 | End: 2022-12-12

## 2022-10-17 ENCOUNTER — OFFICE VISIT (OUTPATIENT)
Dept: FAMILY MEDICINE | Facility: CLINIC | Age: 52
End: 2022-10-17
Payer: COMMERCIAL

## 2022-10-17 VITALS
SYSTOLIC BLOOD PRESSURE: 113 MMHG | BODY MASS INDEX: 29.17 KG/M2 | OXYGEN SATURATION: 97 % | TEMPERATURE: 97.9 F | DIASTOLIC BLOOD PRESSURE: 78 MMHG | HEART RATE: 74 BPM | WEIGHT: 186.25 LBS

## 2022-10-17 DIAGNOSIS — H61.23 BILATERAL IMPACTED CERUMEN: Primary | ICD-10-CM

## 2022-10-17 PROCEDURE — 69209 REMOVE IMPACTED EAR WAX UNI: CPT | Mod: 50 | Performed by: FAMILY MEDICINE

## 2022-10-17 ASSESSMENT — PAIN SCALES - GENERAL: PAINLEVEL: NO PAIN (0)

## 2022-10-17 NOTE — PROGRESS NOTES
Brittney Rojas is a 51 year old{  presenting for the following health issues:  Ear Problem      History of Present Illness       Reason for visit:  Ear Fluid Buildup    She eats 2-3 servings of fruits and vegetables daily.She consumes 0 sweetened beverage(s) daily.She exercises with enough effort to increase her heart rate 20 to 29 minutes per day.  She exercises with enough effort to increase her heart rate 4 days per week.   She is taking medications regularly.        Review of Systems   3 1/2 weeks ear plugged left     Now right also acting up    Not much pain    Not sick currently    covid 3 1/2 weeks ago  Two days of symptoms, slept for 1 1/2 days           Objective    /78 (BP Location: Left arm, Patient Position: Chair, Cuff Size: Adult Regular)   Pulse 74   Temp 97.9  F (36.6  C) (Temporal)   Wt 84.5 kg (186 lb 4 oz)   LMP 12/07/2015   SpO2 97%   BMI 29.17 kg/m    Body mass index is 29.17 kg/m .  Physical Exam    Full physical not done     Mentation and affect are fine    No tremor of speech or extremity    Tympanic membranes not seen at all due to fairly large amount of soft wax bilaterally    Not tender over sinuses     Oral mucosa fine    No submandib  tenderness         With consent we removed all the wax with gentle irrigation and curette bilaterally     Tympanic membranes and canals fine afterward    ASSESSMENT / PLAN:  (H61.23) Bilateral impacted cerumen  (primary encounter diagnosis)  Comment: removed here.  Good result.  Patient felt much better after.   Plan: REMOVE IMPACTED CERUMEN                   I reviewed the patient's medications, allergies, medical history, family history, and social history.    Raffaele Groves MD

## 2022-11-20 ENCOUNTER — HEALTH MAINTENANCE LETTER (OUTPATIENT)
Age: 52
End: 2022-11-20

## 2022-12-11 DIAGNOSIS — R12 HEARTBURN: ICD-10-CM

## 2022-12-12 RX ORDER — PANTOPRAZOLE SODIUM 20 MG/1
20 TABLET, DELAYED RELEASE ORAL DAILY
Qty: 30 TABLET | Refills: 1 | Status: SHIPPED | OUTPATIENT
Start: 2022-12-12 | End: 2023-02-07

## 2023-02-06 DIAGNOSIS — R12 HEARTBURN: ICD-10-CM

## 2023-02-07 RX ORDER — PANTOPRAZOLE SODIUM 20 MG/1
20 TABLET, DELAYED RELEASE ORAL DAILY
Qty: 30 TABLET | Refills: 1 | Status: SHIPPED | OUTPATIENT
Start: 2023-02-07 | End: 2023-04-18

## 2023-02-27 ENCOUNTER — OFFICE VISIT (OUTPATIENT)
Dept: DERMATOLOGY | Facility: CLINIC | Age: 53
End: 2023-02-27
Payer: COMMERCIAL

## 2023-02-27 DIAGNOSIS — L30.0 NUMMULAR DERMATITIS: Primary | ICD-10-CM

## 2023-02-27 DIAGNOSIS — L30.9 DERMATITIS: ICD-10-CM

## 2023-02-27 PROCEDURE — 99204 OFFICE O/P NEW MOD 45 MIN: CPT | Performed by: DERMATOLOGY

## 2023-02-27 RX ORDER — CLOBETASOL PROPIONATE 0.5 MG/G
CREAM TOPICAL
Qty: 60 G | Refills: 11 | Status: SHIPPED | OUTPATIENT
Start: 2023-02-27 | End: 2024-04-18

## 2023-02-27 RX ORDER — TACROLIMUS 1 MG/G
OINTMENT TOPICAL
Qty: 60 G | Refills: 11 | Status: SHIPPED | OUTPATIENT
Start: 2023-02-27 | End: 2024-04-18

## 2023-02-27 NOTE — PROGRESS NOTES
[facility] Dermatology Note  Encounter Date: Feb 27, 2023  Office Visit     Dermatology Problem List:  1. 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   ____________________________________________    Assessment & Plan:    #Atopic/nummular eczema per pt history. Chronic, flare.   Well controlled with clobetasol cream in the past, resolves within 1 week usually. Recommended continuing topicals while still responding, briefly discussed Dupixent injections if not responding to topicals. Reviewed insurance usually requires failure of topical steroid and Protopic, recommended trying Protopic if considering Dupixent in the future. Patient is agreeable to trying Protopic and clobetasol, will consider Dupixent in the future.    - Continue clobetasol 0.05% cream BID x 2 weeks for flares. Refilled.   -  Apply Protopic 0.1% ointment BID when not using clobetasol for maintenance.     Procedures Performed:   None.    Follow-up: prn for new or changing lesions    Staff and Scribe:     Scribe Disclosure:   I, Kaz Carvajal, am serving as a scribe to document services personally performed by this physician, Dr. Tae Navarrete, based on data collection and the provider's statements to me.       Provider Disclosure:   The documentation recorded by the scribe accurately reflects the services I personally performed and the decisions made by me.    Tae Navarrete MD    Department of Dermatology  [facility]: Phone: [phone], Fax:[phone]  [facility]: Phone: [phone] Fax: [phone]  ____________________________________________    CC: Rash (Patient here for a rash, patient had the rash on and off for 10-12 yrs.  "Patient states she get it more when she works out in the heat or where it is warmer. It itches and burns flat and red with a white outline scaly in appearance. )    HPI:  Ms. Crystal Marcus is a(n) 52 year old female who presents today as a new patient for a rash.     Referred to derm for a rash. Referring exam notes \"back of left knee- two large annular pink plaques with white scale making a border.\" Pt was provided script for clobetasol cream.    Today, she reports it has been present for 10-12 years and gets worse in the heat and when exercising. She does no currently have it, but brought pictures. She reports it is worse in the skin folds, on high contact areas. It becomes itchy and burning, and red/white on appearance. It is controlled with clobetasol cream. She also reports it is spreads to the hands and it itchy.   Has history of seasonal allergies in the past, and mold and mildew allergy now. Her son has allergy to hay fever, and eczema in her dad and daughter.     The patient otherwise denies any new or concerning lesions. No bleeding, painful, pruritic, or changing lesions. Health otherwise stable. No other skin concerns.       Labs Reviewed:  N/A    Physical Exam:  Vitals: LMP 12/07/2015   SKIN: Focused examination of hands, arms was performed.  - Skin clear today.  - In patient provided photographs, oval shaped pink scaly plaque on the popliteal fossa.     - No other lesions of concern on areas examined.     Medications:  Current Outpatient Medications   Medication     clobetasol (TEMOVATE) 0.05 % external cream     desvenlafaxine (PRISTIQ) 50 MG 24 hr tablet     multivitamin, therapeutic with minerals (THERA-VIT-M) TABS     pantoprazole (PROTONIX) 20 MG EC tablet     No current facility-administered medications for this visit.      Past Medical History:   Patient Active Problem List   Diagnosis     FH: breast cancer in first degree relative     Moderate recurrent major depression (H)     Hot flashes     " Vitamin D deficiency     Ureterocele, acquired     Other hyperlipidemia     Anxiety     Numbness of toes     Breast pain     Past Medical History:   Diagnosis Date     Arthritis 1/1/2006    X-rays show arthritis in my hips     Complex ovarian cyst 1/15/2015     Depressive disorder      Depressive disorder, not elsewhere classified      FH: breast cancer in first degree relative 8/30/2011     FH: breast cancer in first degree relative      Hydronephrosis 1/23/2015

## 2023-02-27 NOTE — LETTER
2/27/2023         RE: Crystal Marcus  [address] [facility] [address]        Dear Colleague,    Thank you for referring your patient, Crystal Marcus, to the [facility]. Please see a copy of my visit note below.    [facility] Dermatology Note  Encounter Date: Feb 27, 2023  Office Visit     Dermatology Problem List:  1. 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   ____________________________________________    Assessment & Plan:    #Atopic/nummular eczema per pt history. Chronic, flare.   Well controlled with clobetasol cream in the past, resolves within 1 week usually. Recommended continuing topicals while still responding, briefly discussed Dupixent injections if not responding to topicals. Reviewed insurance usually requires failure of topical steroid and Protopic, recommended trying Protopic if considering Dupixent in the future. Patient is agreeable to trying Protopic and clobetasol, will consider Dupixent in the future.    - Continue clobetasol 0.05% cream BID x 2 weeks for flares. Refilled.   -  Apply Protopic 0.1% ointment BID when not using clobetasol for maintenance.     Procedures Performed:   None.    Follow-up: prn for new or changing lesions    Staff and Scribe:     Scribe Disclosure:   I, Kaz Carvajal, am serving as a scribe to document services personally performed by this physician, Dr. Tae Navarrete, based on data collection and the provider's statements to me.       Provider Disclosure:   The documentation recorded by the scribe accurately reflects the services I personally performed and the decisions made by me.    Tae Navarrete MD    Department of Dermatology  [facility]: Phone: [phone],  "Fax:[phone]  [facility]: Phone: [phone] Fax: [phone]  ____________________________________________    CC: Rash (Patient here for a rash, patient had the rash on and off for 10-12 yrs. Patient states she get it more when she works out in the heat or where it is warmer. It itches and burns flat and red with a white outline scaly in appearance. )    HPI:  Ms. Crystal Marcus is a(n) 52 year old female who presents today as a new patient for a rash.     Referred to derm for a rash. Referring exam notes \"back of left knee- two large annular pink plaques with white scale making a border.\" Pt was provided script for clobetasol cream.    Today, she reports it has been present for 10-12 years and gets worse in the heat and when exercising. She does no currently have it, but brought pictures. She reports it is worse in the skin folds, on high contact areas. It becomes itchy and burning, and red/white on appearance. It is controlled with clobetasol cream. She also reports it is spreads to the hands and it itchy.   Has history of seasonal allergies in the past, and mold and mildew allergy now. Her son has allergy to hay fever, and eczema in her dad and daughter.     The patient otherwise denies any new or concerning lesions. No bleeding, painful, pruritic, or changing lesions. Health otherwise stable. No other skin concerns.       Labs Reviewed:  N/A    Physical Exam:  Vitals: LMP 12/07/2015   SKIN: Focused examination of hands, arms was performed.  - Skin clear today.  - In patient provided photographs, oval shaped pink scaly plaque on the popliteal fossa.     - No other lesions of concern on areas examined.     Medications:  Current Outpatient Medications   Medication     clobetasol (TEMOVATE) 0.05 % external cream     desvenlafaxine (PRISTIQ) 50 MG 24 hr tablet     multivitamin, therapeutic with minerals (THERA-VIT-M) TABS     pantoprazole (PROTONIX) 20 MG EC " tablet     No current facility-administered medications for this visit.      Past Medical History:   Patient Active Problem List   Diagnosis     FH: breast cancer in first degree relative     Moderate recurrent major depression (H)     Hot flashes     Vitamin D deficiency     Ureterocele, acquired     Other hyperlipidemia     Anxiety     Numbness of toes     Breast pain     Past Medical History:   Diagnosis Date     Arthritis 1/1/2006    X-rays show arthritis in my hips     Complex ovarian cyst 1/15/2015     Depressive disorder      Depressive disorder, not elsewhere classified      FH: breast cancer in first degree relative 8/30/2011     FH: breast cancer in first degree relative      Hydronephrosis 1/23/2015           Again, thank you for allowing me to participate in the care of your patient.        Sincerely,        Tae Navarrete MD

## 2023-02-27 NOTE — NURSING NOTE
Crystal Marcus's goals for this visit include:   Chief Complaint   Patient presents with     Rash     Patient here for a rash, patient had the rash on and off for 10-12 yrs. Patient states she get it more when she works out in the heat or where it is warmer. It itches and burns flat and red with a white outline scaly in appearance.        She requests these members of her care team be copied on today's visit information:     PCP: Crystal Abarca    Referring Provider:  Referred Self, MD  No address on file    [facility] 12/07/2015     Do you need any medication refills at today's visit? No         Lana JUNIOR

## 2023-02-28 ENCOUNTER — TELEPHONE (OUTPATIENT)
Dept: DERMATOLOGY | Facility: CLINIC | Age: 53
End: 2023-02-28
Payer: COMMERCIAL

## 2023-02-28 NOTE — TELEPHONE ENCOUNTER
Prior Authorization Approval    Authorization Effective Date: 2/28/2023  Authorization Expiration Date: 2/28/2024  Medication: Tacrolimus 0.1% ointment  Approved Dose/Quantity:   Reference #: [id number]   Insurance Company: [employer] ([facility]) - Phone [phone] Fax [phone]    Which Pharmacy is filling the prescription (Not needed for infusion/clinic administered): [id number] [place] - [address] [place] [address]  Pharmacy Notified: Yes  Patient Notified: Yes

## 2023-02-28 NOTE — TELEPHONE ENCOUNTER
PA Initiation    Medication: Tacrolimus 0.1% ointment  Insurance Company: [employer] ([facility]) - Phone [phone] Fax [phone]  Pharmacy Filling the Rx: [id number] - [place] - [address] [place] [address] AT [facility]  Filling Pharmacy Phone: [phone]  Filling Pharmacy Fax: [phone]  Start Date: 2/28/2023

## 2023-04-18 DIAGNOSIS — R12 HEARTBURN: ICD-10-CM

## 2023-04-18 RX ORDER — PANTOPRAZOLE SODIUM 20 MG/1
20 TABLET, DELAYED RELEASE ORAL DAILY
Qty: 30 TABLET | Refills: 1 | Status: SHIPPED | OUTPATIENT
Start: 2023-04-18 | End: 2023-08-30

## 2023-07-27 ENCOUNTER — TELEPHONE (OUTPATIENT)
Dept: FAMILY MEDICINE | Facility: CLINIC | Age: 53
End: 2023-07-27
Payer: COMMERCIAL

## 2023-07-27 DIAGNOSIS — F41.1 GAD (GENERALIZED ANXIETY DISORDER): ICD-10-CM

## 2023-07-27 DIAGNOSIS — F33.1 MODERATE RECURRENT MAJOR DEPRESSION (H): ICD-10-CM

## 2023-07-27 RX ORDER — DESVENLAFAXINE 50 MG/1
50 TABLET, FILM COATED, EXTENDED RELEASE ORAL DAILY
Qty: 45 TABLET | Refills: 0 | Status: SHIPPED | OUTPATIENT
Start: 2023-07-27 | End: 2023-08-30 | Stop reason: DRUGHIGH

## 2023-07-27 NOTE — TELEPHONE ENCOUNTER
Patient has an appointment on 9/12/2023, can you send over a rx to get her to this appointment?  Please advise.  Thank you  eTri Umaña

## 2023-08-16 ENCOUNTER — HOSPITAL ENCOUNTER (EMERGENCY)
Facility: CLINIC | Age: 53
Discharge: HOME | End: 2023-08-16
Attending: FAMILY MEDICINE | Admitting: FAMILY MEDICINE
Payer: COMMERCIAL

## 2023-08-16 ENCOUNTER — APPOINTMENT (OUTPATIENT)
Dept: GENERAL RADIOLOGY | Facility: CLINIC | Age: 53
End: 2023-08-16
Attending: FAMILY MEDICINE
Payer: COMMERCIAL

## 2023-08-16 ENCOUNTER — VIRTUAL VISIT (OUTPATIENT)
Dept: URGENT CARE | Facility: CLINIC | Age: 53
End: 2023-08-16
Payer: COMMERCIAL

## 2023-08-16 VITALS
TEMPERATURE: 98 F | HEART RATE: 77 BPM | DIASTOLIC BLOOD PRESSURE: 73 MMHG | SYSTOLIC BLOOD PRESSURE: 113 MMHG | RESPIRATION RATE: 23 BRPM | OXYGEN SATURATION: 94 % | HEIGHT: 67 IN | BODY MASS INDEX: 28.25 KG/M2 | WEIGHT: 180 LBS

## 2023-08-16 DIAGNOSIS — R07.9 CHEST PAIN, UNSPECIFIED TYPE: ICD-10-CM

## 2023-08-16 DIAGNOSIS — R07.89 CHEST PRESSURE: Primary | ICD-10-CM

## 2023-08-16 LAB
ALBUMIN SERPL BCG-MCNC: 3.5 G/DL (ref 3.5–5.2)
ALP SERPL-CCNC: 62 U/L (ref 35–104)
ALT SERPL W P-5'-P-CCNC: 16 U/L (ref 0–50)
ANION GAP SERPL CALCULATED.3IONS-SCNC: 10 MMOL/L (ref 7–15)
AST SERPL W P-5'-P-CCNC: 19 U/L (ref 0–45)
BASOPHILS # BLD AUTO: 0 10E3/UL (ref 0–0.2)
BASOPHILS NFR BLD AUTO: 1 %
BILIRUB DIRECT SERPL-MCNC: <0.2 MG/DL (ref 0–0.3)
BILIRUB SERPL-MCNC: <0.2 MG/DL
BUN SERPL-MCNC: 15.1 MG/DL (ref 6–20)
CALCIUM SERPL-MCNC: 8.7 MG/DL (ref 8.6–10)
CHLORIDE SERPL-SCNC: 106 MMOL/L (ref 98–107)
CREAT SERPL-MCNC: 0.62 MG/DL (ref 0.51–0.95)
D DIMER PPP FEU-MCNC: 0.34 UG/ML FEU (ref 0–0.5)
DEPRECATED HCO3 PLAS-SCNC: 23 MMOL/L (ref 22–29)
EOSINOPHIL # BLD AUTO: 0.1 10E3/UL (ref 0–0.7)
EOSINOPHIL NFR BLD AUTO: 2 %
ERYTHROCYTE [DISTWIDTH] IN BLOOD BY AUTOMATED COUNT: 12.2 % (ref 10–15)
GFR SERPL CREATININE-BSD FRML MDRD: >90 ML/MIN/1.73M2
GLUCOSE SERPL-MCNC: 108 MG/DL (ref 70–99)
HCT VFR BLD AUTO: 40 % (ref 35–47)
HGB BLD-MCNC: 14.4 G/DL (ref 11.7–15.7)
HOLD SPECIMEN: NORMAL
IMM GRANULOCYTES # BLD: 0 10E3/UL
IMM GRANULOCYTES NFR BLD: 0 %
LIPASE SERPL-CCNC: 27 U/L (ref 13–60)
LYMPHOCYTES # BLD AUTO: 2.2 10E3/UL (ref 0.8–5.3)
LYMPHOCYTES NFR BLD AUTO: 34 %
MCH RBC QN AUTO: 30.2 PG (ref 26.5–33)
MCHC RBC AUTO-ENTMCNC: 36 G/DL (ref 31.5–36.5)
MCV RBC AUTO: 84 FL (ref 78–100)
MONOCYTES # BLD AUTO: 0.4 10E3/UL (ref 0–1.3)
MONOCYTES NFR BLD AUTO: 6 %
NEUTROPHILS # BLD AUTO: 3.7 10E3/UL (ref 1.6–8.3)
NEUTROPHILS NFR BLD AUTO: 57 %
NRBC # BLD AUTO: 0 10E3/UL
NRBC BLD AUTO-RTO: 0 /100
PLATELET # BLD AUTO: 245 10E3/UL (ref 150–450)
POTASSIUM SERPL-SCNC: 4.3 MMOL/L (ref 3.4–5.3)
PROT SERPL-MCNC: 5.7 G/DL (ref 6.4–8.3)
RBC # BLD AUTO: 4.77 10E6/UL (ref 3.8–5.2)
SODIUM SERPL-SCNC: 139 MMOL/L (ref 136–145)
TROPONIN T SERPL HS-MCNC: <6 NG/L
TSH SERPL DL<=0.005 MIU/L-ACNC: 1.22 UIU/ML (ref 0.3–4.2)
WBC # BLD AUTO: 6.5 10E3/UL (ref 4–11)

## 2023-08-16 PROCEDURE — 93005 ELECTROCARDIOGRAM TRACING: CPT | Performed by: FAMILY MEDICINE

## 2023-08-16 PROCEDURE — 99284 EMERGENCY DEPT VISIT MOD MDM: CPT | Mod: 25 | Performed by: FAMILY MEDICINE

## 2023-08-16 PROCEDURE — 85025 COMPLETE CBC W/AUTO DIFF WBC: CPT | Performed by: FAMILY MEDICINE

## 2023-08-16 PROCEDURE — 99207 PR NON-BILLABLE SERV PER CHARTING: CPT

## 2023-08-16 PROCEDURE — 36415 COLL VENOUS BLD VENIPUNCTURE: CPT | Performed by: FAMILY MEDICINE

## 2023-08-16 PROCEDURE — 71046 X-RAY EXAM CHEST 2 VIEWS: CPT

## 2023-08-16 PROCEDURE — 83690 ASSAY OF LIPASE: CPT | Performed by: FAMILY MEDICINE

## 2023-08-16 PROCEDURE — 99285 EMERGENCY DEPT VISIT HI MDM: CPT | Mod: 25 | Performed by: FAMILY MEDICINE

## 2023-08-16 PROCEDURE — 93010 ELECTROCARDIOGRAM REPORT: CPT | Performed by: FAMILY MEDICINE

## 2023-08-16 PROCEDURE — 82248 BILIRUBIN DIRECT: CPT | Performed by: FAMILY MEDICINE

## 2023-08-16 PROCEDURE — 84443 ASSAY THYROID STIM HORMONE: CPT | Performed by: FAMILY MEDICINE

## 2023-08-16 PROCEDURE — 85379 FIBRIN DEGRADATION QUANT: CPT | Performed by: FAMILY MEDICINE

## 2023-08-16 PROCEDURE — 84484 ASSAY OF TROPONIN QUANT: CPT | Performed by: FAMILY MEDICINE

## 2023-08-16 PROCEDURE — 80053 COMPREHEN METABOLIC PANEL: CPT | Performed by: FAMILY MEDICINE

## 2023-08-16 ASSESSMENT — ACTIVITIES OF DAILY LIVING (ADL)
ADLS_ACUITY_SCORE: 33
ADLS_ACUITY_SCORE: 35

## 2023-08-16 ASSESSMENT — ENCOUNTER SYMPTOMS
NAUSEA: 0
FREQUENCY: 0
FEVER: 0
PALPITATIONS: 0
SINUS PRESSURE: 0
VOMITING: 0
DYSURIA: 0
ABDOMINAL PAIN: 0
CHILLS: 0
DIARRHEA: 0
COUGH: 0
FATIGUE: 1
WHEEZING: 0
CONSTIPATION: 0
SORE THROAT: 0
SHORTNESS OF BREATH: 1
DIAPHORESIS: 0
BLOOD IN STOOL: 0
HEADACHES: 0

## 2023-08-16 NOTE — PROGRESS NOTES
Crystal is a 52 year old female who presents for a billable video visit.    ASSESSMENT/PLAN:  Diagnoses and all orders for this visit:    Chest pressure        Patient Instructions   To go to the ER for evaluation.    Patient agreeable to plan.    SUBJECTIVE:  Patient reports that she has been feeling exhausted for the last few days, and feels like there is a weight on her chest. She gets winded easily and short of breath at times. She has no head congestion or real cold symptoms. She has had a cough over the past several months. No headache. No chest pain.     OBJECTIVE:  Vitals not done due to this being a virtual visit    GENERAL: Healthy, alert and no distress  EYES: Eyes grossly normal to inspection.  No discharge or erythema, or obvious scleral/conjunctival abnormalities.  RESP: No audible wheeze, cough, or visible cyanosis.  No visible retractions or increased work of breathing.    SKIN: Visible skin clear. No significant rash, abnormal pigmentation or lesions.  NEURO: Cranial nerves grossly intact.  Mentation and speech appropriate for age.  PSYCH: Mentation appears normal, affect normal/bright, judgement and insight intact, normal speech and appearance well-groomed.        Video-Visit Details    Type of service:  Video Visit  Video Start Time: 6:02 PM  Video End Time:6:07 PM    Originating Location (pt. Location): Home    Distant Location (provider location):  [facility]     Platform used for Video Visit: TUCKER Alexis, CNP

## 2023-08-16 NOTE — ED TRIAGE NOTES
Pt presents with 3 days of CP.  It is constant.      Triage Assessment       Row Name 08/16/23 4542       Triage Assessment (Adult)    Airway WDL WDL       Cognitive/Neuro/Behavioral WDL    Cognitive/Neuro/Behavioral WDL WDL

## 2023-08-17 NOTE — ED NOTES
3 days of exertional CP and SOB. No leg pain or swelling.No smoking. No birth control. 1 month ago drive to [place] and back. Pain is tightness substernal through to the back. Bilateral radial pulses equal and strong. No nausea or emesis. Denies sweating with this pain. Heart tone S1S2, Lungs clear bilateral.

## 2023-08-17 NOTE — ED PROVIDER NOTES
History     Chief Complaint   Patient presents with    Chest Pain     HPI  Crystal Marcus is a 52 year old female who presents  with 3 days of chest pain unchanged.  She has had an anterior tightness.  No associated wheezing cough congestion upper respiratory symptoms.  There is a sense of dyspnea versus fatigue on exertion.  She has some radiation to the back.  No associated nausea vomiting.  No pleuritic chest pain.  There is no associated weakness.  She has no VTE risk other than recent prolonged travel to [place] and back about 4 weeks ago.  No known heart disease lung disease.  No asthma.  She is under some increased worry in the last few days related to her son traveling off to school.  No other associated conditions.    recent travel (>3 hours) by car ,  Denies  history or FHx of venous thromboembolism, recent surgery (last 4 weeks), active cancer history, hypercoagulable state, estrogen or other medications/conditions causing VTE or  new unilateral swelling or pain in the legs or calves.      Allergies:  Allergies   Allergen Reactions    Sulfa Antibiotics Rash     SULFA       Problem List:    Patient Active Problem List    Diagnosis Date Noted    Numbness of toes 08/11/2022     Priority: Medium    Breast pain 08/11/2022     Priority: Medium    Other hyperlipidemia 09/30/2019     Priority: Medium    Anxiety 09/30/2019     Priority: Medium    Ureterocele, acquired 01/23/2015     Priority: Medium     right, associated right hydronephrosis        Vitamin D deficiency 12/01/2014     Priority: Medium     Problem list name updated by automated process. Provider to review      Hot flashes 01/10/2013     Priority: Medium    Moderate recurrent major depression (H)      Priority: Medium    FH: breast cancer in first degree relative 08/30/2011     Priority: Medium        Past Medical History:    Past Medical History:   Diagnosis Date    Arthritis 1/1/2006    Complex ovarian cyst 1/15/2015    Depressive disorder      Depressive disorder, not elsewhere classified     FH: breast cancer in first degree relative 2011    FH: breast cancer in first degree relative     Hydronephrosis 2015       Past Surgical History:    Past Surgical History:   Procedure Laterality Date    BIOPSY  2015    DAVINCI HYSTERECTOMY TOTAL, SALPINGECTOMY BILATERAL Bilateral 2015    Procedure: DAVINCI HYSTERECTOMY TOTAL, SALPINGECTOMY BILATERAL;  Surgeon: Dary Bloom MD;  Location: [facility]    HYSTERECTOMY, PAP NO LONGER INDICATED      SURGICAL HISTORY OF -       Cryotherapy    wisdom teeth removal         Family History:    Family History   Problem Relation Age of Onset    Breast Cancer Mother     Lipids Mother     Hypertension Mother     Breast Cancer Maternal Grandmother     Hypertension Father     Seizure Disorder Brother        Social History:  Marital Status:   [2]  Social History     Tobacco Use    Smoking status: Former     Packs/day: 0.20     Years: 12.00     Pack years: 2.40     Types: Cigarettes     Start date: 1988     Quit date: 2000     Years since quittin.9    Smokeless tobacco: Never   Vaping Use    Vaping Use: Never used   Substance Use Topics    Alcohol use: Yes     Comment: occ    Drug use: No        Medications:    clobetasol (TEMOVATE) 0.05 % external cream  desvenlafaxine (PRISTIQ) 50 MG 24 hr tablet  pantoprazole (PROTONIX) 20 MG EC tablet  tacrolimus (PROTOPIC) 0.1 % external ointment  multivitamin, therapeutic with minerals (THERA-VIT-M) TABS          Review of Systems   Constitutional:  Positive for fatigue. Negative for chills, diaphoresis and fever.   HENT:  Negative for ear pain, sinus pressure and sore throat.    Eyes:  Negative for visual disturbance.   Respiratory:  Positive for shortness of breath. Negative for cough and wheezing.    Cardiovascular:  Positive for chest pain. Negative for palpitations.   Gastrointestinal:  Negative for abdominal pain, blood in stool, constipation,  "diarrhea, nausea and vomiting.   Genitourinary:  Negative for dysuria, frequency and urgency.   Skin:  Negative for rash.   Neurological:  Negative for headaches.   All other systems reviewed and are negative.      Physical Exam   BP: (!) 141/92  Pulse: 79  Temp: 98  F (36.7  C)  Resp: 18  Height: 170.2 cm (5' 7\")  Weight: 81.6 kg (180 lb)  SpO2: 97 %      Physical Exam  Constitutional:       General: She is in acute distress.      Appearance: She is not diaphoretic.   Eyes:      Conjunctiva/sclera: Conjunctivae normal.   Cardiovascular:      Rate and Rhythm: Normal rate and regular rhythm.      Heart sounds: No murmur heard.  Pulmonary:      Effort: Pulmonary effort is normal. No respiratory distress.      Breath sounds: Normal breath sounds. No stridor. No wheezing or rhonchi.   Abdominal:      General: Abdomen is flat. There is no distension.      Palpations: Abdomen is soft. There is no mass.      Tenderness: There is no abdominal tenderness. There is no guarding.   Musculoskeletal:      Cervical back: Neck supple.      Right lower leg: No edema.      Left lower leg: No edema.   Skin:     Coloration: Skin is not pale.      Findings: No rash.   Neurological:      General: No focal deficit present.      Mental Status: She is alert.      Cranial Nerves: No cranial nerve deficit.         ED Course                 Procedures                EKG Interpretation:      Interpreted by Mathieu Henry MD  EKG done at 1907 hrs. demonstrates a sinus rhythm with a normal axis.  There is no ST change.  No T wave changes.  T wave inversion in V1 V2.  Normal R progression and no Q waves.  Normal intervals.  Normal conduction with exception of RSR prime lead the 1.  No ectopy.  Impression sinus rhythm 75 bpm no significant acute changes.      Critical Care time:  none               Results for orders placed or performed during the hospital encounter of 08/16/23 (from the past 24 hour(s))   CBC with Platelets & Differential    " Narrative    The following orders were created for panel order CBC with Platelets & Differential.  Procedure                               Abnormality         Status                     ---------                               -----------         ------                     CBC with platelets and d...[[id number]]                      Final result                 Please view results for these tests on the individual orders.   Basic metabolic panel   Result Value Ref Range    Sodium 139 136 - 145 mmol/L    Potassium 4.3 3.4 - 5.3 mmol/L    Chloride 106 98 - 107 mmol/L    Carbon Dioxide (CO2) 23 22 - 29 mmol/L    Anion Gap 10 7 - 15 mmol/L    Urea Nitrogen 15.1 6.0 - 20.0 mg/dL    Creatinine 0.62 0.51 - 0.95 mg/dL    Calcium 8.7 8.6 - 10.0 mg/dL    Glucose 108 (H) 70 - 99 mg/dL    GFR Estimate >90 >60 mL/min/1.73m2   Troponin T, High Sensitivity   Result Value Ref Range    Troponin T, High Sensitivity <6 <=14 ng/L   [facility] Draw    Narrative    The following orders were created for panel order [facility] Draw.  Procedure                               Abnormality         Status                     ---------                               -----------         ------                     Extra Blue Top Tube[[id number]]                              Final result                 Please view results for these tests on the individual orders.   CBC with platelets and differential   Result Value Ref Range    WBC Count 6.5 4.0 - 11.0 10e3/uL    RBC Count 4.77 3.80 - 5.20 10e6/uL    Hemoglobin 14.4 11.7 - 15.7 g/dL    Hematocrit 40.0 35.0 - 47.0 %    MCV 84 78 - 100 fL    MCH 30.2 26.5 - 33.0 pg    MCHC 36.0 31.5 - 36.5 g/dL    RDW 12.2 10.0 - 15.0 %    Platelet Count 245 150 - 450 10e3/uL    % Neutrophils 57 %    % Lymphocytes 34 %    % Monocytes 6 %    % Eosinophils 2 %    % Basophils 1 %    % Immature Granulocytes 0 %    NRBCs per 100 WBC 0 <1 /100    Absolute Neutrophils 3.7 1.6 - 8.3 10e3/uL    Absolute Lymphocytes 2.2 0.8 - 5.3  10e3/uL    Absolute Monocytes 0.4 0.0 - 1.3 10e3/uL    Absolute Eosinophils 0.1 0.0 - 0.7 10e3/uL    Absolute Basophils 0.0 0.0 - 0.2 10e3/uL    Absolute Immature Granulocytes 0.0 <=0.4 10e3/uL    Absolute NRBCs 0.0 10e3/uL   Extra Blue Top Tube   Result Value Ref Range    Hold Specimen JIC    D dimer quantitative   Result Value Ref Range    D-Dimer Quantitative 0.34 0.00 - 0.50 ug/mL FEU    Narrative    This D-dimer assay is intended for use in conjunction with a clinical pretest probability assessment model to exclude pulmonary embolism (PE) and deep venous thrombosis (DVT) in outpatients suspected of PE or DVT. The cut-off value is 0.50 ug/mL FEU.   Lipase   Result Value Ref Range    Lipase 27 13 - 60 U/L   Hepatic panel   Result Value Ref Range    Protein Total 5.7 (L) 6.4 - 8.3 g/dL    Albumin 3.5 3.5 - 5.2 g/dL    Bilirubin Total <0.2 <=1.2 mg/dL    Alkaline Phosphatase 62 35 - 104 U/L    AST 19 0 - 45 U/L    ALT 16 0 - 50 U/L    Bilirubin Direct <0.20 0.00 - 0.30 mg/dL   TSH with free T4 reflex   Result Value Ref Range    TSH 1.22 0.30 - 4.20 uIU/mL   Chest XR,  PA & LAT    Narrative    EXAM: XR CHEST 2 VIEWS  LOCATION: [facility]  DATE: 8/16/2023    INDICATION: chest pain  COMPARISON: None.      Impression    IMPRESSION: Negative chest.       Medications - No data to display    Assessments & Plan (with Medical Decision Making)     MDM; Crystal Marcus is a 52 year old female presents with anterior chest pain onset 3 days ago anterior and remaining anteriorly.  History of acid reflux in the past not on antacids at this time.  No obvious exertional pain but does feel that she is fatigued or dyspneic on exertion.  No wheezing cough other upper respiratory symptoms.  No other cardiopulmonary symptoms.  Some of her pain does radiate to the back.  Normal radial pulses normal vital signs.  Chest x-ray negative D-dimer negative after traveling long distance in early July.  EKG nonischemic  troponin negative.  Discussed management as below.  Precautions given for return.         HEART Score  Background  Calculates the overall risk of adverse event in patient's presenting with chest pain.  Based on 5 criteria (each assigned 0-2 points) including suspiciousness of history, EKG, age, risk factors and troponin.    Data  52 year old female  has FH: breast cancer in first degree relative; Moderate recurrent major depression (H); Hot flashes; Vitamin D deficiency; Ureterocele, acquired; Other hyperlipidemia; Anxiety; Numbness of toes; and Breast pain on their problem list.   reports that she quit smoking about 22 years ago. Her smoking use included cigarettes. She started smoking about 35 years ago. She has a 2.40 pack-year smoking history. She has never used smokeless tobacco.  family history includes Breast Cancer in her maternal grandmother and mother; Hypertension in her father and mother; Lipids in her mother; Seizure Disorder in her brother.  No results found for: TROPI  Criteria   0-2 points for each of 5 items (maximum of 10 points):  Score 1- History moderately suspicious for coronary syndrome  Score 0- EKG Normal  Score 1- Age 45 to 65 years old  Score 0- No risk factors for atherosclerotic disease  Score 0- Within normal limits for troponin levels  Interpretation  Risk of adverse outcome  Heart Score: 2  Total Score 0-3- Adverse Outcome Risk 2.5% - Supports early discharge with appropriate follow-up        I have reviewed the nursing notes.    I have reviewed the findings, diagnosis, plan and need for follow up with the patient.           Medical Decision Making  The patient's presentation was of moderate complexity (an acute illness with systemic symptoms).    The patient's evaluation involved:  ordering and/or review of 3+ test(s) in this encounter (see separate area of note for details)    The patient's management necessitated only low risk treatment.        New Prescriptions    No medications  on file       Final diagnoses:   Chest pain, unspecified type - unclear cause.  avoid chest wall overuse.  take omeprazole 20 mg orally daily for 3-4 weeks/.  follow-up clinic. consider stress testing/. consider other causes of fatigue, anxiousness.  stay hydrated 64 oz fluid per day.,  upright 2 hours after eating. return here for worserning. reassuring evaluation here       8/16/2023   [facility] EMERGENCY DEPT       Mathieu Henry MD  08/16/23 1858

## 2023-08-17 NOTE — DISCHARGE INSTRUCTIONS
ICD-10-CM    1. Chest pain, unspecified type  R07.9 Primary Care Referral    unclear cause.  avoid chest wall overuse.  take omeprazole 20 mg orally daily for 3-4 weeks/.  follow-up clinic. consider stress testing/. consider other causes of fatigue, anxiousness.  stay hydrated 64 oz fluid per day.,  upright 2 hours after eating. return here for worserning. reassuring evaluation here

## 2023-08-30 ENCOUNTER — OFFICE VISIT (OUTPATIENT)
Dept: FAMILY MEDICINE | Facility: CLINIC | Age: 53
End: 2023-08-30
Attending: FAMILY MEDICINE
Payer: COMMERCIAL

## 2023-08-30 VITALS
HEART RATE: 88 BPM | WEIGHT: 185.2 LBS | OXYGEN SATURATION: 96 % | TEMPERATURE: 97 F | BODY MASS INDEX: 29.07 KG/M2 | RESPIRATION RATE: 16 BRPM | SYSTOLIC BLOOD PRESSURE: 100 MMHG | DIASTOLIC BLOOD PRESSURE: 72 MMHG | HEIGHT: 67 IN

## 2023-08-30 DIAGNOSIS — Z12.31 VISIT FOR SCREENING MAMMOGRAM: Primary | ICD-10-CM

## 2023-08-30 DIAGNOSIS — R07.89 OTHER CHEST PAIN: ICD-10-CM

## 2023-08-30 DIAGNOSIS — F41.1 GAD (GENERALIZED ANXIETY DISORDER): ICD-10-CM

## 2023-08-30 DIAGNOSIS — R12 HEARTBURN: ICD-10-CM

## 2023-08-30 DIAGNOSIS — F33.1 MODERATE RECURRENT MAJOR DEPRESSION (H): ICD-10-CM

## 2023-08-30 PROCEDURE — 99214 OFFICE O/P EST MOD 30 MIN: CPT | Performed by: STUDENT IN AN ORGANIZED HEALTH CARE EDUCATION/TRAINING PROGRAM

## 2023-08-30 RX ORDER — PANTOPRAZOLE SODIUM 20 MG/1
20 TABLET, DELAYED RELEASE ORAL DAILY
Qty: 30 TABLET | Refills: 4 | Status: SHIPPED | OUTPATIENT
Start: 2023-08-30 | End: 2024-04-19

## 2023-08-30 RX ORDER — DESVENLAFAXINE 25 MG/1
TABLET, EXTENDED RELEASE ORAL
Qty: 60 TABLET | Refills: 0 | Status: SHIPPED | OUTPATIENT
Start: 2023-09-28 | End: 2024-01-26

## 2023-08-30 ASSESSMENT — PATIENT HEALTH QUESTIONNAIRE - PHQ9
10. IF YOU CHECKED OFF ANY PROBLEMS, HOW DIFFICULT HAVE THESE PROBLEMS MADE IT FOR YOU TO DO YOUR WORK, TAKE CARE OF THINGS AT HOME, OR GET ALONG WITH OTHER PEOPLE: NOT DIFFICULT AT ALL
SUM OF ALL RESPONSES TO PHQ QUESTIONS 1-9: 4
SUM OF ALL RESPONSES TO PHQ QUESTIONS 1-9: 4

## 2023-08-30 NOTE — PROGRESS NOTES
Assessment & Plan     1. Other chest pain  Significantly improving. CMP, cbc, trop, D dimer, TSH,Lipase, EKG and  CXR were  unremarkable. I recommend PPI due to previous history of reflux. I will recommend a stress test if symptom recurs  - Primary Care Referral    2. Heartburn    - pantoprazole (PROTONIX) 20 MG EC tablet; Take 1 tablet (20 mg) by mouth daily  Dispense: 30 tablet; Refill: 4    3. Moderate recurrent major depression (H)  Controlled. See HPI , we will start to taper Pristiq.  - desvenlafaxine succinate (PRISTIQ) 25 MG 24 hr tablet; 25 mg daily for 4 weeks, then 25 mg every other day for 4 weeks then 25 mg every 3rd day for 4 weeks then stop.  Dispense: 60 tablet; Refill: 0    4. LIDIA (generalized anxiety disorder)  As above  - desvenlafaxine succinate (PRISTIQ) 25 MG 24 hr tablet; 25 mg daily for 4 weeks, then 25 mg every other day for 4 weeks then 25 mg every 3rd day for 4 weeks then stop.  Dispense: 60 tablet; Refill: 0    5. Visit for screening mammogram  She will like to discuss it at her upcoming physical  [id number]}   MED REC REQUIRED{  Post Medication Reconciliation Status:  Patient was not discharged from an inpatient facility or TCU      Ivette Lewis MD  [facility] ROSETTE Rojas is a 52 year old, presenting for the following health issues:  Hospital F/U        8/30/2023     8:45 AM   Additional Questions   Roomed by Khang       [facility]       Hospital Follow-up Visit:    Hospital/Nursing Home/IP Rehab Facility: [facility]  Date of Admission: 8/16/23  Date of Discharge: 8/16/23  Reason(s) for Admission: Chest Pain     Was your hospitalization related to COVID-19? No   Problems taking medications regularly:  None  Medication changes since discharge: None  Problems adhering to non-medication therapy:  None    Summary of hospitalization:  [facility] discharge summary reviewed  Diagnostic Tests/Treatments reviewed.  Follow up needed:  "none  Other Healthcare Providers Involved in Patient s Care:         None  Update since discharge: improved. She was under a lot of stress from kids going to college. The kids are away now and she feels much better. She now has more time to exercise. She will like to get off of pristiq . She has been on It for a while.  She has history of GERD for which PPI has worked for her in the past. She is not on PPI at this time    Plan of care communicated with patient               Review of Systems   Constitutional, HEENT, cardiovascular, pulmonary, gi and gu systems are negative, except as otherwise noted.      Objective    /72   Pulse 88   Temp 97  F (36.1  C)   Resp 16   Ht 1.702 m (5' 7\")   Wt 84 kg (185 lb 3.2 oz)   LMP 12/07/2015   SpO2 96%   BMI 29.01 kg/m    Body mass index is 29.01 kg/m .  Physical Exam   GENERAL: healthy, alert and no distress  RESP: lungs clear to auscultation - no rales, rhonchi or wheezes  CV: regular rate and rhythm, normal S1 S2, no S3 or S4, no murmur, click or rub,  MS: no gross musculoskeletal defects noted, no edema  PSYCH: mentation appears normal, affect normal/bright                  "

## 2023-08-30 NOTE — PATIENT INSTRUCTIONS
Phil Rojas,    Thank you for allowing [facility] to manage your care.        I sent your prescriptions to your pharmacy.        For your convenience, test results are released as soon as they are available  Please allow 1-2 business days for me to send you a comment about your results.  If not done so, I encourage you to login into [employer] ([email or web address] to review your results in real time.     If you have any questions or concerns, please feel free to call us at [phone].    Sincerely,    Dr. Lewis    Did you know?      You can schedule a video visit for follow-up appointments as well as future appointments for certain conditions.  Please see the below link.     [email or web address]    If you have not already done so,  I encourage you to sign up for [employer] ([email or web address]).  This will allow you to review your results, securely communicate with a provider, and schedule virtual visits as well.

## 2023-09-12 ENCOUNTER — OFFICE VISIT (OUTPATIENT)
Dept: FAMILY MEDICINE | Facility: CLINIC | Age: 53
End: 2023-09-12
Payer: COMMERCIAL

## 2023-09-12 VITALS
OXYGEN SATURATION: 97 % | DIASTOLIC BLOOD PRESSURE: 60 MMHG | WEIGHT: 185.2 LBS | BODY MASS INDEX: 29.07 KG/M2 | SYSTOLIC BLOOD PRESSURE: 96 MMHG | HEIGHT: 67 IN | RESPIRATION RATE: 16 BRPM | TEMPERATURE: 97.8 F | HEART RATE: 82 BPM

## 2023-09-12 DIAGNOSIS — Z23 ENCOUNTER FOR ADMINISTRATION OF VACCINE: ICD-10-CM

## 2023-09-12 DIAGNOSIS — Z12.31 VISIT FOR SCREENING MAMMOGRAM: ICD-10-CM

## 2023-09-12 DIAGNOSIS — Z00.00 ROUTINE GENERAL MEDICAL EXAMINATION AT A HEALTH CARE FACILITY: Primary | ICD-10-CM

## 2023-09-12 PROCEDURE — 99396 PREV VISIT EST AGE 40-64: CPT | Mod: 25 | Performed by: STUDENT IN AN ORGANIZED HEALTH CARE EDUCATION/TRAINING PROGRAM

## 2023-09-12 PROCEDURE — 90682 RIV4 VACC RECOMBINANT DNA IM: CPT | Performed by: STUDENT IN AN ORGANIZED HEALTH CARE EDUCATION/TRAINING PROGRAM

## 2023-09-12 PROCEDURE — 90471 IMMUNIZATION ADMIN: CPT | Performed by: STUDENT IN AN ORGANIZED HEALTH CARE EDUCATION/TRAINING PROGRAM

## 2023-09-12 ASSESSMENT — ENCOUNTER SYMPTOMS
HEADACHES: 0
SHORTNESS OF BREATH: 0
MYALGIAS: 0
EYE PAIN: 0
ARTHRALGIAS: 0
HEMATURIA: 0
HEMATOCHEZIA: 0
JOINT SWELLING: 0
BREAST MASS: 0
ABDOMINAL PAIN: 0
NERVOUS/ANXIOUS: 0
COUGH: 0
SORE THROAT: 0
NAUSEA: 0
DIZZINESS: 0
FREQUENCY: 0
FEVER: 0
DIARRHEA: 0
WEAKNESS: 0
CONSTIPATION: 0
HEARTBURN: 0
PARESTHESIAS: 0
DYSURIA: 0
PALPITATIONS: 0
CHILLS: 0

## 2023-09-12 ASSESSMENT — PATIENT HEALTH QUESTIONNAIRE - PHQ9
SUM OF ALL RESPONSES TO PHQ QUESTIONS 1-9: 1
SUM OF ALL RESPONSES TO PHQ QUESTIONS 1-9: 1
10. IF YOU CHECKED OFF ANY PROBLEMS, HOW DIFFICULT HAVE THESE PROBLEMS MADE IT FOR YOU TO DO YOUR WORK, TAKE CARE OF THINGS AT HOME, OR GET ALONG WITH OTHER PEOPLE: NOT DIFFICULT AT ALL

## 2023-09-12 NOTE — PROGRESS NOTES
SUBJECTIVE:   CC: Crystal is an 52 year old who presents for preventive health visit.       2023     3:07 PM   Additional Questions   Roomed by Khang       Healthy Habits:     Getting at least 3 servings of Calcium per day:  Yes    Bi-annual eye exam:  NO    Dental care twice a year:  Yes    Sleep apnea or symptoms of sleep apnea:  None    Diet:  Vegetarian/vegan    Frequency of exercise:  2-3 days/week    Duration of exercise:  15-30 minutes    Taking medications regularly:  Yes    Additional concerns today:  No      Today's PHQ-9 Score:       2023     3:04 PM   PHQ-9 SCORE   PHQ-9 Total Score MyChart 1 (Minimal depression)   PHQ-9 Total Score 1             }  Have you ever done Advance Care Planning? (For example, a Health Directive, POLST, or a discussion with a medical provider or your loved ones about your wishes): No, advance care planning information given to patient to review.  Patient plans to discuss their wishes with loved ones or provider.      Social History     Tobacco Use    Smoking status: Former     Packs/day: 0.20     Years: 12.00     Pack years: 2.40     Types: Cigarettes     Start date: 1988     Quit date: 2000     Years since quittin.0    Smokeless tobacco: Never   Substance Use Topics    Alcohol use: Yes     Comment: occ             2023     3:06 PM   Alcohol Use   Prescreen: >3 drinks/day or >7 drinks/week? No          No data to display              Reviewed orders with patient.  Reviewed health maintenance and updated orders accordingly - Yes  Labs reviewed in EPIC    Breast Cancer Screening:    FHS-7:       3/19/2021    10:29 AM 2022     8:07 AM   Breast CA Risk Assessment (FHS-7)   Did any of your first-degree relatives have breast or ovarian cancer? Yes Yes   Did any of your relatives have bilateral breast cancer? No No   Did any man in your family have breast cancer? No No   Did any woman in your family have breast and ovarian cancer? No No   Did any  woman in your family have breast cancer before age 50 y? Yes Yes   Do you have 2 or more relatives with breast and/or ovarian cancer? Yes Yes   Do you have 2 or more relatives with breast and/or bowel cancer? No Yes   She had genetic testing done which was negative  click delete button to remove this line now  Mammogram Screening: Recommended annual mammography  Pertinent mammograms are reviewed under the imaging tab.    History of abnormal Pap smear: Status post benign hysterectomy. Health Maintenance and Surgical History updated.      2011    11:48 AM   PAP / HPV   PAP (Historical) NIL      Reviewed and updated as needed this visit by clinical staff   Tobacco  Allergies  Meds              Reviewed and updated as needed this visit by Provider                 Past Medical History:   Diagnosis Date    Arthritis 2006    X-rays show arthritis in my hips    Complex ovarian cyst 1/15/2015    Depressive disorder     Depressive disorder, not elsewhere classified     FH: breast cancer in first degree relative 2011    FH: breast cancer in first degree relative     Hydronephrosis 2015      Past Surgical History:   Procedure Laterality Date    BIOPSY  2015    DAVINCI HYSTERECTOMY TOTAL, SALPINGECTOMY BILATERAL Bilateral 2015    Procedure: DAVINCI HYSTERECTOMY TOTAL, SALPINGECTOMY BILATERAL;  Surgeon: Dary Bloom MD;  Location: [facility] OR    HYSTERECTOMY, PAP NO LONGER INDICATED      SURGICAL HISTORY OF -       Cryotherapy    wisdom teeth removal       OB History    Para Term  AB Living   2 2 2 0 0 0   SAB IAB Ectopic Multiple Live Births   0 0 0 0 0      # Outcome Date GA Lbr Vito/2nd Weight Sex Delivery Anes PTL Lv   2 Term 05           1 Term 01               Review of Systems   Constitutional:  Negative for chills and fever.   HENT:  Negative for congestion, ear pain, hearing loss and sore throat.    Eyes:  Negative for pain and visual disturbance.  "  Respiratory:  Negative for cough and shortness of breath.    Cardiovascular:  Negative for chest pain, palpitations and peripheral edema.   Gastrointestinal:  Negative for abdominal pain, constipation, diarrhea, heartburn, hematochezia and nausea.   Breasts:  Negative for tenderness, breast mass and discharge.   Genitourinary:  Negative for dysuria, frequency, genital sores, hematuria, pelvic pain, urgency, vaginal bleeding and vaginal discharge.   Musculoskeletal:  Negative for arthralgias, joint swelling and myalgias.   Skin:  Negative for rash.   Neurological:  Negative for dizziness, weakness, headaches and paresthesias.   Psychiatric/Behavioral:  Negative for mood changes. The patient is not nervous/anxious.           OBJECTIVE:   BP 96/60   Pulse 82   Temp 97.8  F (36.6  C)   Resp 16   Ht 1.702 m (5' 7\")   Wt 84 kg (185 lb 3.2 oz)   LMP 12/07/2015   SpO2 97%   BMI 29.01 kg/m    Physical Exam  GENERAL: healthy, alert and no distress  EYES: Eyes grossly normal to inspection, PERRL and conjunctivae and sclerae normal  HENT: ear canals and TM's normal, nose and mouth without ulcers or lesions  NECK: no adenopathy, no asymmetry, masses, or scars and thyroid normal to palpation  RESP: lungs clear to auscultation - no rales, rhonchi or wheezes  CV: regular rate and rhythm, normal S1 S2, no S3 or S4, no murmur, click or rub, no peripheral edema and peripheral pulses strong  ABDOMEN: soft, nontender, no hepatosplenomegaly, no masses and bowel sounds normal  MS: no gross musculoskeletal defects noted, no edema  SKIN: no suspicious lesions or rashes  NEURO: Normal strength and tone, mentation intact and speech normal  PSYCH: mentation appears normal, affect normal/bright      ASSESSMENT/PLAN:   1. Routine general medical examination at a health care facility      2. Visit for screening mammogram    - MA SCREENING DIGITAL BILAT - Future  (s+30); Future    3. Encounter for administration of vaccine    - INFLUENZA " "VACCINE 18-64Y (FLUBLOK)      Patient has been advised of split billing requirements and indicates understanding: Yes    COUNSELING:  Reviewed preventive health counseling, as reflected in patient instructions      BMI:   Estimated body mass index is 29.01 kg/m  as calculated from the following:    Height as of this encounter: 1.702 m (5' 7\").    Weight as of this encounter: 84 kg (185 lb 3.2 oz).   Weight management plan: Discussed healthy diet and exercise guidelines      She reports that she quit smoking about 23 years ago. Her smoking use included cigarettes. She started smoking about 35 years ago. She has a 2.40 pack-year smoking history. She has never used smokeless tobacco.          Ivette Lewis MD  [facility] submitted by the patient for this visit:  Patient Health Questionnaire (Submitted on 9/12/2023)  If you checked off any problems, how difficult have these problems made it for you to do your work, take care of things at home, or get along with other people?: Not difficult at all  PHQ9 TOTAL SCORE: 1    "

## 2023-10-04 ENCOUNTER — OFFICE VISIT (OUTPATIENT)
Dept: FAMILY MEDICINE | Facility: CLINIC | Age: 53
End: 2023-10-04
Payer: COMMERCIAL

## 2023-10-04 VITALS
RESPIRATION RATE: 18 BRPM | SYSTOLIC BLOOD PRESSURE: 102 MMHG | WEIGHT: 181.2 LBS | BODY MASS INDEX: 28.44 KG/M2 | HEART RATE: 88 BPM | DIASTOLIC BLOOD PRESSURE: 68 MMHG | OXYGEN SATURATION: 97 % | TEMPERATURE: 97.2 F | HEIGHT: 67 IN

## 2023-10-04 DIAGNOSIS — H61.23 IMPACTED CERUMEN OF BOTH EARS: Primary | ICD-10-CM

## 2023-10-04 PROCEDURE — 90715 TDAP VACCINE 7 YRS/> IM: CPT | Performed by: PHYSICIAN ASSISTANT

## 2023-10-04 PROCEDURE — 69209 REMOVE IMPACTED EAR WAX UNI: CPT | Mod: 50 | Performed by: PHYSICIAN ASSISTANT

## 2023-10-04 PROCEDURE — 99213 OFFICE O/P EST LOW 20 MIN: CPT | Mod: 25 | Performed by: PHYSICIAN ASSISTANT

## 2023-10-04 PROCEDURE — 90471 IMMUNIZATION ADMIN: CPT | Performed by: PHYSICIAN ASSISTANT

## 2023-10-04 RX ORDER — ACETAMINOPHEN 160 MG
1 TABLET,DISINTEGRATING ORAL SEE ADMIN INSTRUCTIONS
Status: SHIPPED | OUTPATIENT
Start: 2023-10-11

## 2023-10-04 RX ADMIN — Medication 1 ML: at 08:48

## 2023-10-04 ASSESSMENT — PAIN SCALES - GENERAL: PAINLEVEL: NO PAIN (0)

## 2023-10-04 NOTE — PROGRESS NOTES
Patient identified using two patient identifiers.  Ear exam showing wax occlusion completed by provider.  H202/H20 was placed in the bilateral ear(s) via irrigation tool: elephant ear.  Marlene Torres MA on 10/4/2023 at 9:06 AM

## 2023-10-04 NOTE — PATIENT INSTRUCTIONS
Samantha Rojas,    Thank you for allowing [facility] to manage your care.    Try debrox over the counter preventatively as directed on packaging.    If you have any questions or concerns, please feel free to call us at [phone]    Sincerely,    Marv Guevara PA-C    Did you know?      You can schedule a video visit for follow-up appointments as well as future appointments for certain conditions.  Please see the below link.     [email or web address]    If you have not already done so,  I encourage you to sign up for TransMed Systemst ([email or web address]).  This will allow you to review your results, securely communicate with a provider, and schedule virtual visits as well.

## 2023-10-04 NOTE — PROGRESS NOTES
Assessment & Plan   Problem List Items Addressed This Visit    None  Visit Diagnoses       Impacted cerumen of both ears    -  Primary    Relevant Medications    hydrogen peroxide 3 % solution 1 mL (Start on 10/11/2023 12:00 AM)    Other Relevant Orders    ND REMOVAL IMPACTED CERUMEN IRRIGATION/LVG BIBI (RN/MA)           Bilateral cerumen impaction causing diminished hearing bilaterally. Symptoms resolved with irrigation using hydrogen peroxide/water solution. Low suspicion for concurrent AOM, OE, mastoiditis or other worrisome issue. Discharge with instructions to use otc Debrox prn and follow up prn.    Complete history and physical exam as below. Afebrile with normal vital signs.    DDx and Dx discussed with and explained to the pt to their satisfaction.  All questions were answered at this time. Pt expressed understanding of and agreement with this dx, tx, and plan. No further workup warranted and standard medication warnings given. I have given the patient a list of pertinent indications for re-evaluation. Will go to the Emergency Department if symptoms worsen or new concerning symptoms arise. Patient left in no apparent distress.     See Patient Instructions    NU Enciso  [facility] ROSETTE Rojas is a 52 year old, presenting for the following health issues:  Plugged Ears      10/4/2023     8:22 AM   Additional Questions   Roomed by Marlene   Accompanied by Self       History of Present Illness       Reason for visit:  Earwax buildup in both ears needs removing    She eats 4 or more servings of fruits and vegetables daily.She consumes 0 sweetened beverage(s) daily.She exercises with enough effort to increase her heart rate 20 to 29 minutes per day.  She exercises with enough effort to increase her heart rate 4 days per week.   She is taking medications regularly.   Noticed muffled hearing in both ears over the last 3-5 days.     Patient here today for plugged ears x 3  "days. She states she has sticky wax, and they build up wax. No pain, drainage, ringing. Just muffled hearing.     Review of Systems   Constitutional, HEENT systems are negative, except as otherwise noted.      Objective    /68   Pulse 88   Temp 97.2  F (36.2  C) (Temporal)   Resp 18   Ht 1.699 m (5' 6.89\")   Wt 82.2 kg (181 lb 3.2 oz)   LMP 12/07/2015   SpO2 97%   BMI 28.47 kg/m    Body mass index is 28.47 kg/m .  Physical Exam  Vitals and nursing note reviewed.   Constitutional:       General: She is not in acute distress.     Appearance: Normal appearance. She is not diaphoretic.   HENT:      Head: Normocephalic and atraumatic.      Right Ear: Tympanic membrane, ear canal and external ear normal. There is impacted cerumen.      Left Ear: Tympanic membrane, ear canal and external ear normal. There is impacted cerumen.      Ears:      Comments: No mastoid or external ear tenderness.     Nose: Nose normal.      Mouth/Throat:      Mouth: Mucous membranes are moist.      Pharynx: Oropharynx is clear.   Eyes:      Conjunctiva/sclera: Conjunctivae normal.   Pulmonary:      Effort: Pulmonary effort is normal. No respiratory distress.   Skin:     General: Skin is dry.      Coloration: Skin is not jaundiced or pale.   Neurological:      General: No focal deficit present.      Mental Status: She is alert. Mental status is at baseline.   Psychiatric:         Mood and Affect: Mood normal.         Behavior: Behavior normal.                        "

## 2023-10-26 ENCOUNTER — ANCILLARY PROCEDURE (OUTPATIENT)
Dept: MAMMOGRAPHY | Facility: CLINIC | Age: 53
End: 2023-10-26
Attending: STUDENT IN AN ORGANIZED HEALTH CARE EDUCATION/TRAINING PROGRAM
Payer: COMMERCIAL

## 2023-10-26 DIAGNOSIS — Z12.31 VISIT FOR SCREENING MAMMOGRAM: ICD-10-CM

## 2023-10-26 PROCEDURE — 77067 SCR MAMMO BI INCL CAD: CPT | Mod: GC | Performed by: RADIOLOGY

## 2023-10-26 PROCEDURE — 77063 BREAST TOMOSYNTHESIS BI: CPT | Mod: GC | Performed by: RADIOLOGY

## 2023-11-22 ENCOUNTER — TELEPHONE (OUTPATIENT)
Dept: FAMILY MEDICINE | Facility: CLINIC | Age: 53
End: 2023-11-22
Payer: COMMERCIAL

## 2023-11-22 DIAGNOSIS — F41.1 GAD (GENERALIZED ANXIETY DISORDER): ICD-10-CM

## 2023-11-22 DIAGNOSIS — F33.1 MODERATE RECURRENT MAJOR DEPRESSION (H): ICD-10-CM

## 2023-11-27 RX ORDER — DESVENLAFAXINE 25 MG/1
TABLET, EXTENDED RELEASE ORAL
Qty: 60 TABLET | Refills: 0 | OUTPATIENT
Start: 2023-11-27

## 2023-11-28 NOTE — TELEPHONE ENCOUNTER
Patient called back and spoke with RN. Patient denies requesting this medication and stated it must have been a pharmacy error.       Please call pharmacy to let them know patient is no longer taking the medication and to remove from list.        Ivy Servin RN on 11/28/2023 at 11:33 AM

## 2023-11-28 NOTE — TELEPHONE ENCOUNTER
Called pharmacy to confirm that pt is not taking this medication and pharmacy aware of this discontinuation. Pharmacy to take this off of pt's list.     Shama Gordon RN on 11/28/2023 at 12:16 PM

## 2023-11-28 NOTE — TELEPHONE ENCOUNTER
Attempted to call patient with number on file to relay provider's message below with no answer, left voicemail to call clinic back at [phone].    When pt returns call, please ask if she ask pharmacy to place a refill request for desvenlafaxine succinate (PRISTIQ) 25 MG 24 hr tablet [Pharmacy Med Name: DESVENLAFAXINE SUCCNT ER 25 MG].    Shama Gordon RN on 11/28/2023 at 9:46 AM

## 2023-11-28 NOTE — TELEPHONE ENCOUNTER
Please call patient to clarify, this medication was tapered off in September.  Did she request this medication or is this on auto refill from pharmacy.  If she would like to restart the medication, please schedule a phone/video visit with a provider.  If this was pharmacy requested, please notify them she has stopped it and remove from med list.   Madeline Pressley PA-C

## 2024-01-26 ENCOUNTER — VIRTUAL VISIT (OUTPATIENT)
Dept: FAMILY MEDICINE | Facility: CLINIC | Age: 54
End: 2024-01-26
Payer: COMMERCIAL

## 2024-01-26 DIAGNOSIS — N95.1 VASOMOTOR SYMPTOMS DUE TO MENOPAUSE: Primary | ICD-10-CM

## 2024-01-26 DIAGNOSIS — F32.5 DEPRESSION, MAJOR, IN REMISSION (H): ICD-10-CM

## 2024-01-26 DIAGNOSIS — F41.1 GAD (GENERALIZED ANXIETY DISORDER): ICD-10-CM

## 2024-01-26 PROCEDURE — 99214 OFFICE O/P EST MOD 30 MIN: CPT | Mod: 95 | Performed by: STUDENT IN AN ORGANIZED HEALTH CARE EDUCATION/TRAINING PROGRAM

## 2024-01-26 RX ORDER — DESVENLAFAXINE 25 MG/1
TABLET, EXTENDED RELEASE ORAL
Qty: 60 TABLET | Refills: 0 | Status: SHIPPED | OUTPATIENT
Start: 2024-01-26 | End: 2024-02-19

## 2024-01-26 NOTE — PROGRESS NOTES
Crystal is a 53 year old who is being evaluated via a billable video visit.      How would you like to obtain your AVS? MyChart  If the video visit is dropped, the invitation should be resent by: Text to cell phone: [phone]  Will anyone else be joining your video visit? No          Assessment & Plan     Vasomotor symptoms due to menopause  Uncontrolled. We will start increasing medication slowly  - desvenlafaxine succinate (PRISTIQ) 25 MG 24 hr tablet; Daily for a week then 2 tabs daily thereafter.   major depression in remission (H)  controlled  - desvenlafaxine succinate (PRISTIQ) 25 MG 24 hr tablet; Daily for a week then 2 tabs daily thereafter.    LIDIA (generalized anxiety disorder)  controlled  - desvenlafaxine succinate (PRISTIQ) 25 MG 24 hr tablet; Daily for a week then 2 tabs daily thereafter.        Subjective   Crystal is a 53 year old, presenting for the following health issues:  Menopausal Sx      1/26/2024     8:16 AM   Additional Questions   Roomed by Francois   Accompanied by Self         1/26/2024     8:16 AM   Patient Reported Additional Medications   Patient reports taking the following new medications N/A     HPI     Menopause symptoms, including sleepless nights and hot flashes.    She has been gradually weaning herself off of Prestiq.She noticed more symptom since she got down to the lower dose. She is currently down to 25 mg weekly  She has been on prestig for anxiety and depression for a long time.  Review of Systems  Constitutional, HEENT, cardiovascular, pulmonary, gi and gu systems are negative, except as otherwise noted.      Objective           Vitals:  No vitals were obtained today due to virtual visit.    Physical Exam   GENERAL: alert and no distress  EYES: Eyes grossly normal to inspection.  No discharge or erythema, or obvious scleral/conjunctival abnormalities.  RESP: No audible wheeze, cough, or visible cyanosis.    SKIN: Visible skin clear. No significant rash, abnormal pigmentation  or lesions.  PSYCH: Appropriate affect, tone, and pace of words          Video-Visit Details    Type of service:  Video Visit     Originating Location (pt. Location): Home    Distant Location (provider location):  On-site  Platform used for Video Visit: Timi  Signed Electronically by: Ivette Lewis MD

## 2024-01-26 NOTE — PATIENT INSTRUCTIONS
Phil Rojas,    Thank you for allowing [facility] to manage your care.        I sent your prescriptions to your pharmacy.        For your convenience, test results are released as soon as they are available  Please allow 1-2 business days for me to send you a comment about your results.  If not done so, I encourage you to login into Cleverbug ([email or web address] to review your results in real time.     If you have any questions or concerns, please feel free to call us at [phone].    Sincerely,    Dr. Lewis    Did you know?      You can schedule a video visit for follow-up appointments as well as future appointments for certain conditions.  Please see the below link.     [email or web address]    If you have not already done so,  I encourage you to sign up for Picot[employer] INCt ([email or web address]).  This will allow you to review your results, securely communicate with a provider, and schedule virtual visits as well.

## 2024-02-19 DIAGNOSIS — F41.1 GAD (GENERALIZED ANXIETY DISORDER): ICD-10-CM

## 2024-02-19 DIAGNOSIS — N95.1 VASOMOTOR SYMPTOMS DUE TO MENOPAUSE: ICD-10-CM

## 2024-02-19 DIAGNOSIS — F32.5 DEPRESSION, MAJOR, IN REMISSION (H): ICD-10-CM

## 2024-02-19 RX ORDER — DESVENLAFAXINE 50 MG/1
50 TABLET, FILM COATED, EXTENDED RELEASE ORAL DAILY
Qty: 90 TABLET | Refills: 0 | Status: SHIPPED | OUTPATIENT
Start: 2024-02-19 | End: 2024-04-18

## 2024-03-22 ENCOUNTER — VIRTUAL VISIT (OUTPATIENT)
Dept: FAMILY MEDICINE | Facility: CLINIC | Age: 54
End: 2024-03-22
Attending: STUDENT IN AN ORGANIZED HEALTH CARE EDUCATION/TRAINING PROGRAM
Payer: COMMERCIAL

## 2024-03-22 DIAGNOSIS — N95.1 VASOMOTOR SYMPTOMS DUE TO MENOPAUSE: Primary | ICD-10-CM

## 2024-03-22 PROCEDURE — 99213 OFFICE O/P EST LOW 20 MIN: CPT | Mod: 95 | Performed by: STUDENT IN AN ORGANIZED HEALTH CARE EDUCATION/TRAINING PROGRAM

## 2024-03-22 RX ORDER — DESVENLAFAXINE 50 MG/1
50 TABLET, FILM COATED, EXTENDED RELEASE ORAL DAILY
Qty: 90 TABLET | Refills: 1 | Status: SHIPPED | OUTPATIENT
Start: 2024-03-22 | End: 2024-09-30

## 2024-03-22 RX ORDER — DESVENLAFAXINE 25 MG/1
25 TABLET, EXTENDED RELEASE ORAL DAILY
Qty: 90 TABLET | Refills: 1 | Status: SHIPPED | OUTPATIENT
Start: 2024-03-22 | End: 2024-09-23

## 2024-03-22 NOTE — PATIENT INSTRUCTIONS
Phil Rojas,    Thank you for allowing [facility] to manage your care.        I sent your prescriptions to your pharmacy.      For your convenience, test results are released as soon as they are available  Please allow 1-2 business days for me to send you a comment about your results.  If not done so, I encourage you to login into Instabeat ([email or web address] to review your results in real time.     If you have any questions or concerns, please feel free to call us at [phone].    Sincerely,    Dr. Lewis    Did you know?      You can schedule a video visit for follow-up appointments as well as future appointments for certain conditions.  Please see the below link.     [email or web address]    If you have not already done so,  I encourage you to sign up for Sarbarit ([email or web address]).  This will allow you to review your results, securely communicate with a provider, and schedule virtual visits as well.

## 2024-03-22 NOTE — PROGRESS NOTES
"Crystal is a 53 year old who is being evaluated via a billable video visit.    How would you like to obtain your AVS? Architurnhart  If the video visit is dropped, the invitation should be resent by: Text to cell phone: [phone]  Will anyone else be joining your video visit? No      Assessment & Plan     Vasomotor symptoms due to menopause  Improving. Patient would like to increase from 50 mg to 75 mg  - desvenlafaxine succinate (PRISTIQ) 25 MG 24 hr tablet; Take 1 tablet (25 mg) by mouth daily Take together with 50 mg for a total of 75 mg  - desvenlafaxine (PRISTIQ) 50 MG 24 hr tablet; Take 1 tablet (50 mg) by mouth daily Take together with 25 mg for a total of 75 mg        BMI  Estimated body mass index is 28.47 kg/m  as calculated from the following:    Height as of 10/4/23: 1.699 m (5' 6.89\").    Weight as of 10/4/23: 82.2 kg (181 lb 3.2 oz).   Weight management plan: Discussed healthy diet and exercise guidelines          Subjective   Crystal is a 53 year old, presenting for the following health issues:  Discuss Menopause      3/22/2024    11:12 AM   Additional Questions   Roomed by Patient completed echeck in via [employer]     History of Present Illness       Reason for visit:  Menopausal Symptoms    She eats 4 or more servings of fruits and vegetables daily.She consumes 0 sweetened beverage(s) daily.She exercises with enough effort to increase her heart rate 10 to 19 minutes per day.  She exercises with enough effort to increase her heart rate 3 or less days per week.   She is taking medications regularly.     Menopause symptoms, including sleepless nights and hot flashes.     She has been gradually weaning herself off of Prestiq.She noticed more symptom since she got down to the lower dose. She is currently down to 25 mg weekly.          Review of Systems  Constitutional, neuro, ENT, endocrine, pulmonary, cardiac, gastrointestinal, genitourinary, musculoskeletal, integument and psychiatric systems are negative, except " as otherwise noted.      Objective           Vitals:  No vitals were obtained today due to virtual visit.    Physical Exam   GENERAL: alert and no distress  EYES: Eyes grossly normal to inspection.  No discharge or erythema, or obvious scleral/conjunctival abnormalities.  RESP: No audible wheeze, cough, or visible cyanosis.    SKIN: Visible skin clear. No significant rash, abnormal pigmentation or lesions.  PSYCH: Appropriate affect, tone, and pace of words          Video-Visit Details    Type of service:  Video Visit   Originating Location (pt. Location): Home    Distant Location (provider location):  On-site  Platform used for Video Visit: Timi  Signed Electronically by: Ivette Lewis MD

## 2024-04-05 NOTE — PATIENT INSTRUCTIONS
Checking for Skin Cancer  You can help find cancer early by checking your skin each month. There are 3 main kinds of skin cancer: melanoma, basal cell carcinoma, and squamous cell carcinoma. Doing monthly skin checks is the best way to find new marks, sores, or skin changes. Follow these instructions for checking your skin.   The ABCDEs of checking moles for melanoma   Check your moles or growths for signs of melanoma using ABCDE:   Asymmetry: The sides of the mole or growth don t match.  Border: The edges are ragged, notched, or blurred.  Color: The color within the mole or growth varies. It could be black, brown, tan, white, or shades of red, gray, or blue.  Diameter: The mole or growth is larger than   inch or 6 mm (size of a pencil eraser).  Evolving: The size, shape, texture, or color of the mole or growth is changing.     ABCDE's of moles on light skin.        ABCDE's of moles on dark skin may be harder to identify.     Checking for other types of skin cancer  Basal cell carcinoma or squamous cell carcinoma cause symptoms like:     A spot or mole that looks different from all other marks on your skin  Changes in how an area feels, such as itching, tenderness, or pain  Changes in the skin's surface, such as oozing, bleeding, or scaliness  A sore that doesn't heal  New swelling, redness, or spread of color beyond the border of a mole    Who s at risk?  Anyone of any skin color can get skin cancer. But you're at greater risk if you have:   Fair skin that freckles easily and burns instead of tanning  Light-colored or red hair  Light-colored eyes  Many moles or abnormal moles on your skin  A long history of unprotected exposure to sunlight or tanning beds  A history of many blistering sunburns as a child or teen  A family history of skin cancer  Been exposed to radiation or chemicals  A weakened immune system  Been exposed to arsenic  If you've had skin cancer in the past, you're at high risk of having it again.    How to check your skin  Do your monthly skin checkups in front of a full-length mirror. Use a room with good lighting so it's easier to see. Use a hand mirror to look at hard-to-see places like your buttocks and back. You can also have a trusted friend or family member help you with these checks. Check every part of your body, including your:   Head (ears, face, neck, and scalp)  Torso (front, back, sides, and under breasts)  Arms (tops, undersides, and armpits)  Hands (palms, backs, and fingers, including under the nails)  Lower back, buttocks, and genitals  Legs (front, back, and sides)  Feet (tops, soles, toes, including under the nails, and between toes)  Watch for new spots on your skin or a spot that's changing in color, shape, size.   If you have a lot of moles, take digital photos of them each month. Make sure to take photos both up close and from a distance. These can help you see if any moles change over time.   Know your skin  Most skin changes aren't cancer. But if you see any changes in your skin, call your healthcare provider right away. Only they can tell you if a change is a problem. If you have skin cancer, seeing your provider can be the first step to getting the treatment that could save your life.   Heather last reviewed this educational content on 10/1/2021    3979-3417 [employer]. All rights reserved. This information is not intended as a substitute for professional medical care. Always follow your healthcare professional's instructions.     Cryotherapy    What is it?  Use of a very cold liquid, such as liquid nitrogen, to freeze and destroy abnormal skin cells that need to be removed    What should I expect?  Tenderness and redness  A small blister that might grow and fill with dark purple blood. There may be crusting.  More than one treatment may be needed if the lesions do not go away.    How do I care for the treated area?  Gently wash the area with your hands when  bathing.  Use a thin layer of Vaseline to help with healing. You may use a Band-Aid.   The area should heal within 7-10 days and may leave behind a pink or lighter color.   Do not use an antibiotic or Neosporin ointment.   You may take acetaminophen (Tylenol) for pain.     Call your doctor if you have:  Severe pain  Signs of infection (warmth, redness, cloudy yellow drainage, and or a bad smell)  Questions or concerns    Who should I call with questions?      [facility]: [phone]      [facility]: [phone]      For urgent needs outside of business hours call the [facility] at [phone] and ask for the dermatology resident on call

## 2024-04-05 NOTE — PROGRESS NOTES
[facility] Dermatology Note  Encounter Date: Apr 18, 2024  Office Visit     Dermatology Problem List:  1. Likely atopic/nummular eczema  - Current tx: clobetasol 0.05% cream BID x 2 weeks for flares  -never used  Protopic 0.1% ointment BID  2. 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     ____________________________________________    Assessment & Plan:    #Atopic/nummular eczema per pt history. Controlled.  .   -  Continue clobetasol for up to 2 weeks. Tacrolimus pt reports not needed     # Nevus, well demarcated with regular pigment R anterior clavicle. Assured patient of benign nature.    # Seborrheic keratosis, inflamed.   - Cryotherapy performed today. See procedure section.    # Sebaceous hyperplasia, L cheek and forehead.  - Reviewed cosmetic destruction option with patient. Reviewed possibility of scarring, discoloration, or recurrence.  - Reviewed tretinoin as treatment option. Patient declines at this time.   - Future considerations: tretinoin, lesion destruction.     # Multiple clinically benign nevi on the trunk and extremities.    - No treatment necessary unless lesions change or become symptomatic.   ABCD's of melanoma were reviewed with patient and handout provided.      Procedures Performed:   - Cryotherapy procedure note, location(s): L lateral flank. After verbal consent and discussion of risks and benefits including, but not limited to, dyspigmentation/scar, blister, and pain, 1 lesion(s) was(were) treated with 1-2 mm freeze border for 1-2 cycles with liquid nitrogen. Post cryotherapy instructions were provided.      Follow-up: 1 year(s) in-person, or earlier for new or changing lesions    Staff and Scribe:     Scribe Disclosure:   Maria Alejandra JORGE, am serving as a scribe to document services personally performed by Sepideh Andre MD based on data collection and the provider's  statements to me.     Provider Disclosure:   The documentation recorded by the scribe accurately reflects the services I personally performed and the decisions made by me.    Sepideh Andre MD    Department of Dermatology  [facility] [place] [facility] [place]: Phone: [phone], Fax:[phone]  [facility]: Phone: [phone], Fax: [phone]   ____________________________________________    CC: Skin Check (R clavicle area, some bumps on face )    HPI:  Ms. Crystal Marcus is a(n) 53 year old female who presents today as a return patient for skin check.     Today, patient reports a changing mole on her R clavicle. She also has some bumps on her face. Reports her eczema is being controlled on the clobetasol      She  wants mooles checked on right shoulder. She also reports spot she is bothered by the left flank    Patient is otherwise feeling well, without additional skin concerns.    Labs Reviewed:  N/A    Physical Exam:  Vitals: LMP 12/07/2015   SKIN: Total skin including external buttock areas was performed. The exam included the head/face, neck, both arms, chest, back, abdomen, both legs, digits and/or nails. Declines genital exam.   - Fleshy well demarcated pigmented papule R anterior clavicle.   - There is a tan to brown waxy stuck on papule with surrounding erythema on the L lateral flank. .   - There are yellow oily papules with central umbilication located on the forehead, L cheek..   - Multiple regular brown pigmented macules and papules are identified on the trunk and extremities. .     - No other lesions of concern on areas examined.     Medications:  Current Outpatient Medications   Medication Sig Dispense Refill    clobetasol (TEMOVATE) 0.05 % external cream Apply twice daily as needed for rash on trunk or extremities for up to two weeks at a time. 60 g 11    desvenlafaxine (PRISTIQ) 50 MG 24 hr tablet  Take 1 tablet (50 mg) by mouth daily Take together with 25 mg for a total of 75 mg 90 tablet 1    desvenlafaxine succinate (PRISTIQ) 25 MG 24 hr tablet Take 1 tablet (25 mg) by mouth daily Take together with 50 mg for a total of 75 mg 90 tablet 1    multivitamin, therapeutic with minerals (THERA-VIT-M) TABS Take 1 tablet by mouth daily      pantoprazole (PROTONIX) 20 MG EC tablet Take 1 tablet (20 mg) by mouth daily 30 tablet 4    tacrolimus (PROTOPIC) 0.1 % external ointment Apply twice daily as needed for rash on trunk or extremities 60 g 11     Current Facility-Administered Medications   Medication Dose Route Frequency Provider Last Rate Last Admin    hydrogen peroxide 3 % solution 1 mL  1 mL Topical See Admin Instructions Marcial Guevara, PA   1 mL at 10/04/23 0848      Past Medical History:   Patient Active Problem List   Diagnosis    FH: breast cancer in first degree relative    Hot flashes    Vitamin D deficiency    Ureterocele, acquired    Other hyperlipidemia    Anxiety    Numbness of toes    Breast pain     Past Medical History:   Diagnosis Date    Arthritis 01/01/2006    X-rays show arthritis in my hips    Complex ovarian cyst 01/15/2015    Depressive disorder     Depressive disorder, not elsewhere classified     FH: breast cancer in first degree relative 08/30/2011    FH: breast cancer in first degree relative     Hydronephrosis 01/23/2015        CC Referred Self, MD  No address on file on close of this encounter.

## 2024-04-18 ENCOUNTER — OFFICE VISIT (OUTPATIENT)
Dept: DERMATOLOGY | Facility: CLINIC | Age: 54
End: 2024-04-18
Payer: COMMERCIAL

## 2024-04-18 DIAGNOSIS — L82.0 INFLAMED SEBORRHEIC KERATOSIS: Primary | ICD-10-CM

## 2024-04-18 DIAGNOSIS — L30.0 NUMMULAR DERMATITIS: ICD-10-CM

## 2024-04-18 DIAGNOSIS — D22.9 MULTIPLE BENIGN NEVI: ICD-10-CM

## 2024-04-18 DIAGNOSIS — L73.8 SENILE SEBACEOUS GLAND HYPERPLASIA: ICD-10-CM

## 2024-04-18 DIAGNOSIS — R12 HEARTBURN: ICD-10-CM

## 2024-04-18 PROCEDURE — 99214 OFFICE O/P EST MOD 30 MIN: CPT | Mod: 25 | Performed by: DERMATOLOGY

## 2024-04-18 PROCEDURE — 17110 DESTRUCTION B9 LES UP TO 14: CPT | Performed by: DERMATOLOGY

## 2024-04-18 RX ORDER — TACROLIMUS 1 MG/G
OINTMENT TOPICAL
Qty: 60 G | Refills: 11 | Status: CANCELLED | OUTPATIENT
Start: 2024-04-18

## 2024-04-18 RX ORDER — CLOBETASOL PROPIONATE 0.5 MG/G
CREAM TOPICAL
Qty: 60 G | Refills: 11 | Status: SHIPPED | OUTPATIENT
Start: 2024-04-18

## 2024-04-18 NOTE — LETTER
4/18/2024         RE: Crystal Marcus  [address] [facility] [address]        Dear Colleague,    Thank you for referring your patient, Crystal Marcus, to the [facility]. Please see a copy of my visit note below.      [facility] Dermatology Note  Encounter Date: Apr 18, 2024  Office Visit     Dermatology Problem List:  1. Likely atopic/nummular eczema  - Current tx: clobetasol 0.05% cream BID x 2 weeks for flares  -never used  Protopic 0.1% ointment BID  2. 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     ____________________________________________    Assessment & Plan:    #Atopic/nummular eczema per pt history. Controlled.  .   -  Continue clobetasol for up to 2 weeks. Tacrolimus pt reports not needed     # Nevus, well demarcated with regular pigment R anterior clavicle. Assured patient of benign nature.    # Seborrheic keratosis, inflamed.   - Cryotherapy performed today. See procedure section.    # Sebaceous hyperplasia, L cheek and forehead.  - Reviewed cosmetic destruction option with patient. Reviewed possibility of scarring, discoloration, or recurrence.  - Reviewed tretinoin as treatment option. Patient declines at this time.   - Future considerations: tretinoin, lesion destruction.     # Multiple clinically benign nevi on the trunk and extremities.    - No treatment necessary unless lesions change or become symptomatic.   ABCD's of melanoma were reviewed with patient and handout provided.      Procedures Performed:   - Cryotherapy procedure note, location(s): L lateral flank. After verbal consent and discussion of risks and benefits including, but not limited to, dyspigmentation/scar, blister, and pain, 1 lesion(s) was(were) treated with 1-2 mm freeze border for 1-2 cycles with liquid nitrogen. Post cryotherapy instructions were provided.      Follow-up: 1  year(s) in-person, or earlier for new or changing lesions    Staff and Scribe:     Scribe Disclosure:   I, Maria Alejandra Schwartz, am serving as a scribe to document services personally performed by Sepideh Andre MD based on data collection and the provider's statements to me.     Provider Disclosure:   The documentation recorded by the scribe accurately reflects the services I personally performed and the decisions made by me.    Sepideh Andre MD    Department of Dermatology  [facility]: Phone: [phone], Fax:[phone]  [facility]: Phone: [phone], Fax: [phone]   ____________________________________________    CC: Skin Check (R clavicle area, some bumps on face )    HPI:  Ms. Crystal Marcus is a(n) 53 year old female who presents today as a return patient for skin check.     Today, patient reports a changing mole on her R clavicle. She also has some bumps on her face. Reports her eczema is being controlled on the clobetasol      She  wants mooles checked on right shoulder. She also reports spot she is bothered by the left flank    Patient is otherwise feeling well, without additional skin concerns.    Labs Reviewed:  N/A    Physical Exam:  Vitals: LMP 12/07/2015   SKIN: Total skin including external buttock areas was performed. The exam included the head/face, neck, both arms, chest, back, abdomen, both legs, digits and/or nails. Declines genital exam.   - Fleshy well demarcated pigmented papule R anterior clavicle.   - There is a tan to brown waxy stuck on papule with surrounding erythema on the L lateral flank. .   - There are yellow oily papules with central umbilication located on the forehead, L cheek..   - Multiple regular brown pigmented macules and papules are identified on the trunk and extremities. .     - No other lesions of concern on areas examined.     Medications:  Current  Outpatient Medications   Medication Sig Dispense Refill     clobetasol (TEMOVATE) 0.05 % external cream Apply twice daily as needed for rash on trunk or extremities for up to two weeks at a time. 60 g 11     desvenlafaxine (PRISTIQ) 50 MG 24 hr tablet Take 1 tablet (50 mg) by mouth daily Take together with 25 mg for a total of 75 mg 90 tablet 1     desvenlafaxine succinate (PRISTIQ) 25 MG 24 hr tablet Take 1 tablet (25 mg) by mouth daily Take together with 50 mg for a total of 75 mg 90 tablet 1     multivitamin, therapeutic with minerals (THERA-VIT-M) TABS Take 1 tablet by mouth daily       pantoprazole (PROTONIX) 20 MG EC tablet Take 1 tablet (20 mg) by mouth daily 30 tablet 4     tacrolimus (PROTOPIC) 0.1 % external ointment Apply twice daily as needed for rash on trunk or extremities 60 g 11     Current Facility-Administered Medications   Medication Dose Route Frequency Provider Last Rate Last Admin     hydrogen peroxide 3 % solution 1 mL  1 mL Topical See Admin Instructions Marcial Guevara PA   1 mL at 10/04/23 0848      Past Medical History:   Patient Active Problem List   Diagnosis     FH: breast cancer in first degree relative     Hot flashes     Vitamin D deficiency     Ureterocele, acquired     Other hyperlipidemia     Anxiety     Numbness of toes     Breast pain     Past Medical History:   Diagnosis Date     Arthritis 01/01/2006    X-rays show arthritis in my hips     Complex ovarian cyst 01/15/2015     Depressive disorder      Depressive disorder, not elsewhere classified      FH: breast cancer in first degree relative 08/30/2011     FH: breast cancer in first degree relative      Hydronephrosis 01/23/2015        CC Referred Self, MD  No address on file on close of this encounter.      Again, thank you for allowing me to participate in the care of your patient.        Sincerely,        Sepideh Andre MD

## 2024-04-18 NOTE — NURSING NOTE
Crystal Marcus's chief complaint for this visit includes:  Chief Complaint   Patient presents with    Skin Check     R clavicle area, some bumps on face      PCP: Ivette Lewis    Referring Provider:  Referred Self, MD  No address on file    [facility] 12/07/2015   Data Unavailable        Allergies   Allergen Reactions    Sulfa Antibiotics Rash     SULFA         Do you need any medication refills at today's visit? Renewal of clobetasol and protopic

## 2024-04-19 RX ORDER — PANTOPRAZOLE SODIUM 20 MG/1
20 TABLET, DELAYED RELEASE ORAL DAILY
Qty: 90 TABLET | Refills: 2 | Status: SHIPPED | OUTPATIENT
Start: 2024-04-19

## 2024-09-22 DIAGNOSIS — N95.1 VASOMOTOR SYMPTOMS DUE TO MENOPAUSE: ICD-10-CM

## 2024-09-23 RX ORDER — DESVENLAFAXINE 25 MG/1
25 TABLET, EXTENDED RELEASE ORAL DAILY
Qty: 90 TABLET | Refills: 0 | Status: SHIPPED | OUTPATIENT
Start: 2024-09-23

## 2024-09-29 DIAGNOSIS — N95.1 VASOMOTOR SYMPTOMS DUE TO MENOPAUSE: ICD-10-CM

## 2024-09-30 RX ORDER — DESVENLAFAXINE 50 MG/1
50 TABLET, FILM COATED, EXTENDED RELEASE ORAL DAILY
Qty: 90 TABLET | Refills: 1 | Status: SHIPPED | OUTPATIENT
Start: 2024-09-30

## 2024-11-06 SDOH — HEALTH STABILITY: PHYSICAL HEALTH: ON AVERAGE, HOW MANY DAYS PER WEEK DO YOU ENGAGE IN MODERATE TO STRENUOUS EXERCISE (LIKE A BRISK WALK)?: 3 DAYS

## 2024-11-06 SDOH — HEALTH STABILITY: PHYSICAL HEALTH: ON AVERAGE, HOW MANY MINUTES DO YOU ENGAGE IN EXERCISE AT THIS LEVEL?: 50 MIN

## 2024-11-06 ASSESSMENT — SOCIAL DETERMINANTS OF HEALTH (SDOH): HOW OFTEN DO YOU GET TOGETHER WITH FRIENDS OR RELATIVES?: ONCE A WEEK

## 2024-11-11 ENCOUNTER — OFFICE VISIT (OUTPATIENT)
Dept: FAMILY MEDICINE | Facility: CLINIC | Age: 54
End: 2024-11-11
Payer: COMMERCIAL

## 2024-11-11 VITALS
WEIGHT: 176 LBS | RESPIRATION RATE: 16 BRPM | BODY MASS INDEX: 27.62 KG/M2 | TEMPERATURE: 98.7 F | DIASTOLIC BLOOD PRESSURE: 68 MMHG | OXYGEN SATURATION: 98 % | SYSTOLIC BLOOD PRESSURE: 104 MMHG | HEIGHT: 67 IN | HEART RATE: 80 BPM

## 2024-11-11 DIAGNOSIS — N95.1 POST MENOPAUSAL SYNDROME: ICD-10-CM

## 2024-11-11 DIAGNOSIS — Z23 ENCOUNTER FOR ADMINISTRATION OF VACCINE: ICD-10-CM

## 2024-11-11 DIAGNOSIS — Z13.29 SCREENING FOR THYROID DISORDER: ICD-10-CM

## 2024-11-11 DIAGNOSIS — Z90.710 STATUS POST HYSTERECTOMY: ICD-10-CM

## 2024-11-11 DIAGNOSIS — E78.49 OTHER HYPERLIPIDEMIA: ICD-10-CM

## 2024-11-11 DIAGNOSIS — Z00.00 ROUTINE GENERAL MEDICAL EXAMINATION AT A HEALTH CARE FACILITY: Primary | ICD-10-CM

## 2024-11-11 DIAGNOSIS — Z12.31 VISIT FOR SCREENING MAMMOGRAM: ICD-10-CM

## 2024-11-11 PROCEDURE — 99396 PREV VISIT EST AGE 40-64: CPT | Mod: 25 | Performed by: STUDENT IN AN ORGANIZED HEALTH CARE EDUCATION/TRAINING PROGRAM

## 2024-11-11 PROCEDURE — 90673 RIV3 VACCINE NO PRESERV IM: CPT | Performed by: STUDENT IN AN ORGANIZED HEALTH CARE EDUCATION/TRAINING PROGRAM

## 2024-11-11 PROCEDURE — 99214 OFFICE O/P EST MOD 30 MIN: CPT | Mod: 25 | Performed by: STUDENT IN AN ORGANIZED HEALTH CARE EDUCATION/TRAINING PROGRAM

## 2024-11-11 PROCEDURE — 90471 IMMUNIZATION ADMIN: CPT | Performed by: STUDENT IN AN ORGANIZED HEALTH CARE EDUCATION/TRAINING PROGRAM

## 2024-11-11 RX ORDER — ESTRADIOL 0.03 MG/D
1 FILM, EXTENDED RELEASE TRANSDERMAL
Qty: 8 PATCH | Refills: 4 | Status: SHIPPED | OUTPATIENT
Start: 2024-11-11

## 2024-11-11 ASSESSMENT — PATIENT HEALTH QUESTIONNAIRE - PHQ9
SUM OF ALL RESPONSES TO PHQ QUESTIONS 1-9: 3
SUM OF ALL RESPONSES TO PHQ QUESTIONS 1-9: 3
10. IF YOU CHECKED OFF ANY PROBLEMS, HOW DIFFICULT HAVE THESE PROBLEMS MADE IT FOR YOU TO DO YOUR WORK, TAKE CARE OF THINGS AT HOME, OR GET ALONG WITH OTHER PEOPLE: SOMEWHAT DIFFICULT

## 2024-11-11 NOTE — PROGRESS NOTES
"Preventive Care Visit  [facility] ROSETTE Lewis MD, Family Medicine  Nov 11, 2024      Assessment & Plan     Routine general medical examination at a health care facility    - REVIEW OF HEALTH MAINTENANCE PROTOCOL ORDERS    Other hyperlipidemia    - Lipid panel reflex to direct LDL Fasting; Future    Post menopausal syndrome  Uncontrolled.  Reduce Pristiq from 75 mg to 50 mg.  Start estrogen  - estradiol (VIVELLE-DOT) 0.025 MG/24HR bi-weekly patch; Place 1 patch over 96 hours onto the skin twice a week.  The risks, benefits and treatment options of prescribed medications or other treatments have been discussed with the patient. The patient verbalized their understanding and should call or follow up if no improvement or if they develop further problems  Follow up in 6 weeks  Status post hysterectomy  As above  - estradiol (VIVELLE-DOT) 0.025 MG/24HR bi-weekly patch; Place 1 patch over 96 hours onto the skin twice a week.    Visit for screening mammogram    - MA Screening Bilateral w/ Rey; Future    Screening for thyroid disorder    - TSH with free T4 reflex; Future    Encounter for administration of vaccine    - INFLUENZA VACCINE TRIVALENT(FLUBLOK)    Patient has been advised of split billing requirements and indicates understanding: Yes        BMI  Estimated body mass index is 27.57 kg/m  as calculated from the following:    Height as of this encounter: 1.702 m (5' 7\").    Weight as of this encounter: 79.8 kg (176 lb).   Weight management plan: Discussed healthy diet and exercise guidelines    Counseling  Appropriate preventive services were addressed with this patient via screening, questionnaire, or discussion as appropriate for fall prevention, nutrition, physical activity, Tobacco-use cessation, social engagement, weight loss and cognition.  Checklist reviewing preventive services available has been given to the patient.  Reviewed patient's diet, addressing concerns and/or " questions.   She is at risk for lack of exercise and has been provided with information to increase physical activity for the benefit of her well-being.           Brittney Rojas is a 53 year old, presenting for the following:  Physical        11/11/2024     9:31 AM   Additional Questions   Roomed by Seema ERAZO         11/11/2024     9:31 AM   Patient Reported Additional Medications   Patient reports taking the following new medications None          HPI  She is postmenopausal and experiences related symptoms. She was previously on Pristiq 75 mg, which was effective for a while but eventually stopped working. The patient is interested in starting hormone replacement therapy. She denies any history of blood clots, stroke, thrombophilia, migraines with aura, or cancer. She has had a hysterectomy but still retains her ovaries.  Health Care Directive  Patient has a Health Care Directive on file  Advance care planning document is on file and is current.      11/6/2024   General Health   How would you rate your overall physical health? Excellent   Feel stress (tense, anxious, or unable to sleep) Not at all            11/6/2024   Nutrition   Three or more servings of calcium each day? Yes   Diet: Regular (no restrictions)   How many servings of fruit and vegetables per day? 4 or more   How many sweetened beverages each day? 0-1            11/6/2024   Exercise   Days per week of moderate/strenous exercise 3 days   Average minutes spent exercising at this level 50 min            11/6/2024   Social Factors   Frequency of gathering with friends or relatives Once a week   Worry food won't last until get money to buy more No   Food not last or not have enough money for food? No   Do you have housing? (Housing is defined as stable permanent housing and does not include staying ouside in a car, in a tent, in an abandoned building, in an overnight shelter, or couch-surfing.) Yes   Are you worried about losing your housing? No    Lack of transportation? No   Unable to get utilities (heat,electricity)? No            2024   Fall Risk   Fallen 2 or more times in the past year? No    Trouble with walking or balance? No        Patient-reported          2024   Dental   Dentist two times every year? Yes            2024   TB Screening   Were you born outside of the [place]? No          Today's PHQ-9 Score:       2024     9:07 AM   PHQ-9 SCORE   PHQ-9 Total Score MyChart 3 (Minimal depression)   PHQ-9 Total Score 3        Patient-reported         2024   Substance Use   Alcohol more than 3/day or more than 7/wk No   Do you use any other substances recreationally? (!) CANNABIS PRODUCTS        Social History     Tobacco Use    Smoking status: Former     Current packs/day: 0.00     Average packs/day: 0.2 packs/day for 12.7 years (2.5 ttl pk-yrs)     Types: Cigarettes     Start date: 1988     Quit date: 2000     Years since quittin.2     Passive exposure: Past    Smokeless tobacco: Never   Vaping Use    Vaping status: Never Used   Substance Use Topics    Alcohol use: Yes     Comment: occ    Drug use: No           10/26/2023   LAST FHS-7 RESULTS   1st degree relative breast or ovarian cancer Yes   Any relative bilateral breast cancer Unknown   Any male have breast cancer No   Any ONE woman have BOTH breast AND ovarian cancer No   Any woman with breast cancer before 50yrs Yes   2 or more relatives with breast AND/OR ovarian cancer Yes   2 or more relatives with breast AND/OR bowel cancer No          Mammogram Screening - Mammogram every 1-2 years updated in Health Maintenance based on mutual decision making        2024   STI Screening   New sexual partner(s) since last STI/HIV test? No        History of abnormal Pap smear:         2011    11:48 AM   PAP / HPV   PAP (Historical) NIL      ASCVD Risk   The 10-year ASCVD risk score (Sonia SANDOVAL, et al., 2019) is: 1.6%    Values used to calculate the score:     "  Age: 53 years      Sex: Female      Is Non- : No      Diabetic: No      Tobacco smoker: No      Systolic Blood Pressure: 104 mmHg      Is BP treated: No      HDL Cholesterol: 48 mg/dL      Total Cholesterol: 265 mg/dL        Reviewed and updated as needed this visit by Provider                    Past Medical History:   Diagnosis Date    Arthritis 2006    X-rays show arthritis in my hips    Complex ovarian cyst 01/15/2015    Depressive disorder     Depressive disorder, not elsewhere classified     FH: breast cancer in first degree relative 2011    FH: breast cancer in first degree relative     Hydronephrosis 2015     Past Surgical History:   Procedure Laterality Date    BIOPSY  2015    DAVINCI HYSTERECTOMY TOTAL, SALPINGECTOMY BILATERAL Bilateral 2015    Procedure: DAVINCI HYSTERECTOMY TOTAL, SALPINGECTOMY BILATERAL;  Surgeon: Dary Bloom MD;  Location: [facility] OR    HYSTERECTOMY, PAP NO LONGER INDICATED      SURGICAL HISTORY OF -       Cryotherapy    wisdom teeth removal       OB History    Para Term  AB Living   2 2 2 0 0 0   SAB IAB Ectopic Multiple Live Births   0 0 0 0 0      # Outcome Date GA Lbr Vito/2nd Weight Sex Type Anes PTL Lv   2 Term 05           1 Term 01             Labs reviewed in EPIC      Review of Systems  Constitutional, HEENT, cardiovascular, pulmonary, GI, , musculoskeletal, neuro, skin, endocrine and psych systems are negative, except as otherwise noted.     Objective    Exam  /68   Pulse 80   Temp 98.7  F (37.1  C) (Oral)   Resp 16   Ht 1.702 m (5' 7\")   Wt 79.8 kg (176 lb)   LMP 2015   SpO2 98%   BMI 27.57 kg/m     Estimated body mass index is 27.57 kg/m  as calculated from the following:    Height as of this encounter: 1.702 m (5' 7\").    Weight as of this encounter: 79.8 kg (176 lb).    Physical Exam  GENERAL: alert and no distress  EYES: Eyes grossly normal to inspection, " PERRL and conjunctivae and sclerae normal  HENT: ear canals and TM's normal, nose and mouth without ulcers or lesions  NECK: noscars  RESP: lungs clear to auscultation - no rales, rhonchi or wheezes  CV: regular rate and rhythm, normal S1 S2, ABDOMEN: soft, nontender, no hepatosplenomegaly, no masses and bowel sounds normal  MS: no gross musculoskeletal defects noted, no edema  SKIN: no suspicious lesions or rashes  NEURO: Normal strength and tone, mentation intact and speech normal  PSYCH: mentation appears normal, affect normal/bright        Signed Electronically by: Ivette Lewis MD

## 2024-11-11 NOTE — PATIENT INSTRUCTIONS
Patient Education   Preventive Care Advice   This is general advice given by our system to help you stay healthy. However, your care team may have specific advice just for you. Please talk to your care team about your preventive care needs.  Nutrition  Eat 5 or more servings of fruits and vegetables each day.  Try wheat bread, brown rice and whole grain pasta (instead of white bread, rice, and pasta).  Get enough calcium and vitamin D. Check the label on foods and aim for 100% of the RDA (recommended daily allowance).  Lifestyle  Exercise at least 150 minutes each week  (30 minutes a day, 5 days a week).  Do muscle strengthening activities 2 days a week. These help control your weight and prevent disease.  No smoking.  Wear sunscreen to prevent skin cancer.  Have a dental exam and cleaning every 6 months.  Yearly exams  See your health care team every year to talk about:  Any changes in your health.  Any medicines your care team has prescribed.  Preventive care, family planning, and ways to prevent chronic diseases.  Shots (vaccines)   HPV shots (up to age 26), if you've never had them before.  Hepatitis B shots (up to age 59), if you've never had them before.  COVID-19 shot: Get this shot when it's due.  Flu shot: Get a flu shot every year.  Tetanus shot: Get a tetanus shot every 10 years.  Pneumococcal, hepatitis A, and RSV shots: Ask your care team if you need these based on your risk.  Shingles shot (for age 50 and up)  General health tests  Diabetes screening:  Starting at age 35, Get screened for diabetes at least every 3 years.  If you are younger than age 35, ask your care team if you should be screened for diabetes.  Cholesterol test: At age 39, start having a cholesterol test every 5 years, or more often if advised.  Bone density scan (DEXA): At age 50, ask your care team if you should have this scan for osteoporosis (brittle bones).  Hepatitis C: Get tested at least once in your life.  STIs (sexually  transmitted infections)  Before age 24: Ask your care team if you should be screened for STIs.  After age 24: Get screened for STIs if you're at risk. You are at risk for STIs (including HIV) if:  You are sexually active with more than one person.  You don't use condoms every time.  You or a partner was diagnosed with a sexually transmitted infection.  If you are at risk for HIV, ask about PrEP medicine to prevent HIV.  Get tested for HIV at least once in your life, whether you are at risk for HIV or not.  Cancer screening tests  Cervical cancer screening: If you have a cervix, begin getting regular cervical cancer screening tests starting at age 21.  Breast cancer scan (mammogram): If you've ever had breasts, begin having regular mammograms starting at age 40. This is a scan to check for breast cancer.  Colon cancer screening: It is important to start screening for colon cancer at age 45.  Have a colonoscopy test every 10 years (or more often if you're at risk) Or, ask your provider about stool tests like a FIT test every year or Cologuard test every 3 years.  To learn more about your testing options, visit:   .  For help making a decision, visit:   https://bit.ly/vb18391.  Prostate cancer screening test: If you have a prostate, ask your care team if a prostate cancer screening test (PSA) at age 55 is right for you.  Lung cancer screening: If you are a current or former smoker ages 50 to 80, ask your care team if ongoing lung cancer screenings are right for you.  For informational purposes only. Not to replace the advice of your health care provider. Copyright   2023 [facility]. All rights reserved. Clinically reviewed by the [facility]. PPLCONNECT 630117 - REV 01/24.  Substance Use Disorder: Care Instructions  Overview     You can improve your life and health by stopping your use of alcohol or drugs. When you don't drink or use drugs, you may feel and sleep better. You may  get along better with your family, friends, and coworkers. There are medicines and programs that can help with substance use disorder.  How can you care for yourself at home?  Here are some ways to help you stay sober and prevent relapse.  If you have been given medicine to help keep you sober or reduce your cravings, be sure to take it exactly as prescribed.  Talk to your doctor about programs that can help you stop using drugs or drinking alcohol.  Do not keep alcohol or drugs in your home.  Plan ahead. Think about what you'll say if other people ask you to drink or use drugs. Try not to spend time with people who drink or use drugs.  Use the time and money spent on drinking or drugs to do something that's important to you.  Preventing a relapse  Have a plan to deal with relapse. Learn to recognize changes in your thinking that lead you to drink or use drugs. Get help before you start to drink or use drugs again.  Try to stay away from situations, friends, or places that may lead you to drink or use drugs.  If you feel the need to drink alcohol or use drugs again, seek help right away. Call a trusted friend or family member. Some people get support from organizations such as Narcotics Anonymous or [employer] or from treatment facilities.  If you relapse, get help as soon as you can. Some people make a plan with another person that outlines what they want that person to do for them if they relapse. The plan usually includes how to handle the relapse and who to notify in case of relapse.  Don't give up. Remember that a relapse doesn't mean that you have failed. Use the experience to learn the triggers that lead you to drink or use drugs. Then quit again. Recovery is a lifelong process. Many people have several relapses before they are able to quit for good.  Follow-up care is a key part of your treatment and safety. Be sure to make and go to all appointments, and call your doctor if you are having problems. It's  "also a good idea to know your test results and keep a list of the medicines you take.  When should you call for help?   Call 911  anytime you think you may need emergency care. For example, call if you or someone else:    Has overdosed or has withdrawal signs. Be sure to tell the emergency workers that you are or someone else is using or trying to quit using drugs. Overdose or withdrawal signs may include:  Losing consciousness.  Seizure.  Seeing or hearing things that aren't there (hallucinations).     Is thinking or talking about suicide or harming others.   Where to get help 24 hours a day, 7 days a week   If you or someone you know talks about suicide, self-harm, a mental health crisis, a substance use crisis, or any other kind of emotional distress, get help right away. You can:    Call the Suicide and Crisis Lifeline at 988.     Call 5-551-705-TALK (1-283.838.4029).     Text HOME to 072230 to access the Crisis Text Line.   Consider saving these numbers in your phone.  Go to [employer] for more information or to chat online.  Call your doctor now or seek immediate medical care if:    You are having withdrawal symptoms. These may include nausea or vomiting, sweating, shakiness, and anxiety.   Watch closely for changes in your health, and be sure to contact your doctor if:    You have a relapse.     You need more help or support to stop.   Where can you learn more?  Go to https://www.[employer].net/patiented  Enter H573 in the search box to learn more about \"Substance Use Disorder: Care Instructions.\"  Current as of: November 15, 2023  Content Version: 14.2 2024 [facility] [email or web address].   Care instructions adapted under license by your healthcare professional. If you have questions about a medical condition or this instruction, always ask your healthcare professional. [employer] disclaims any warranty or liability for your use of this information.       "

## 2024-11-25 DIAGNOSIS — N95.1 POST MENOPAUSAL SYNDROME: ICD-10-CM

## 2024-11-25 DIAGNOSIS — Z90.710 STATUS POST HYSTERECTOMY: ICD-10-CM

## 2024-11-25 RX ORDER — ESTRADIOL 0.03 MG/D
1 FILM, EXTENDED RELEASE TRANSDERMAL
Qty: 6 PATCH | Refills: 0 | Status: SHIPPED | OUTPATIENT
Start: 2024-11-25 | End: 2024-11-27

## 2024-11-26 ENCOUNTER — ANCILLARY PROCEDURE (OUTPATIENT)
Dept: MAMMOGRAPHY | Facility: CLINIC | Age: 54
End: 2024-11-26
Attending: STUDENT IN AN ORGANIZED HEALTH CARE EDUCATION/TRAINING PROGRAM
Payer: COMMERCIAL

## 2024-11-26 DIAGNOSIS — Z12.31 VISIT FOR SCREENING MAMMOGRAM: ICD-10-CM

## 2024-11-26 PROCEDURE — 77067 SCR MAMMO BI INCL CAD: CPT | Mod: TC | Performed by: RADIOLOGY

## 2024-11-26 PROCEDURE — 77063 BREAST TOMOSYNTHESIS BI: CPT | Mod: TC | Performed by: RADIOLOGY

## 2024-11-27 ENCOUNTER — LAB (OUTPATIENT)
Dept: LAB | Facility: CLINIC | Age: 54
End: 2024-11-27
Payer: COMMERCIAL

## 2024-11-27 DIAGNOSIS — Z13.29 SCREENING FOR THYROID DISORDER: ICD-10-CM

## 2024-11-27 DIAGNOSIS — E78.49 OTHER HYPERLIPIDEMIA: ICD-10-CM

## 2024-11-27 LAB
CHOLEST SERPL-MCNC: 240 MG/DL
FASTING STATUS PATIENT QL REPORTED: YES
HDLC SERPL-MCNC: 47 MG/DL
LDLC SERPL CALC-MCNC: 170 MG/DL
NONHDLC SERPL-MCNC: 193 MG/DL
TRIGL SERPL-MCNC: 116 MG/DL
TSH SERPL DL<=0.005 MIU/L-ACNC: 1.86 UIU/ML (ref 0.3–4.2)

## 2024-11-27 PROCEDURE — 36415 COLL VENOUS BLD VENIPUNCTURE: CPT

## 2024-11-27 PROCEDURE — 80061 LIPID PANEL: CPT

## 2024-11-27 PROCEDURE — 84443 ASSAY THYROID STIM HORMONE: CPT

## 2024-11-27 RX ORDER — ESTRADIOL 0.03 MG/D
1 FILM, EXTENDED RELEASE TRANSDERMAL
Qty: 8 PATCH | Refills: 0 | Status: SHIPPED | OUTPATIENT
Start: 2024-11-28

## 2024-12-18 ENCOUNTER — VIRTUAL VISIT (OUTPATIENT)
Dept: FAMILY MEDICINE | Facility: CLINIC | Age: 54
End: 2024-12-18
Attending: STUDENT IN AN ORGANIZED HEALTH CARE EDUCATION/TRAINING PROGRAM
Payer: COMMERCIAL

## 2024-12-18 DIAGNOSIS — Z90.710 STATUS POST HYSTERECTOMY: ICD-10-CM

## 2024-12-18 DIAGNOSIS — N95.1 POST MENOPAUSAL SYNDROME: ICD-10-CM

## 2024-12-18 PROBLEM — N64.4 BREAST PAIN: Status: RESOLVED | Noted: 2022-08-11 | Resolved: 2024-12-18

## 2024-12-18 PROCEDURE — 99213 OFFICE O/P EST LOW 20 MIN: CPT | Mod: 95 | Performed by: STUDENT IN AN ORGANIZED HEALTH CARE EDUCATION/TRAINING PROGRAM

## 2024-12-18 RX ORDER — ESTRADIOL 0.03 MG/D
1 FILM, EXTENDED RELEASE TRANSDERMAL
Qty: 8 PATCH | Refills: 3 | Status: SHIPPED | OUTPATIENT
Start: 2025-01-03

## 2024-12-18 RX ORDER — ESTRADIOL 0.03 MG/D
1 FILM, EXTENDED RELEASE TRANSDERMAL
Qty: 8 PATCH | Refills: 0 | OUTPATIENT
Start: 2024-12-19

## 2024-12-18 NOTE — PATIENT INSTRUCTIONS
Phil Rojas,    Thank you for allowing [facility] to manage your care.    I sent your prescriptions to your pharmacy.      For your convenience, test results are released as soon as they are available  Please allow 1-2 business days for me to send you a comment about your results.  If not done so, I encourage you to login into [email or web address] to review your results in real time.     If you have any questions or concerns, please feel free to call us at [phone].    Sincerely,    Dr. Lewis    Did you know?      You can schedule a video visit for follow-up appointments as well as future appointments for certain conditions.  Please see the below link.     [email or web address]    If you have not already done so,  I encourage you to sign up for [email or web address] ([email or web address]).  This will allow you to review your results, securely communicate with a provider, and schedule virtual visits as well.

## 2024-12-18 NOTE — PROGRESS NOTES
Crystal is a 53 year old who is being evaluated via a billable video visit.    How would you like to obtain your AVS? [employer]  If the video visit is dropped, the invitation should be resent by: Text to cell phone: [phone]  Will anyone else be joining your video visit? No      Assessment & Plan     Post menopausal syndrome  Improving. Continue medication. We will re-assess in 2 weeks.  - estradiol (VIVELLE-DOT) 0.025 MG/24HR bi-weekly patch; Place 1 patch onto the skin twice a week.    Status post hysterectomy  As above  - estradiol (VIVELLE-DOT) 0.025 MG/24HR bi-weekly patch; Place 1 patch onto the skin twice a week.  Follow up in 2 weeks.              Subjective   Crystal is a 53 year old, presenting for the following health issues:  Recheck Medication        12/18/2024     8:14 AM   Additional Questions   Roomed by Patient completed echeck in via [email or web address]     History of Present Illness       Reason for visit:  Follow up menopausal symptoms    She eats 4 or more servings of fruits and vegetables daily.She consumes 0 sweetened beverage(s) daily.She exercises with enough effort to increase her heart rate 20 to 29 minutes per day.  She exercises with enough effort to increase her heart rate 3 or less days per week.   She is taking medications regularly.       Hot flashes is improving but she is still struggling with insomnia. She has only been using the estrogen patch for 2 weeks.      Review of Systems  Constitutional, HEENT, cardiovascular, pulmonary, gi and gu systems are negative, except as otherwise noted.      Objective           Vitals:  No vitals were obtained today due to virtual visit.    Physical Exam   GENERAL: alert and no distress  EYES: Eyes grossly normal to inspection.  No discharge or erythema, or obvious scleral/conjunctival abnormalities.  PSYCH: Appropriate affect, tone, and pace of words          Video-Visit Details    Type of service:  Video Visit   Originating Location (pt. Location):  Home      Distant Location (provider location):  On-site  Platform used for Video Visit: Timi  Signed Electronically by: Ivette Lewis MD

## 2025-01-19 DIAGNOSIS — R12 HEARTBURN: ICD-10-CM

## 2025-01-19 RX ORDER — PANTOPRAZOLE SODIUM 20 MG/1
20 TABLET, DELAYED RELEASE ORAL DAILY
Qty: 90 TABLET | Refills: 2 | Status: SHIPPED | OUTPATIENT
Start: 2025-01-19

## 2025-02-13 DIAGNOSIS — N95.1 POST MENOPAUSAL SYNDROME: ICD-10-CM

## 2025-02-13 DIAGNOSIS — Z90.710 STATUS POST HYSTERECTOMY: ICD-10-CM

## 2025-02-13 RX ORDER — ESTRADIOL 0.03 MG/D
PATCH TRANSDERMAL WEEKLY
Qty: 4 PATCH | Refills: 5 | Status: SHIPPED | OUTPATIENT
Start: 2025-02-13

## 2025-02-13 RX ORDER — ESTRADIOL 0.03 MG/D
FILM, EXTENDED RELEASE TRANSDERMAL
Qty: 24 PATCH | Refills: 2 | OUTPATIENT
Start: 2025-02-13

## 2025-05-06 DIAGNOSIS — N95.1 POST MENOPAUSAL SYNDROME: ICD-10-CM

## 2025-05-06 DIAGNOSIS — Z90.710 STATUS POST HYSTERECTOMY: ICD-10-CM

## 2025-05-06 RX ORDER — ESTRADIOL 0.03 MG/D
PATCH TRANSDERMAL WEEKLY
Qty: 12 PATCH | Refills: 2 | Status: SHIPPED | OUTPATIENT
Start: 2025-05-06

## 2025-05-07 ENCOUNTER — MYC MEDICAL ADVICE (OUTPATIENT)
Dept: FAMILY MEDICINE | Facility: CLINIC | Age: 55
End: 2025-05-07
Payer: COMMERCIAL

## 2025-05-07 DIAGNOSIS — N95.1 POST MENOPAUSAL SYNDROME: ICD-10-CM

## 2025-05-07 DIAGNOSIS — Z90.710 STATUS POST HYSTERECTOMY: ICD-10-CM

## 2025-05-12 RX ORDER — ESTRADIOL 0.03 MG/D
1 FILM, EXTENDED RELEASE TRANSDERMAL
Qty: 8 PATCH | Refills: 5 | Status: SHIPPED | OUTPATIENT
Start: 2025-05-12

## 2025-06-24 NOTE — PATIENT INSTRUCTIONS
Proper skin care from [facility]:    -Eliminate harsh soaps as they strip the natural oils from the skin, often resulting in dry itchy skin ( i.e. Dial, Zest, Vietnamese Spring)  -Use mild soaps such as Cetaphil or Dove Sensitive Skin in the shower. You do not need to use soap on arms, legs, and trunk every time you shower unless visibly soiled.   -Avoid hot or cold showers.  -After showering, lightly dry off and apply moisturizing within 2-3 minutes. This will help trap moisture in the skin.   -Aggressive use of a moisturizer at least 1-2 times a day to the entire body (including -Vanicream, Cetaphil, Aquaphor or Cerave) and moisturize hands after every washing.  -We recommend using moisturizers that come in a tub that needs to be scooped out, not a pump. This has more of an oil base. It will hold moisture in your skin much better than a water base moisturizer. The above recommended are non-pore clogging.      Wear a sunscreen with at least SPF 30 on your face, ears, neck and V of the chest daily. Wear sunscreen on other areas of the body if those areas are exposed to the sun throughout the day. Sunscreens can contain physical and/or chemical blockers. Physical blockers are less likely to clog pores, these include zinc oxide and titanium dioxide. Reapply every two hour and after swimming.     Sunscreen examples: https://www.ewg.org/sunscreen/    UV radiation  UVA radiation remains constant throughout the day and throughout the year. It is a longer wavelength than UVB and therefore penetrates deeper into the skin leading to immediate and delayed tanning, photoaging, and skin cancer. 70-80% of UVA and UVB radiation occurs between the hours of 10am-2pm.  UVB radiation  UVB radiation causes the most harmful effects and is more significant during the summer months. However, snow and ice can reflect UVB radiation leading to skin damage during the winter months as well. UVB radiation is responsible for tanning,  burning, inflammation, delayed erythema (pinkness), pigmentation (brown spots), and skin cancer.     I recommend self monthly full body exams and yearly full body exams with a dermatology provider. If you develop a new or changing lesion please follow up for examination. Most skin cancers are pink and scaly or pink and pearly. However, we do see blue/brown/black skin cancers.  Consider the ABCDEs of melanoma when giving yourself your monthly full body exam ( don't forget the groin, buttocks, feet, toes, etc). A-asymmetry, B-borders, C-color, D-diameter, E-elevation or evolving. If you see any of these changes please follow up in clinic. If you cannot see your back I recommend purchasing a hand held mirror to use with a larger wall mirror.       Checking for Skin Cancer  You can find cancer early by checking your skin each month. There are 3 kinds of skin cancer. They are melanoma, basal cell carcinoma, and squamous cell carcinoma. Doing monthly skin checks is the best way to find new marks or skin changes. Follow the instructions below for checking your skin.   The ABCDEs of checking moles for melanoma   Check your moles or growths for signs of melanoma using ABCDE:   Asymmetry: the sides of the mole or growth don t match  Border: the edges are ragged, notched, or blurred  Color: the color within the mole or growth varies  Diameter: the mole or growth is larger than 6 mm (size of a pencil eraser)  Evolving: the size, shape, or color of the mole or growth is changing (evolving is not shown in the images below)    Checking for other types of skin cancer  Basal cell carcinoma or squamous cell carcinoma have symptoms such as:     A spot or mole that looks different from all other marks on your skin  Changes in how an area feels, such as itching, tenderness, or pain  Changes in the skin's surface, such as oozing, bleeding, or scaliness  A sore that does not heal  New swelling or redness beyond the border of a  mole    Who s at risk?  Anyone can get skin cancer. But you are at greater risk if you have:   Fair skin, light-colored hair, or light-colored eyes  Many moles or abnormal moles on your skin  A history of sunburns from sunlight or tanning beds  A family history of skin cancer  A history of exposure to radiation or chemicals  A weakened immune system  If you have had skin cancer in the past, you are at risk for recurring skin cancer.   How to check your skin  Do your monthly skin checkups in front of a full-length mirror. Check all parts of your body, including your:   Head (ears, face, neck, and scalp)  Torso (front, back, and sides)  Arms (tops, undersides, upper, and lower armpits)  Hands (palms, backs, and fingers, including under the nails)  Buttocks and genitals  Legs (front, back, and sides)  Feet (tops, soles, toes, including under the nails, and between toes)  If you have a lot of moles, take digital photos of them each month. Make sure to take photos both up close and from a distance. These can help you see if any moles change over time.   Most skin changes are not cancer. But if you see any changes in your skin, call your doctor right away. Only he or she can diagnose a problem. If you have skin cancer, seeing your doctor can be the first step toward getting the treatment that could save your life.   [email or web address] last reviewed this educational content on 4/1/2019 2000-2020 [employer]. [address] [place] [address], [place] [address]. All rights reserved. This information is not intended as a substitute for professional medical care. Always follow your healthcare professional's instructions.       When should I call my doctor?  If you are worsening or not improving, please, contact us or seek urgent care as noted below.     Who should I call with questions (adults)?    [facility] [phone]  For urgent needs outside of business hours call the [facility] at  [phone] and ask for the dermatology resident on call to be paged  If this is a medical emergency and you are unable to reach an ER, Call 911      If you need a prescription refill, please contact your pharmacy. Refills are approved or denied by our Physicians during normal business hours, Monday through Friday.  Per office policy, refills will not be granted if you have not been seen within the past year (or sooner depending on the condition).

## 2025-06-26 ENCOUNTER — OFFICE VISIT (OUTPATIENT)
Dept: DERMATOLOGY | Facility: CLINIC | Age: 55
End: 2025-06-26
Attending: DERMATOLOGY
Payer: COMMERCIAL

## 2025-06-26 DIAGNOSIS — L82.1 SEBORRHEIC KERATOSES: ICD-10-CM

## 2025-06-26 DIAGNOSIS — L81.4 LENTIGINES: ICD-10-CM

## 2025-06-26 DIAGNOSIS — Z12.83 ENCOUNTER FOR SCREENING FOR MALIGNANT NEOPLASM OF SKIN: ICD-10-CM

## 2025-06-26 DIAGNOSIS — L20.9 ATOPIC DERMATITIS, UNSPECIFIED TYPE: Primary | ICD-10-CM

## 2025-06-26 DIAGNOSIS — D22.9 MULTIPLE BENIGN NEVI: ICD-10-CM

## 2025-06-26 DIAGNOSIS — D18.01 CHERRY ANGIOMA: ICD-10-CM

## 2025-06-26 RX ORDER — TACROLIMUS 1 MG/G
OINTMENT TOPICAL 2 TIMES DAILY
Qty: 30 G | Refills: 2 | Status: SHIPPED | OUTPATIENT
Start: 2025-06-26

## 2025-06-26 NOTE — LETTER
6/26/2025      Crystal Marcus  [id number]  [facility] [address]      Dear Colleague,    Thank you for referring your patient, Crystal Marcus, to the [facility]. Please see a copy of my visit note below.    [facility]ology Note  Encounter Date: Jun 26, 2025  Office Visit     Reviewed patients past medical history and pertinent chart review prior to patients visit today.     Dermatology Problem List:  1. Likely atopic/nummular eczema  - Current tx: clobetasol 0.05% cream BID x 2 weeks for flares on body, Protopic 0.1% ointment BID  2. ISK  - L lateral flank, s/p cryo 4/18/24    ____________________________________________    Assessment & Plan:     # Eczematous dermatitis, flared on left eyelid  -Start Pimecrolimus (elidel) 1% cream twice daily until resolved. for 2-4 weeks, decreasing as patients rash improves, repeat cycle for flares. If not fully resolved in 1 month, patient will contact me for topical steroid.     # Benign skin findings including: seborrheic keratoses, cherry angioma, lentigines and benign nevi.   - No further intervention required. Patient to report changes.   - Patient reassured of the benign nature of these lesions.    #Signs and Symptoms of non-melanoma skin cancer and ABCDEs of melanoma reviewed with patient. Patient encouraged to perform monthly self skin exams and educated on how to perform them. UV precautions reviewed with patient. Patient was asked about new or changing moles/lesions on body.     #Reviewed Sunscreen: Apply 20 minutes prior to going outdoors and reapply every two hours, when wet or sweating. We recommend using an SPF 30 or higher, and to use one that is water resistant.       Follow-up:  1-2 years for follow up full body skin exam, prn for new or changing lesions or new concerns    Tiki Laurent CNP  Dermatology     ____________________________________________    CC: Skin Check (Clobetasol use PRN with flares 1-2 x a  year)    HPI:  Ms. Crystal Marcus is a(n) 54 year old female who presents today as a return patient for a full body skin cancer screening. Patient has concerns today about rash on her left upper eyelid that has been itchy for about 1 month. She has only used aquaphor to it so far and it hasn't gone away.     Patient is otherwise feeling well, without additional skin concerns.     Physical Exam:  SKIN: Total skin excluding the genitalia areas was performed. The exam included the head/face, neck, both arms, chest, back, abdomen, both legs, digits, mons pubis, buttock and nails.   -left upper eyelid, lichenified xerotic plaque  -several 1-2mm red dome shaped symmetric papules scattered on the trunk  -multiple tan/brown flat round macules and raised papules scattered throughout trunk, extremities and head. No worrisome features for malignancy noted on examination.  -scattered tan, homogenous macules scattered on sun exposed areas of trunk, extremities and face.   -scattered waxy, stuck on tan/brown papules and patches on the trunk     - No other lesions of concern on areas examined.     Medications:  Current Outpatient Medications   Medication Sig Dispense Refill     clobetasol propionate (TEMOVATE) 0.05 % external cream Apply twice daily as needed for rash on trunk or extremities for up to two weeks at a time. 60 g 11     desvenlafaxine (PRISTIQ) 50 MG 24 hr tablet TAKE 1 TABLET (50 MG) BY MOUTH DAILY TAKE TOGETHER WITH 25 MG FOR A TOTAL OF 75 MG 90 tablet 1     estradiol (FEMPATCH) 0.025 MG/24HR weekly patch PLACE ONTO THE SKIN ONCE A WEEK. 12 patch 2     estradiol (VIVELLE-DOT) 0.025 MG/24HR bi-weekly patch Place 1 patch onto the skin twice a week. 8 patch 5     multivitamin, therapeutic with minerals (THERA-VIT-M) TABS Take 1 tablet by mouth daily       pantoprazole (PROTONIX) 20 MG EC tablet TAKE 1 TABLET BY MOUTH EVERY DAY 90 tablet 2     tacrolimus (PROTOPIC) 0.1 % external ointment Apply topically 2 times daily.  As needed for flares of eczema, safe for face 30 g 2     Current Facility-Administered Medications   Medication Dose Route Frequency Provider Last Rate Last Admin     hydrogen peroxide 3 % solution 1 mL  1 mL Topical See Admin Instructions Marcial Guevara PA   1 mL at 10/04/23 0848      Past Medical History:   Patient Active Problem List   Diagnosis     FH: breast cancer in first degree relative     Hot flashes     Vitamin D deficiency     Ureterocele, acquired     Other hyperlipidemia     Anxiety     Numbness of toes     Past Medical History:   Diagnosis Date     Arthritis 01/01/2006    X-rays show arthritis in my hips     Complex ovarian cyst 01/15/2015     Depressive disorder      Depressive disorder, not elsewhere classified      FH: breast cancer in first degree relative 08/30/2011     FH: breast cancer in first degree relative      Hydronephrosis 01/23/2015       CC Referred Self, MD  No address on file on close of this encounter.    Again, thank you for allowing me to participate in the care of your patient.        Sincerely,        Shira Laurent, GUALBERTO CNP    Electronically signed

## 2025-06-26 NOTE — PROGRESS NOTES
[facility] Dermatology Note  Encounter Date: Jun 26, 2025  Office Visit     Reviewed patients past medical history and pertinent chart review prior to patients visit today.     Dermatology Problem List:  1. Likely atopic/nummular eczema  - Current tx: clobetasol 0.05% cream BID x 2 weeks for flares on body, Protopic 0.1% ointment BID  2. ISK  - L lateral flank, s/p cryo 4/18/24    ____________________________________________    Assessment & Plan:     # Eczematous dermatitis, flared on left eyelid  -Start Pimecrolimus (elidel) 1% cream twice daily until resolved. for 2-4 weeks, decreasing as patients rash improves, repeat cycle for flares. If not fully resolved in 1 month, patient will contact me for topical steroid.     # Benign skin findings including: seborrheic keratoses, cherry angioma, lentigines and benign nevi.   - No further intervention required. Patient to report changes.   - Patient reassured of the benign nature of these lesions.    #Signs and Symptoms of non-melanoma skin cancer and ABCDEs of melanoma reviewed with patient. Patient encouraged to perform monthly self skin exams and educated on how to perform them. UV precautions reviewed with patient. Patient was asked about new or changing moles/lesions on body.     #Reviewed Sunscreen: Apply 20 minutes prior to going outdoors and reapply every two hours, when wet or sweating. We recommend using an SPF 30 or higher, and to use one that is water resistant.       Follow-up:  1-2 years for follow up full body skin exam, prn for new or changing lesions or new concerns    Tiki Laurent CNP  Dermatology     ____________________________________________    CC: Skin Check (Clobetasol use PRN with flares 1-2 x a year)    HPI:  Ms. Crystal Marcus is a(n) 54 year old female who presents today as a return patient for a full body skin cancer screening. Patient has concerns today about rash on her left upper eyelid that has been itchy for about 1  month. She has only used aquaphor to it so far and it hasn't gone away.     Patient is otherwise feeling well, without additional skin concerns.     Physical Exam:  SKIN: Total skin excluding the genitalia areas was performed. The exam included the head/face, neck, both arms, chest, back, abdomen, both legs, digits, mons pubis, buttock and nails.   -left upper eyelid, lichenified xerotic plaque  -several 1-2mm red dome shaped symmetric papules scattered on the trunk  -multiple tan/brown flat round macules and raised papules scattered throughout trunk, extremities and head. No worrisome features for malignancy noted on examination.  -scattered tan, homogenous macules scattered on sun exposed areas of trunk, extremities and face.   -scattered waxy, stuck on tan/brown papules and patches on the trunk     - No other lesions of concern on areas examined.     Medications:  Current Outpatient Medications   Medication Sig Dispense Refill    clobetasol propionate (TEMOVATE) 0.05 % external cream Apply twice daily as needed for rash on trunk or extremities for up to two weeks at a time. 60 g 11    desvenlafaxine (PRISTIQ) 50 MG 24 hr tablet TAKE 1 TABLET (50 MG) BY MOUTH DAILY TAKE TOGETHER WITH 25 MG FOR A TOTAL OF 75 MG 90 tablet 1    estradiol (FEMPATCH) 0.025 MG/24HR weekly patch PLACE ONTO THE SKIN ONCE A WEEK. 12 patch 2    estradiol (VIVELLE-DOT) 0.025 MG/24HR bi-weekly patch Place 1 patch onto the skin twice a week. 8 patch 5    multivitamin, therapeutic with minerals (THERA-VIT-M) TABS Take 1 tablet by mouth daily      pantoprazole (PROTONIX) 20 MG EC tablet TAKE 1 TABLET BY MOUTH EVERY DAY 90 tablet 2    tacrolimus (PROTOPIC) 0.1 % external ointment Apply topically 2 times daily. As needed for flares of eczema, safe for face 30 g 2     Current Facility-Administered Medications   Medication Dose Route Frequency Provider Last Rate Last Admin    hydrogen peroxide 3 % solution 1 mL  1 mL Topical See Admin Instructions  Marcial Guevara PA   1 mL at 10/04/23 0848      Past Medical History:   Patient Active Problem List   Diagnosis    FH: breast cancer in first degree relative    Hot flashes    Vitamin D deficiency    Ureterocele, acquired    Other hyperlipidemia    Anxiety    Numbness of toes     Past Medical History:   Diagnosis Date    Arthritis 01/01/2006    X-rays show arthritis in my hips    Complex ovarian cyst 01/15/2015    Depressive disorder     Depressive disorder, not elsewhere classified     FH: breast cancer in first degree relative 08/30/2011    FH: breast cancer in first degree relative     Hydronephrosis 01/23/2015       CC Referred Self, MD  No address on file on close of this encounter.

## 2025-08-06 ENCOUNTER — PATIENT OUTREACH (OUTPATIENT)
Dept: CARE COORDINATION | Facility: CLINIC | Age: 55
End: 2025-08-06
Payer: COMMERCIAL

## 2025-08-07 DIAGNOSIS — Z12.11 COLON CANCER SCREENING: ICD-10-CM

## 2025-08-08 DIAGNOSIS — N95.1 VASOMOTOR SYMPTOMS DUE TO MENOPAUSE: ICD-10-CM

## 2025-08-12 RX ORDER — DESVENLAFAXINE 50 MG/1
50 TABLET, FILM COATED, EXTENDED RELEASE ORAL DAILY
Qty: 90 TABLET | Refills: 0 | Status: SHIPPED | OUTPATIENT
Start: 2025-08-12

## 2025-08-21 ENCOUNTER — ORDERS ONLY (AUTO-RELEASED) (OUTPATIENT)
Dept: URGENT CARE | Facility: CLINIC | Age: 55
End: 2025-08-21
Payer: COMMERCIAL

## 2025-08-21 DIAGNOSIS — Z12.11 COLON CANCER SCREENING: ICD-10-CM

## 2025-09-02 ENCOUNTER — VIRTUAL VISIT (OUTPATIENT)
Dept: FAMILY MEDICINE | Facility: CLINIC | Age: 55
End: 2025-09-02
Payer: COMMERCIAL

## 2025-09-02 DIAGNOSIS — E66.811 CLASS 1 OBESITY DUE TO EXCESS CALORIES WITH SERIOUS COMORBIDITY AND BODY MASS INDEX (BMI) OF 30.0 TO 30.9 IN ADULT: Primary | ICD-10-CM

## 2025-09-02 DIAGNOSIS — F41.1 GAD (GENERALIZED ANXIETY DISORDER): ICD-10-CM

## 2025-09-02 DIAGNOSIS — F33.1 MODERATE RECURRENT MAJOR DEPRESSION (H): ICD-10-CM

## 2025-09-02 DIAGNOSIS — E66.09 CLASS 1 OBESITY DUE TO EXCESS CALORIES WITH SERIOUS COMORBIDITY AND BODY MASS INDEX (BMI) OF 30.0 TO 30.9 IN ADULT: Primary | ICD-10-CM

## 2025-09-02 DIAGNOSIS — E78.49 OTHER HYPERLIPIDEMIA: ICD-10-CM

## 2025-09-02 LAB — NONINV COLON CA DNA+OCC BLD SCRN STL QL: NEGATIVE

## 2025-09-02 PROCEDURE — 98006 SYNCH AUDIO-VIDEO EST MOD 30: CPT | Performed by: STUDENT IN AN ORGANIZED HEALTH CARE EDUCATION/TRAINING PROGRAM
